# Patient Record
Sex: FEMALE | Race: WHITE | NOT HISPANIC OR LATINO | Employment: OTHER | URBAN - METROPOLITAN AREA
[De-identification: names, ages, dates, MRNs, and addresses within clinical notes are randomized per-mention and may not be internally consistent; named-entity substitution may affect disease eponyms.]

---

## 2020-11-16 ENCOUNTER — APPOINTMENT (OUTPATIENT)
Dept: CT IMAGING | Facility: CLINIC | Age: 70
DRG: 177 | End: 2020-11-16
Attending: EMERGENCY MEDICINE
Payer: MEDICARE

## 2020-11-16 ENCOUNTER — HOSPITAL ENCOUNTER (INPATIENT)
Facility: CLINIC | Age: 70
LOS: 2 days | Discharge: HOME | DRG: 177 | End: 2020-11-18
Attending: EMERGENCY MEDICINE | Admitting: INTERNAL MEDICINE
Payer: MEDICARE

## 2020-11-16 DIAGNOSIS — J12.82 PNEUMONIA DUE TO 2019 NOVEL CORONAVIRUS: ICD-10-CM

## 2020-11-16 DIAGNOSIS — E87.6 HYPOKALEMIA: ICD-10-CM

## 2020-11-16 DIAGNOSIS — U07.1 PNEUMONIA DUE TO 2019 NOVEL CORONAVIRUS: ICD-10-CM

## 2020-11-16 DIAGNOSIS — J96.01 ACUTE RESPIRATORY FAILURE WITH HYPOXIA (H): ICD-10-CM

## 2020-11-16 PROBLEM — G47.30 SLEEP APNEA: Status: ACTIVE | Noted: 2020-11-16

## 2020-11-16 PROBLEM — Z87.891 HISTORY OF TOBACCO ABUSE: Status: ACTIVE | Noted: 2020-02-10

## 2020-11-16 PROBLEM — E66.9 OBESITY: Status: ACTIVE | Noted: 2020-11-16

## 2020-11-16 PROBLEM — I10 HYPERTENSION: Status: ACTIVE | Noted: 2020-11-16

## 2020-11-16 PROBLEM — G25.81 RESTLESS LEG SYNDROME: Status: ACTIVE | Noted: 2019-08-09

## 2020-11-16 LAB
ALBUMIN SERPL-MCNC: 3.5 G/DL (ref 3.4–5)
ALP SERPL-CCNC: 83 U/L (ref 40–150)
ALT SERPL W P-5'-P-CCNC: 75 U/L (ref 0–50)
ANION GAP SERPL CALCULATED.3IONS-SCNC: 9 MMOL/L (ref 3–14)
AST SERPL W P-5'-P-CCNC: 84 U/L (ref 0–45)
BASOPHILS # BLD AUTO: 0 10E9/L (ref 0–0.2)
BASOPHILS NFR BLD AUTO: 0.3 %
BILIRUB SERPL-MCNC: 0.7 MG/DL (ref 0.2–1.3)
BUN SERPL-MCNC: 7 MG/DL (ref 7–30)
CALCIUM SERPL-MCNC: 8.6 MG/DL (ref 8.5–10.1)
CHLORIDE SERPL-SCNC: 99 MMOL/L (ref 94–109)
CO2 SERPL-SCNC: 26 MMOL/L (ref 20–32)
CREAT SERPL-MCNC: 0.58 MG/DL (ref 0.52–1.04)
CREAT SERPL-MCNC: 0.6 MG/DL (ref 0.52–1.04)
CRP SERPL-MCNC: 26 MG/L (ref 0–8)
D DIMER PPP FEU-MCNC: 0.9 UG/ML FEU (ref 0–0.5)
DIFFERENTIAL METHOD BLD: NORMAL
EOSINOPHIL # BLD AUTO: 0 10E9/L (ref 0–0.7)
EOSINOPHIL NFR BLD AUTO: 0.2 %
ERYTHROCYTE [DISTWIDTH] IN BLOOD BY AUTOMATED COUNT: 13.6 % (ref 10–15)
ERYTHROCYTE [SEDIMENTATION RATE] IN BLOOD BY WESTERGREN METHOD: 49 MM/H (ref 0–30)
FERRITIN SERPL-MCNC: 107 NG/ML (ref 8–252)
FLUAV+FLUBV AG SPEC QL: NEGATIVE
FLUAV+FLUBV AG SPEC QL: NEGATIVE
GFR SERPL CREATININE-BSD FRML MDRD: >90 ML/MIN/{1.73_M2}
GFR SERPL CREATININE-BSD FRML MDRD: >90 ML/MIN/{1.73_M2}
GLUCOSE BLDC GLUCOMTR-MCNC: 111 MG/DL (ref 70–99)
GLUCOSE BLDC GLUCOMTR-MCNC: 165 MG/DL (ref 70–99)
GLUCOSE SERPL-MCNC: 143 MG/DL (ref 70–99)
HBA1C MFR BLD: 7 % (ref 0–5.6)
HCT VFR BLD AUTO: 39.8 % (ref 35–47)
HGB BLD-MCNC: 13.2 G/DL (ref 11.7–15.7)
IMM GRANULOCYTES # BLD: 0 10E9/L (ref 0–0.4)
IMM GRANULOCYTES NFR BLD: 0.3 %
INTERPRETATION ECG - MUSE: NORMAL
LABORATORY COMMENT REPORT: ABNORMAL
LACTATE BLD-SCNC: 1.2 MMOL/L (ref 0.7–2)
LDH SERPL L TO P-CCNC: 352 U/L (ref 81–234)
LIPASE SERPL-CCNC: 99 U/L (ref 73–393)
LYMPHOCYTES # BLD AUTO: 1.2 10E9/L (ref 0.8–5.3)
LYMPHOCYTES NFR BLD AUTO: 20.9 %
MAGNESIUM SERPL-MCNC: 1.7 MG/DL (ref 1.6–2.3)
MCH RBC QN AUTO: 29.9 PG (ref 26.5–33)
MCHC RBC AUTO-ENTMCNC: 33.2 G/DL (ref 31.5–36.5)
MCV RBC AUTO: 90 FL (ref 78–100)
MONOCYTES # BLD AUTO: 0.6 10E9/L (ref 0–1.3)
MONOCYTES NFR BLD AUTO: 9.8 %
NEUTROPHILS # BLD AUTO: 4 10E9/L (ref 1.6–8.3)
NEUTROPHILS NFR BLD AUTO: 68.5 %
NRBC # BLD AUTO: 0 10*3/UL
NRBC BLD AUTO-RTO: 0 /100
PLATELET # BLD AUTO: 213 10E9/L (ref 150–450)
POTASSIUM SERPL-SCNC: 2.9 MMOL/L (ref 3.4–5.3)
POTASSIUM SERPL-SCNC: 3.2 MMOL/L (ref 3.4–5.3)
PROT SERPL-MCNC: 7.9 G/DL (ref 6.8–8.8)
RBC # BLD AUTO: 4.42 10E12/L (ref 3.8–5.2)
SARS-COV-2 RNA SPEC QL NAA+PROBE: NORMAL
SARS-COV-2 RNA SPEC QL NAA+PROBE: POSITIVE
SODIUM SERPL-SCNC: 134 MMOL/L (ref 133–144)
SPECIMEN SOURCE: ABNORMAL
SPECIMEN SOURCE: NORMAL
SPECIMEN SOURCE: NORMAL
TROPONIN I SERPL-MCNC: <0.015 UG/L (ref 0–0.04)
WBC # BLD AUTO: 5.9 10E9/L (ref 4–11)

## 2020-11-16 PROCEDURE — 93005 ELECTROCARDIOGRAM TRACING: CPT

## 2020-11-16 PROCEDURE — 85652 RBC SED RATE AUTOMATED: CPT | Performed by: EMERGENCY MEDICINE

## 2020-11-16 PROCEDURE — 258N000003 HC RX IP 258 OP 636: Performed by: INTERNAL MEDICINE

## 2020-11-16 PROCEDURE — 83690 ASSAY OF LIPASE: CPT | Performed by: EMERGENCY MEDICINE

## 2020-11-16 PROCEDURE — 96365 THER/PROPH/DIAG IV INF INIT: CPT | Mod: 59

## 2020-11-16 PROCEDURE — 86140 C-REACTIVE PROTEIN: CPT | Performed by: EMERGENCY MEDICINE

## 2020-11-16 PROCEDURE — 250N000012 HC RX MED GY IP 250 OP 636 PS 637: Performed by: INTERNAL MEDICINE

## 2020-11-16 PROCEDURE — 250N000011 HC RX IP 250 OP 636: Performed by: EMERGENCY MEDICINE

## 2020-11-16 PROCEDURE — 83605 ASSAY OF LACTIC ACID: CPT | Performed by: EMERGENCY MEDICINE

## 2020-11-16 PROCEDURE — XW033E5 INTRODUCTION OF REMDESIVIR ANTI-INFECTIVE INTO PERIPHERAL VEIN, PERCUTANEOUS APPROACH, NEW TECHNOLOGY GROUP 5: ICD-10-PCS | Performed by: INTERNAL MEDICINE

## 2020-11-16 PROCEDURE — 250N000011 HC RX IP 250 OP 636: Performed by: INTERNAL MEDICINE

## 2020-11-16 PROCEDURE — 83735 ASSAY OF MAGNESIUM: CPT | Performed by: INTERNAL MEDICINE

## 2020-11-16 PROCEDURE — 83036 HEMOGLOBIN GLYCOSYLATED A1C: CPT | Performed by: INTERNAL MEDICINE

## 2020-11-16 PROCEDURE — 258N000003 HC RX IP 258 OP 636: Performed by: EMERGENCY MEDICINE

## 2020-11-16 PROCEDURE — 36415 COLL VENOUS BLD VENIPUNCTURE: CPT | Performed by: INTERNAL MEDICINE

## 2020-11-16 PROCEDURE — 120N000001 HC R&B MED SURG/OB

## 2020-11-16 PROCEDURE — C9803 HOPD COVID-19 SPEC COLLECT: HCPCS

## 2020-11-16 PROCEDURE — 84132 ASSAY OF SERUM POTASSIUM: CPT | Performed by: INTERNAL MEDICINE

## 2020-11-16 PROCEDURE — 80053 COMPREHEN METABOLIC PANEL: CPT | Performed by: EMERGENCY MEDICINE

## 2020-11-16 PROCEDURE — 250N000009 HC RX 250: Performed by: INTERNAL MEDICINE

## 2020-11-16 PROCEDURE — 99285 EMERGENCY DEPT VISIT HI MDM: CPT | Mod: 25

## 2020-11-16 PROCEDURE — 250N000009 HC RX 250: Performed by: EMERGENCY MEDICINE

## 2020-11-16 PROCEDURE — 83520 IMMUNOASSAY QUANT NOS NONAB: CPT | Performed by: INTERNAL MEDICINE

## 2020-11-16 PROCEDURE — 85025 COMPLETE CBC W/AUTO DIFF WBC: CPT | Performed by: EMERGENCY MEDICINE

## 2020-11-16 PROCEDURE — 250N000013 HC RX MED GY IP 250 OP 250 PS 637: Performed by: INTERNAL MEDICINE

## 2020-11-16 PROCEDURE — 99223 1ST HOSP IP/OBS HIGH 75: CPT | Mod: AI | Performed by: INTERNAL MEDICINE

## 2020-11-16 PROCEDURE — 84484 ASSAY OF TROPONIN QUANT: CPT | Performed by: EMERGENCY MEDICINE

## 2020-11-16 PROCEDURE — 83615 LACTATE (LD) (LDH) ENZYME: CPT | Performed by: EMERGENCY MEDICINE

## 2020-11-16 PROCEDURE — 82565 ASSAY OF CREATININE: CPT | Performed by: INTERNAL MEDICINE

## 2020-11-16 PROCEDURE — 999N001017 HC STATISTIC GLUCOSE BY METER IP

## 2020-11-16 PROCEDURE — 85379 FIBRIN DEGRADATION QUANT: CPT | Performed by: EMERGENCY MEDICINE

## 2020-11-16 PROCEDURE — 71275 CT ANGIOGRAPHY CHEST: CPT

## 2020-11-16 PROCEDURE — 82728 ASSAY OF FERRITIN: CPT | Performed by: INTERNAL MEDICINE

## 2020-11-16 PROCEDURE — U0003 INFECTIOUS AGENT DETECTION BY NUCLEIC ACID (DNA OR RNA); SEVERE ACUTE RESPIRATORY SYNDROME CORONAVIRUS 2 (SARS-COV-2) (CORONAVIRUS DISEASE [COVID-19]), AMPLIFIED PROBE TECHNIQUE, MAKING USE OF HIGH THROUGHPUT TECHNOLOGIES AS DESCRIBED BY CMS-2020-01-R: HCPCS | Performed by: EMERGENCY MEDICINE

## 2020-11-16 PROCEDURE — 96361 HYDRATE IV INFUSION ADD-ON: CPT

## 2020-11-16 PROCEDURE — 87804 INFLUENZA ASSAY W/OPTIC: CPT | Performed by: INTERNAL MEDICINE

## 2020-11-16 RX ORDER — PROCHLORPERAZINE MALEATE 5 MG
5 TABLET ORAL EVERY 6 HOURS PRN
Status: DISCONTINUED | OUTPATIENT
Start: 2020-11-16 | End: 2020-11-18 | Stop reason: HOSPADM

## 2020-11-16 RX ORDER — POTASSIUM CHLORIDE 7.45 MG/ML
10 INJECTION INTRAVENOUS ONCE
Status: COMPLETED | OUTPATIENT
Start: 2020-11-16 | End: 2020-11-16

## 2020-11-16 RX ORDER — FAMOTIDINE 20 MG
2000 TABLET ORAL EVERY MORNING
COMMUNITY
End: 2022-10-04

## 2020-11-16 RX ORDER — ALLOPURINOL 100 MG/1
100 TABLET ORAL EVERY EVENING
Status: ON HOLD | COMMUNITY
End: 2024-06-20

## 2020-11-16 RX ORDER — ROPINIROLE 0.25 MG/1
0.25 TABLET, FILM COATED ORAL 3 TIMES DAILY PRN
Status: DISCONTINUED | OUTPATIENT
Start: 2020-11-16 | End: 2020-11-18 | Stop reason: HOSPADM

## 2020-11-16 RX ORDER — LIDOCAINE 40 MG/G
CREAM TOPICAL
Status: DISCONTINUED | OUTPATIENT
Start: 2020-11-16 | End: 2020-11-18 | Stop reason: HOSPADM

## 2020-11-16 RX ORDER — MULTIVITAMIN,THERAPEUTIC
1 TABLET ORAL
Status: DISCONTINUED | OUTPATIENT
Start: 2020-11-17 | End: 2020-11-18 | Stop reason: HOSPADM

## 2020-11-16 RX ORDER — LISINOPRIL 20 MG/1
20 TABLET ORAL DAILY
Status: DISCONTINUED | OUTPATIENT
Start: 2020-11-17 | End: 2020-11-18 | Stop reason: HOSPADM

## 2020-11-16 RX ORDER — AMOXICILLIN 250 MG
2 CAPSULE ORAL 2 TIMES DAILY
Status: DISCONTINUED | OUTPATIENT
Start: 2020-11-16 | End: 2020-11-18 | Stop reason: HOSPADM

## 2020-11-16 RX ORDER — FAMOTIDINE 10 MG
10 TABLET ORAL 2 TIMES DAILY
Status: DISCONTINUED | OUTPATIENT
Start: 2020-11-16 | End: 2020-11-18 | Stop reason: HOSPADM

## 2020-11-16 RX ORDER — POLYETHYLENE GLYCOL 3350 17 G/17G
17 POWDER, FOR SOLUTION ORAL DAILY PRN
Status: DISCONTINUED | OUTPATIENT
Start: 2020-11-16 | End: 2020-11-18 | Stop reason: HOSPADM

## 2020-11-16 RX ORDER — ATORVASTATIN CALCIUM 20 MG/1
20 TABLET, FILM COATED ORAL EVERY EVENING
Status: DISCONTINUED | OUTPATIENT
Start: 2020-11-16 | End: 2020-11-18 | Stop reason: HOSPADM

## 2020-11-16 RX ORDER — ATORVASTATIN CALCIUM 20 MG/1
20 TABLET, FILM COATED ORAL EVERY EVENING
Status: ON HOLD | COMMUNITY
End: 2024-06-20

## 2020-11-16 RX ORDER — POLYETHYLENE GLYCOL 3350 17 G/17G
17 POWDER, FOR SOLUTION ORAL DAILY
Status: DISCONTINUED | OUTPATIENT
Start: 2020-11-16 | End: 2020-11-18 | Stop reason: HOSPADM

## 2020-11-16 RX ORDER — SODIUM CHLORIDE 9 MG/ML
INJECTION, SOLUTION INTRAVENOUS CONTINUOUS
Status: DISCONTINUED | OUTPATIENT
Start: 2020-11-16 | End: 2020-11-17

## 2020-11-16 RX ORDER — PROCHLORPERAZINE 25 MG
12.5 SUPPOSITORY, RECTAL RECTAL EVERY 12 HOURS PRN
Status: DISCONTINUED | OUTPATIENT
Start: 2020-11-16 | End: 2020-11-18 | Stop reason: HOSPADM

## 2020-11-16 RX ORDER — NALOXONE HYDROCHLORIDE 0.4 MG/ML
.1-.4 INJECTION, SOLUTION INTRAMUSCULAR; INTRAVENOUS; SUBCUTANEOUS
Status: DISCONTINUED | OUTPATIENT
Start: 2020-11-16 | End: 2020-11-18 | Stop reason: HOSPADM

## 2020-11-16 RX ORDER — AMOXICILLIN 250 MG
1 CAPSULE ORAL 2 TIMES DAILY
Status: DISCONTINUED | OUTPATIENT
Start: 2020-11-16 | End: 2020-11-18 | Stop reason: HOSPADM

## 2020-11-16 RX ORDER — LISINOPRIL 20 MG/1
20 TABLET ORAL EVERY MORNING
Status: ON HOLD | COMMUNITY
End: 2024-06-20

## 2020-11-16 RX ORDER — MULTIVITAMIN,THERAPEUTIC
1 TABLET ORAL EVERY EVENING
COMMUNITY
End: 2022-10-04

## 2020-11-16 RX ORDER — DEXTROSE MONOHYDRATE 25 G/50ML
25-50 INJECTION, SOLUTION INTRAVENOUS
Status: DISCONTINUED | OUTPATIENT
Start: 2020-11-16 | End: 2020-11-16

## 2020-11-16 RX ORDER — ROPINIROLE 0.25 MG/1
0.25 TABLET, FILM COATED ORAL 2 TIMES DAILY
Status: ON HOLD | COMMUNITY
End: 2024-06-20

## 2020-11-16 RX ORDER — PROPRANOLOL HYDROCHLORIDE 20 MG/1
60 TABLET ORAL 2 TIMES DAILY
Status: DISCONTINUED | OUTPATIENT
Start: 2020-11-16 | End: 2020-11-18 | Stop reason: HOSPADM

## 2020-11-16 RX ORDER — LANOLIN ALCOHOL/MO/W.PET/CERES
1000 CREAM (GRAM) TOPICAL EVERY EVENING
COMMUNITY
End: 2022-10-04

## 2020-11-16 RX ORDER — NICOTINE POLACRILEX 4 MG
15-30 LOZENGE BUCCAL
Status: DISCONTINUED | OUTPATIENT
Start: 2020-11-16 | End: 2020-11-16

## 2020-11-16 RX ORDER — POTASSIUM CHLORIDE 1.5 G/1.58G
40 POWDER, FOR SOLUTION ORAL ONCE
Status: COMPLETED | OUTPATIENT
Start: 2020-11-16 | End: 2020-11-16

## 2020-11-16 RX ORDER — SODIUM CHLORIDE AND POTASSIUM CHLORIDE 150; 900 MG/100ML; MG/100ML
INJECTION, SOLUTION INTRAVENOUS CONTINUOUS
Status: ACTIVE | OUTPATIENT
Start: 2020-11-16 | End: 2020-11-17

## 2020-11-16 RX ORDER — GLIPIZIDE 5 MG/1
5 TABLET, FILM COATED, EXTENDED RELEASE ORAL EVERY EVENING
Status: ON HOLD | COMMUNITY
End: 2024-06-20

## 2020-11-16 RX ORDER — PROPRANOLOL HYDROCHLORIDE 60 MG/1
60 TABLET ORAL 2 TIMES DAILY
Status: ON HOLD | COMMUNITY
End: 2024-06-20

## 2020-11-16 RX ORDER — LEVOTHYROXINE SODIUM 125 UG/1
125 TABLET ORAL EVERY MORNING
Status: ON HOLD | COMMUNITY
End: 2024-06-20

## 2020-11-16 RX ORDER — ACETAMINOPHEN 325 MG/1
650 TABLET ORAL EVERY 4 HOURS PRN
Status: DISCONTINUED | OUTPATIENT
Start: 2020-11-16 | End: 2020-11-18 | Stop reason: HOSPADM

## 2020-11-16 RX ORDER — NICOTINE POLACRILEX 4 MG
15-30 LOZENGE BUCCAL
Status: DISCONTINUED | OUTPATIENT
Start: 2020-11-16 | End: 2020-11-18 | Stop reason: HOSPADM

## 2020-11-16 RX ORDER — AMLODIPINE BESYLATE 10 MG/1
10 TABLET ORAL EVERY EVENING
Status: DISCONTINUED | OUTPATIENT
Start: 2020-11-16 | End: 2020-11-18 | Stop reason: HOSPADM

## 2020-11-16 RX ORDER — FAMOTIDINE 20 MG
1000 TABLET ORAL EVERY EVENING
COMMUNITY
End: 2022-10-04

## 2020-11-16 RX ORDER — OXYCODONE HYDROCHLORIDE 5 MG/1
5-10 TABLET ORAL
Status: DISCONTINUED | OUTPATIENT
Start: 2020-11-16 | End: 2020-11-18 | Stop reason: HOSPADM

## 2020-11-16 RX ORDER — LEVOTHYROXINE SODIUM 150 UG/1
150 TABLET ORAL DAILY
Status: DISCONTINUED | OUTPATIENT
Start: 2020-11-17 | End: 2020-11-18 | Stop reason: HOSPADM

## 2020-11-16 RX ORDER — IOPAMIDOL 755 MG/ML
78 INJECTION, SOLUTION INTRAVASCULAR ONCE
Status: COMPLETED | OUTPATIENT
Start: 2020-11-16 | End: 2020-11-16

## 2020-11-16 RX ORDER — ONDANSETRON 2 MG/ML
4 INJECTION INTRAMUSCULAR; INTRAVENOUS EVERY 6 HOURS PRN
Status: DISCONTINUED | OUTPATIENT
Start: 2020-11-16 | End: 2020-11-18 | Stop reason: HOSPADM

## 2020-11-16 RX ORDER — DEXTROSE MONOHYDRATE 25 G/50ML
25-50 INJECTION, SOLUTION INTRAVENOUS
Status: DISCONTINUED | OUTPATIENT
Start: 2020-11-16 | End: 2020-11-18 | Stop reason: HOSPADM

## 2020-11-16 RX ORDER — AMLODIPINE BESYLATE 10 MG/1
10 TABLET ORAL EVERY EVENING
Status: ON HOLD | COMMUNITY
End: 2024-06-20

## 2020-11-16 RX ORDER — ONDANSETRON 4 MG/1
4 TABLET, ORALLY DISINTEGRATING ORAL EVERY 6 HOURS PRN
Status: DISCONTINUED | OUTPATIENT
Start: 2020-11-16 | End: 2020-11-18 | Stop reason: HOSPADM

## 2020-11-16 RX ORDER — ALLOPURINOL 100 MG/1
100 TABLET ORAL EVERY EVENING
Status: DISCONTINUED | OUTPATIENT
Start: 2020-11-16 | End: 2020-11-18 | Stop reason: HOSPADM

## 2020-11-16 RX ADMIN — IPRATROPIUM BROMIDE AND ALBUTEROL 2 PUFF: 20; 100 SPRAY, METERED RESPIRATORY (INHALATION) at 22:06

## 2020-11-16 RX ADMIN — POTASSIUM CHLORIDE AND SODIUM CHLORIDE: 900; 150 INJECTION, SOLUTION INTRAVENOUS at 15:28

## 2020-11-16 RX ADMIN — SODIUM CHLORIDE 1000 ML: 9 INJECTION, SOLUTION INTRAVENOUS at 10:58

## 2020-11-16 RX ADMIN — FAMOTIDINE 10 MG: 10 TABLET, FILM COATED ORAL at 22:06

## 2020-11-16 RX ADMIN — REMDESIVIR 200 MG: 100 INJECTION, POWDER, LYOPHILIZED, FOR SOLUTION INTRAVENOUS at 17:54

## 2020-11-16 RX ADMIN — PROPRANOLOL HYDROCHLORIDE 60 MG: 20 TABLET ORAL at 22:08

## 2020-11-16 RX ADMIN — SODIUM CHLORIDE 100 ML: 9 INJECTION, SOLUTION INTRAVENOUS at 12:37

## 2020-11-16 RX ADMIN — POTASSIUM CHLORIDE 10 MEQ: 7.46 INJECTION, SOLUTION INTRAVENOUS at 13:25

## 2020-11-16 RX ADMIN — DEXAMETHASONE 6 MG: 2 TABLET ORAL at 16:20

## 2020-11-16 RX ADMIN — POTASSIUM CHLORIDE 40 MEQ: 1.5 POWDER, FOR SOLUTION ORAL at 17:55

## 2020-11-16 RX ADMIN — Medication 100 MG: at 19:27

## 2020-11-16 RX ADMIN — AMLODIPINE BESYLATE 10 MG: 10 TABLET ORAL at 19:27

## 2020-11-16 RX ADMIN — SODIUM CHLORIDE 1000 ML: 9 INJECTION, SOLUTION INTRAVENOUS at 13:25

## 2020-11-16 RX ADMIN — ENOXAPARIN SODIUM 40 MG: 40 INJECTION SUBCUTANEOUS at 22:06

## 2020-11-16 RX ADMIN — IOPAMIDOL 78 ML: 755 INJECTION, SOLUTION INTRAVENOUS at 12:36

## 2020-11-16 RX ADMIN — ALLOPURINOL 100 MG: 100 TABLET ORAL at 19:27

## 2020-11-16 RX ADMIN — ATORVASTATIN CALCIUM 20 MG: 20 TABLET, FILM COATED ORAL at 19:27

## 2020-11-16 RX ADMIN — IPRATROPIUM BROMIDE AND ALBUTEROL 2 PUFF: 20; 100 SPRAY, METERED RESPIRATORY (INHALATION) at 17:56

## 2020-11-16 ASSESSMENT — ENCOUNTER SYMPTOMS
APPETITE CHANGE: 1
FLANK PAIN: 1
DYSURIA: 0
DIZZINESS: 0
LIGHT-HEADEDNESS: 1
WEAKNESS: 0
COUGH: 1
DIAPHORESIS: 0
VOMITING: 0
SHORTNESS OF BREATH: 1
FREQUENCY: 0
FEVER: 1
ABDOMINAL PAIN: 1
NAUSEA: 1
NUMBNESS: 0
CHILLS: 1
FATIGUE: 1
HEADACHES: 1
ACTIVITY CHANGE: 1
HEMATURIA: 0

## 2020-11-16 ASSESSMENT — ACTIVITIES OF DAILY LIVING (ADL)
ADLS_ACUITY_SCORE: 17
ADLS_ACUITY_SCORE: 17

## 2020-11-16 ASSESSMENT — MIFFLIN-ST. JEOR: SCORE: 1476.63

## 2020-11-16 NOTE — H&P
Admitted:     11/16/2020      HISTORY OF PRESENT ILLNESS:  This is a 70-year-old female with history of diabetes mellitus type 2, hypertension, hyperlipidemia, restless legs syndrome, gout, fatty liver, hypothyroidism, sleep apnea not compliant with CPAP, obesity, comes to the ER with complaint of shortness of breath, subjective fever, cough, sweating, nausea, vomiting, diarrhea for 1 week.      According to the patient, she started having these symptoms last week on Monday and has been started getting the same symptoms from Wednesday last week.  She does not check her temperature, but has a feeling that she may have low-grade temp.  She is sweating.  She is coughing, dry cough mostly, and short of breath.  She started having nausea and vomiting and diarrhea, some abdominal discomfort as well.   was getting more sicker and weaker, unable to get up from the bathroom, so she called EMS today and both her and her  were brought to the ER.  She denies having any chest pain, orthopnea, PND, palpitation.  No headache, dizziness, lightheadedness.  No dysuria, hematuria, or constipation at this time.  She denies any hemoptysis, hematemesis, melena, or hematochezia.      The patient has been evaluated in the ER.  CT scan of the chest PE protocol was done.  The CT scan of the chest is negative for acute pulmonary embolism, but does show patchy peripheral interstitial linear airspace infiltrates which are nonspecific, but differential includes COVID pneumonia.  The Hospitalist Service is consulted to admit the patient.  The patient was also found to be hypoxic, requiring 2 liters of oxygen at this time.      ASSESSMENT AND PLAN:   1.  Acute hypoxic respiratory failure/patient under investigation, rule out COVID pneumonia:  This is a 70-year-old female with history of diabetes, hypertension, ex-smoker, hypothyroid, obese with symptoms consistent with current pandemic, short of breath, cough, nausea, vomiting,  diarrhea, abdominal discomfort, decreased appetite.  Her CT scan does show patchy peripheral interstitial airspace infiltrate suspicious for COVID pneumonia.  At this time, we will treat as patient under investigation, but I think high chances that her COVID will come back positive.  I will send ESR, CRP, ferritin, D-dimer at this time.  Give her 1 dose of dexamethasone 6 mg as she is requiring oxygen 2 liters to keep saturation above 94% at this time.  She was 88% on room air.  If her COVID does come back positive, she needs to be on remdesivir and dexamethasone.  I will also send influenza A and B and RSV swab to rule out influenza.   2.  Hypokalemia:  Potassium 2.9.  We will keep her on potassium replacement protocol.  She does receive some potassium supplement in the ER.   3.  Diabetes mellitus type 2.  She is on glipizide and metformin.  We will hold the oral hypoglycemic and keep her on sliding scale insulin for correction on hypoglycemia protocol.  If blood sugar goes high, we can add some long-acting Lantus as well.   4.  Hypertension.  She is on lisinopril and propranolol.  I will continue with that.   5.  History of restless legs syndrome.  She uses Requip as needed.  We will continue that while she is in the hospital.   6.  Hypothyroidism, on levothyroxine.  We will continue with that.     7.  Fatty liver with elevated liver enzymes.  The patient's liver enzymes are slightly elevated.  She has a history of fatty liver and mildly elevated liver enzymes as well.  We will keep an eye on that.  If she has to be started on remdesivir, then we will need to check her liver panel every 24-48 hours.   7.  GI prophylaxis with Pepcid.   8.  DVT prophylaxis with Lovenox.      CODE STATUS:  I had a detailed discussion with the patient regarding her code status and she wants to be DNR/DNI.      The case was discussed with ER physician and the nursing staff taking care of the patient.         CRESENCIO DHILLON MD              D: 2020   T: 2020   MT: JORGE      Name:     CARLENE BUCKLEY   MRN:      [id number]        Account:      [id number]   :      1950        Admitted:     2020                   Document: [id number]       cc: Briseyda Fang NP

## 2020-11-16 NOTE — PHARMACY-ADMISSION MEDICATION HISTORY
Pharmacy Medication History  Admission medication history interview status for the 11/16/2020  admission is complete. See EPIC admission navigator for prior to admission medications       Medication history sources: Patient, Pharmacy (No [employer]) and Patient's home med list  Location of interview: Phone  Medication history source reliability: Good  Adherence assessment: Good    Significant changes made to the medication list:  - Added all medications.    Additional medication history information:   - Called [facility] pharmacy to confirm doses of amlodipine and levothyroxine as well as dose forms of propranolol and glipizide.  - Patient hasn't taken her medications in 3-4 days d/t nausea and vomiting.     Medication reconciliation completed by provider prior to medication history? No    Time spent in this activity: 20 minutes      Prior to Admission medications    Medication Sig Last Dose Taking? Auth Provider   allopurinol (ZYLOPRIM) 100 MG tablet Take 100 mg by mouth every evening Past Week at Unknown time Yes Unknown, Entered By History   amLODIPine (NORVASC) 10 MG tablet Take 10 mg by mouth every evening Past Week at Unknown time Yes Unknown, Entered By History   atorvastatin (LIPITOR) 20 MG tablet Take 20 mg by mouth every evening Past Week at Unknown time Yes Unknown, Entered By History   cyanocobalamin (VITAMIN B-12) 1000 MCG tablet Take 1,000 mcg by mouth every evening Past Week at Unknown time Yes Unknown, Entered By History   glipiZIDE (GLUCOTROL XL) 5 MG 24 hr tablet Take 5 mg by mouth daily Past Week at Unknown time Yes Unknown, Entered By History   GLUCOSAMINE CHONDROITIN COMPLX PO Take 1 tablet by mouth 2 times daily Past Week at Unknown time Yes Unknown, Entered By History   levothyroxine (SYNTHROID/LEVOTHROID) 150 MCG tablet Take 150 mcg by mouth daily Past Week at Unknown time Yes Unknown, Entered By History   lisinopril (ZESTRIL) 20 MG tablet Take 20 mg by mouth daily Past Week at Unknown time  Yes Unknown, Entered By History   metFORMIN (GLUCOPHAGE) 500 MG tablet Take 1,000 mg by mouth daily (with breakfast) Past Week at Unknown time Yes Unknown, Entered By History   metFORMIN (GLUCOPHAGE) 500 MG tablet Take 1,500 mg by mouth daily (with dinner) Past Week at Unknown time Yes Unknown, Entered By History   MILK THISTLE PO Take 1 tablet by mouth 2 times daily Past Week at Unknown time Yes Unknown, Entered By History   multivitamin, therapeutic (THERA-VIT) TABS tablet Take 1 tablet by mouth every evening Past Week at Unknown time Yes Unknown, Entered By History   propranolol (INDERAL) 60 MG tablet Take 60 mg by mouth 2 times daily Past Week at Unknown time Yes Unknown, Entered By History   pyridOXINE (VITAMIN B6) 100 MG TABS Take 100 mg by mouth every evening Past Week at Unknown time Yes Unknown, Entered By History   rOPINIRole (REQUIP) 0.25 MG tablet Take 0.25 mg by mouth 3 times daily as needed prn Yes Unknown, Entered By History   Vitamin D, Cholecalciferol, 25 MCG (1000 UT) CAPS Take 2,000 Units by mouth every morning Past Week at Unknown time Yes Unknown, Entered By History   Vitamin D, Cholecalciferol, 25 MCG (1000 UT) CAPS Take 1,000 Units by mouth every evening Past Week at Unknown time Yes Unknown, Entered By History

## 2020-11-16 NOTE — PROVIDER NOTIFICATION
Admitting MD Lion paged to update on Flu swab collected and alternative test to run per lab d/t lack of testing supplies.     Lab inquired about sending out swab for RSV testing, but pt has tested Covid positive and will not need extra testing at this time.     Potassium 3.2 and will be replaced per standing orders.

## 2020-11-16 NOTE — ED NOTES
Bed: ED01  Expected date:   Expected time:   Means of arrival:   Comments:  Princess 514 70F cough, chills, weak - ETA 6min

## 2020-11-16 NOTE — PROGRESS NOTES
COVID-19 come back positive. Start her on IV Remdesivir and Oral Dexamethasone.   Influenza negative.

## 2020-11-16 NOTE — ED TRIAGE NOTES
Pt presents from home with complaints of being ill for one week. Pt reports she has had SOB, cough, chills, abdominal pain, n/v/d. Pt also reports  at home ill as well. Pt reports poor PO intake over the past week. Pt o2 saturation 88% on arrival

## 2020-11-16 NOTE — PLAN OF CARE
RECEIVING UNIT ED HANDOFF REVIEW    ED Nurse Handoff Report was reviewed by: Maribeth Araujo RN on November 16, 2020 at 2:39 PM       Spoke with ED RN over phone for report.

## 2020-11-16 NOTE — LETTER
Transition Communication Hand-off for Care Transitions to Next Level of Care Provider    Name: Kyrie Wilkes  : 1950  MRN #: [id number]  Primary Care Provider: Princess Castro     Primary Clinic: [address] [facility] [address]     Reason for Hospitalization:  Hypokalemia [E87.6]  Acute respiratory failure with hypoxia (H) [J96.01]  Pneumonia due to 2019 novel coronavirus [U07.1, J12.89]  Admit Date/Time: 2020 10:47 AM  Discharge Date: 20  Payor Source: Payor: MEDICARE / Plan: MEDICARE / Product Type: Medicare /     High readmission risk                Discharge Plan:  Discharge Plan:      Most Recent Value   Concerns Comments  Pt's spouse is currently hospitalized too with Covid.           Concern for non-adherence with plan of care: No  Discharge Needs Assessment:  Needs      Most Recent Value   Equipment Currently Used at Home  none   # of Referrals Placed by CTS  Scheduled Follow-up appointments          Already enrolled in Tele-monitoring program and name of program: No  Follow-up specialty is recommended: No        Any outstanding tests or procedures:  NA      Referrals     Future Labs/Procedures    COVID-19 GetWell Loop Referral     Comments:    We know it can be scary to hear that you might have COVID-19. So our team can help track your symptoms and make sure you are doing ok over the next two weeks, we use a program called [employer] to keep in touch. When you receive an email from [employer], please consider enrolling in our monitoring program. There is no cost to you for monitoring.     Here is a URL where you can learn more:  [email or web address]            Key Recommendations:  Get Well Loop for continued Covid symptom monitoring  Follow-up plan:    A virtual follow-up appointment has been scheduled on 20 with pt's PCP Briseyda Santiago [facility]    Thank-you,    Mayra Hernandez, RN  Inpatient Care Management  [facility]  [place]  [phone]    AVS/Discharge Summary is the source of truth; this is a helpful guide for improved communication of patient story

## 2020-11-16 NOTE — H&P
[facility]    History and Physical  Hospitalist       Date of Admission:  11/16/2020    Assessment & Plan       This is a 70-year-old female with history of diabetes mellitus type 2, hypertension, hyperlipidemia, restless legs syndrome, gout, fatty liver, hypothyroidism, sleep apnea not compliant with CPAP, obesity, comes to the ER with complaint of shortness of breath, subjective fever, cough, sweating, nausea, vomiting, diarrhea for 1 week.      ASSESSMENT AND PLAN:   1.  Acute hypoxic respiratory failure/patient under investigation, rule out COVID pneumonia:  This is a 70-year-old female with history of diabetes, hypertension, ex-smoker, hypothyroid, obese with symptoms consistent with current pandemic, short of breath, cough, nausea, vomiting, diarrhea, abdominal discomfort, decreased appetite.  Her CT scan does show patchy peripheral interstitial airspace infiltrate suspicious for COVID pneumonia.  At this time, we will treat as patient under investigation, but I think high chances that her COVID will come back positive.  I will send ESR, CRP, ferritin, D-dimer at this time.  Give her 1 dose of dexamethasone 6 mg as she is requiring oxygen 2 liters to keep saturation above 94% at this time.  She was 88% on room air.  If her COVID does come back positive, she needs to be on remdesivir and dexamethasone.  I will also send influenza A and B and RSV swab to rule out influenza.   2.  Hypokalemia:  Potassium 2.9.  We will keep her on potassium replacement protocol.  She does receive some potassium supplement in the ER.   3.  Diabetes mellitus type 2.  She is on glipizide and metformin.  We will hold the oral hypoglycemic and keep her on sliding scale insulin for correction on hypoglycemia protocol.  If blood sugar goes high, we can add some long-acting Lantus as well.   4.  Hypertension.  She is on lisinopril and propranolol.  I will continue with that.   5.  History of restless legs  syndrome.  She uses Requip as needed.  We will continue that while she is in the hospital.   6.  Hypothyroidism, on levothyroxine.  We will continue with that.     7.  Fatty liver with elevated liver enzymes.  The patient's liver enzymes are slightly elevated.  She has a history of fatty liver and mildly elevated liver enzymes as well.  We will keep an eye on that.  If she has to be started on remdesivir, then we will need to check her liver panel every 24-48 hours.   7.  GI prophylaxis with Pepcid.   8.  DVT prophylaxis with Lovenox.      CODE STATUS:  I had a detailed discussion with the patient regarding her code status and she wants to be DNR/DNI.      The case was discussed with ER physician and the nursing staff taking care of the patient.         JOHN LION MD         DVT Prophylaxis: Enoxaparin (Lovenox) SQ  Code Status: DNR / DNI    Disposition: Expected discharge in w days once stable    John Lion MD    Primary Care Physician   Briseyda Fang    Chief Complaint   SOB, Cough, nausea, vomiting, diarrhea     History is obtained from the patient    History of Present Illness   Admitted:     11/16/2020      HISTORY OF PRESENT ILLNESS:  This is a 70-year-old female with history of diabetes mellitus type 2, hypertension, hyperlipidemia, restless legs syndrome, gout, fatty liver, hypothyroidism, sleep apnea not compliant with CPAP, obesity, comes to the ER with complaint of shortness of breath, subjective fever, cough, sweating, nausea, vomiting, diarrhea for 1 week.      According to the patient, she started having these symptoms last week on Monday and has been started getting the same symptoms from Wednesday last week.  She does not check her temperature, but has a feeling that she may have low-grade temp.  She is sweating.  She is coughing, dry cough mostly, and short of breath.  She started having nausea and vomiting and diarrhea, some abdominal discomfort as well.   was getting more sicker and  weaker, unable to get up from the bathroom, so she called EMS today and both her and her  were brought to the ER.  She denies having any chest pain, orthopnea, PND, palpitation.  No headache, dizziness, lightheadedness.  No dysuria, hematuria, or constipation at this time.  She denies any hemoptysis, hematemesis, melena, or hematochezia.      The patient has been evaluated in the ER.  CT scan of the chest PE protocol was done.  The CT scan of the chest is negative for acute pulmonary embolism, but does show patchy peripheral interstitial linear airspace infiltrates which are nonspecific, but differential includes COVID pneumonia.  The Hospitalist Service is consulted to admit the patient.  The patient was also found to be hypoxic, requiring 2 liters of oxygen at this time.     Past Medical History    I have reviewed this patient's medical history and updated it with pertinent information if needed.   Past Medical History:   Diagnosis Date     Diabetes (H)      High cholesterol      Hypertension        Past Surgical History   I have reviewed this patient's surgical history and updated it with pertinent information if needed.  History reviewed. No pertinent surgical history.    Prior to Admission Medications   Prior to Admission Medications   Prescriptions Last Dose Informant Patient Reported? Taking?   GLUCOSAMINE CHONDROITIN COMPLX PO Past Week at Unknown time Self Yes Yes   Sig: Take 1 tablet by mouth 2 times daily   MILK THISTLE PO Past Week at Unknown time Self Yes Yes   Sig: Take 1 tablet by mouth 2 times daily   Vitamin D, Cholecalciferol, 25 MCG (1000 UT) CAPS Past Week at Unknown time Self Yes Yes   Sig: Take 2,000 Units by mouth every morning   Vitamin D, Cholecalciferol, 25 MCG (1000 UT) CAPS Past Week at Unknown time Self Yes Yes   Sig: Take 1,000 Units by mouth every evening   allopurinol (ZYLOPRIM) 100 MG tablet Past Week at Unknown time Self Yes Yes   Sig: Take 100 mg by mouth every evening    amLODIPine (NORVASC) 10 MG tablet Past Week at Unknown time Self Yes Yes   Sig: Take 10 mg by mouth every evening   atorvastatin (LIPITOR) 20 MG tablet Past Week at Unknown time Self Yes Yes   Sig: Take 20 mg by mouth every evening   cyanocobalamin (VITAMIN B-12) 1000 MCG tablet Past Week at Unknown time Self Yes Yes   Sig: Take 1,000 mcg by mouth every evening   glipiZIDE (GLUCOTROL XL) 5 MG 24 hr tablet Past Week at Unknown time Self Yes Yes   Sig: Take 5 mg by mouth daily   levothyroxine (SYNTHROID/LEVOTHROID) 150 MCG tablet Past Week at Unknown time Self Yes Yes   Sig: Take 150 mcg by mouth daily   lisinopril (ZESTRIL) 20 MG tablet Past Week at Unknown time Self Yes Yes   Sig: Take 20 mg by mouth daily   metFORMIN (GLUCOPHAGE) 500 MG tablet Past Week at Unknown time Self Yes Yes   Sig: Take 1,000 mg by mouth daily (with breakfast)   metFORMIN (GLUCOPHAGE) 500 MG tablet Past Week at Unknown time Self Yes Yes   Sig: Take 1,500 mg by mouth daily (with dinner)   multivitamin, therapeutic (THERA-VIT) TABS tablet Past Week at Unknown time Self Yes Yes   Sig: Take 1 tablet by mouth every evening   propranolol (INDERAL) 60 MG tablet Past Week at Unknown time Self Yes Yes   Sig: Take 60 mg by mouth 2 times daily   pyridOXINE (VITAMIN B6) 100 MG TABS Past Week at Unknown time Self Yes Yes   Sig: Take 100 mg by mouth every evening   rOPINIRole (REQUIP) 0.25 MG tablet prn Self Yes Yes   Sig: Take 0.25 mg by mouth 3 times daily as needed      Facility-Administered Medications: None     Allergies   Allergies   Allergen Reactions     Amoxicillin Swelling and Rash     Penicillins Swelling and Rash       Social History   I have reviewed this patient's social history and updated it with pertinent information if needed. Yuemurali XIMENA Wilkes  reports that she has quit smoking. She has never used smokeless tobacco. She reports previous alcohol use. She reports previous drug use.    Family History   I have reviewed this patient's family  history and updated it with pertinent information if needed.   History reviewed. No pertinent family history.    Review of Systems   CONSTITUTIONAL:  positive for  fevers, sweats, fatigue, malaise and anorexia  EYES:  negative  HEENT:  positive for  cough  RESPIRATORY:  positive for  dry cough  CARDIOVASCULAR:  negative  GASTROINTESTINAL:  positive for nausea, vomiting and diarrhea  GENITOURINARY:  negative  INTEGUMENT/BREAST:  negative  HEMATOLOGIC/LYMPHATIC:  negative  ALLERGIC/IMMUNOLOGIC:  negative  ENDOCRINE:  negative  MUSCULOSKELETAL:  negative  NEUROLOGICAL:  negative  BEHAVIOR/PSYCH:  negative    Physical Exam   Temp: 99.2  F (37.3  C)   BP: (!) 156/81 Pulse: 106   Resp: 20 SpO2: 96 % O2 Device: Nasal cannula Oxygen Delivery: 2 LPM  Vital Signs with Ranges  Temp:  [99.2  F (37.3  C)] 99.2  F (37.3  C)  Pulse:  [104-133] 106  Resp:  [12-39] 20  BP: (150-170)/(67-81) 156/81  SpO2:  [88 %-97 %] 96 %  222 lbs 0 oz    Constitutional: Awake, alert, cooperative, no apparent distress.  Eyes: Conjunctiva and pupils examined and normal.  HEENT: Moist mucous membranes, normal dentition.  Respiratory: Clear to auscultation bilaterally, no crackles or wheezing.  Cardiovascular: Regular rate and rhythm, normal S1 and S2, and no murmur noted.  GI: Soft, non-distended, non-tender, normal bowel sounds.  Lymph/Hematologic: No anterior cervical or supraclavicular adenopathy.  Skin: No rashes, no cyanosis, no edema.  Musculoskeletal: No joint swelling, erythema or tenderness.  Neurologic: Cranial nerves 2-12 intact, normal strength and sensation.  Psychiatric: Alert, oriented to person, place and time, no obvious anxiety or depression.    Data   Data reviewed today:  I personally reviewed the EKG tracing showing Sinus Tachycardia, no acute ischemic changes. .  Recent Labs   Lab 11/16/20  1052   WBC 5.9   HGB 13.2   MCV 90         POTASSIUM 2.9*   CHLORIDE 99   CO2 26   BUN 7   CR 0.58   ANIONGAP 9   JEN 8.6   GLC  143*   ALBUMIN 3.5   PROTTOTAL 7.9   BILITOTAL 0.7   ALKPHOS 83   ALT 75*   AST 84*   LIPASE 99   TROPI <0.015       Recent Results (from the past 24 hour(s))   CT Chest Pulmonary Embolism w Contrast    Narrative    CT CHEST PULMONARY EMBOLISM WITH CONTRAST  11/16/2020 12:42 PM    CLINICAL HISTORY: PE suspected, high pretest probability.    TECHNIQUE: CT angiogram chest during arterial phase injection IV  contrast. 2D and 3D MIP reconstructions were performed by the CT  technologist. Dose reduction techniques were used.     CONTRAST: _______ mL Isovue-370    COMPARISON: None.    FINDINGS:  ANGIOGRAM CHEST: Pulmonary arteries are normal caliber and negative  for pulmonary emboli. Thoracic aorta is negative for dissection. No CT  evidence of right heart strain.    LUNGS AND PLEURA: Peripheral airspace and interstitial infiltrates in  a patchy distribution, this is nonspecific. Differential includes  COVID-19 pneumonia.    MEDIASTINUM/AXILLAE: A few mildly prominent lymph nodes are noted,  likely reactive in this setting. No aneurysm. There are moderate  coronary vascular calcifications consistent with coronary artery  disease.    UPPER ABDOMEN: No acute findings.    MUSCULOSKELETAL: No frankly destructive bony lesions.      Impression    IMPRESSION:  1.  No pulmonary embolism demonstrated.  2.  Patchy peripheral interstitial and airspace infiltrates which are  nonspecific. Differential includes COVID-19 pneumonia.    SHREE DAMON MD

## 2020-11-16 NOTE — ED PROVIDER NOTES
History     Chief Complaint:  Shortness of Breath and Abdominal Pain       HPI   Kyrie Wilkes is a 70 year old female who presents with 1 week of shortness of breath, cough, chills and fatigue.  Patient states that she has been feeling unwell since Monday and her  has been sick with similar symptoms on Wednesday.  Over the weekend they have not been eating as much as she states that they are nauseated but also have lost her sense of taste and smell.  She also has a dry nonproductive cough.  She has had intermittent subjective fevers but no elevated temperatures that have been recorded.  She also complains of mild abdominal discomfort but denies any vomiting.  No diarrhea.  No hematochezia.  Patient has not any urinary symptoms including dysuria, urgency, frequency, hematuria.  She describes the abdominal pain as a 6 out of 10 pain on her left upper quadrant and left flank.  Patient has a history of cholecystectomy, hysterectomy.  She has not any leg pain or swelling.  No recent travel, surgery, other form of prolonged immobilization.    Allergies:  Amoxicillin  Penicillins     Medications:    Amlodipine   lipitor  Zyloprim   Glipizide  Synthroid   lisinopril   Metformin   Inderal   requip     Past Medical History:    Diabetes   High cholesterol   Hypertension   RLS  Gout   Sleep apnea   Hypothyroidism     Past Surgical History:    Tubal ligation   KIMBERLY and BSO  Ankle fracture treatment   Lap cholecystectomy     Family History:    Hyperlipidemia    Thyroid disease   Diabetes   Heart disease  Stroke     Social History:  Smoking Status: former  Smokeless Tobacco: Never Used  Alcohol Use: No    Review of Systems   Constitutional: Positive for activity change, appetite change, chills, fatigue and fever. Negative for diaphoresis.   HENT: Positive for congestion.    Respiratory: Positive for cough and shortness of breath.    Cardiovascular: Negative for chest pain.   Gastrointestinal: Positive for abdominal pain  "and nausea. Negative for vomiting.   Genitourinary: Positive for flank pain. Negative for dysuria, frequency, hematuria and urgency.   Neurological: Positive for light-headedness and headaches. Negative for dizziness, syncope, weakness and numbness.       Physical Exam     Patient Vitals for the past 24 hrs:   BP Temp Temp src Pulse Resp SpO2 Height Weight   11/16/20 1525 106/56 98.5  F (36.9  C) Oral 93 16 95 % 1.575 m (5' 2\") 99.3 kg (219 lb)   11/16/20 1430 128/80 -- -- 95 -- 95 % -- --   11/16/20 1415 -- -- -- -- -- 95 % -- --   11/16/20 1400 135/71 -- -- 106 -- 96 % -- --   11/16/20 1345 -- -- -- -- -- 95 % -- --   11/16/20 1330 (!) 157/75 -- -- 107 -- 95 % -- --   11/16/20 1315 (!) 156/81 -- -- 106 20 96 % -- --   11/16/20 1300 -- -- -- 105 22 97 % -- --   11/16/20 1245 -- -- -- 106 12 97 % -- --   11/16/20 1215 -- -- -- 109 (!) 39 91 % -- --   11/16/20 1200 (!) 150/81 -- -- 104 29 93 % -- --   11/16/20 1100 (!) 170/67 -- -- 113 23 95 % -- --   11/16/20 1049 -- -- -- 133 24 96 % -- --   11/16/20 1047 -- -- -- 113 30 96 % -- --   11/16/20 1046 -- 99.2  F (37.3  C) -- -- -- -- -- --   11/16/20 1044 (!) 155/76 -- -- 121 20 (!) 88 % -- 100.7 kg (222 lb)        Physical Exam  Constitutional: Alert, appears uncomfortable. SpO2 88% on room air. Placed on 2 L NC, SpO2 94-95%.  HENT:    Nose: Nose normal.    Mouth/Throat: Oropharynx is clear, mucous membranes are dry mucus membranes.  Eyes: EOM are normal. Pupils are equal, round, and reactive to light.   CV: Tachycardiac rate and regular rhythm, no murmurs, rubs or gallops.  Resp: Clear lungs to auscultation, all lung fields. Increased respiratory effort.   GI: Soft, non-distended. There is mild LUQ tenderness. No rebound or guarding.   MSK: Normal range of motion. No deformity.   Neurological:   A/Ox3  5/5 strength is symmetric to the upper and lower extremities;   Sensation intact to light touch throughout the upper and lower extremities;   Skin: Skin is warm and " dry.      Emergency Department Course   ECG:  ECG taken at 1048, ECG read at 1140  Sinus tachycardia  Otherwise normal ECG  Vent. rate 111 BPM  SC interval 138 ms  QRS duration 74 ms  QT/QTc 328/446 ms  P-R-T axes 72 42 55    Imaging:  Radiology findings were communicated with the patient who voiced understanding of the findings.    CT Chest Pulmonary Embolism w Contrast  Final Result  IMPRESSION:  1.  No pulmonary embolism demonstrated.  2.  Patchy peripheral interstitial and airspace infiltrates which are  nonspecific. Differential includes COVID-19 pneumonia.  SHREE DAMON MD  Reading per radiology     Laboratory:  Laboratory findings were communicated with the patient who voiced understanding of the findings.    Symptomatic COVID-19 Virus (Coronavirus) by PCR Nasopharyngeal swab: pending      CBC:  WBC 5.9, HGB 13.2, , o/w WNL     CMP: Glucose 143 (H), potassium 2.9 (L), ALT 75 (H), AST 84 (H), o/w WNL (Creatinine: 0.58)     Lipase: 99      Lactic Acid whole blood (1052): 1.2      Troponin(1052):  <0.015      CRP inflammation: 26.0 (H)    D dimer quantitative: 0.9 (H)    Lactate dehydrogenase: 352 (H)    Erythrocyte sedimentation rate auto: 49 (H)       Interventions:  1058 0.9% sodium chloride BOLUS 1000 mL IV   1325 0.9% sodium chloride BOLUS 1000 mL IV   1325 Potassium chloride ER 10 mEq IV      Emergency Department Course:  Past medical records, nursing notes, and vitals reviewed.    1107 I performed an exam of the patient as documented above.    IV was inserted and blood was drawn for laboratory testing, results above.     The patient was sent for imaging while in the emergency department, results above.      The patient was sent for imaging while in the emergency department, results above.       1312 Patient rechecked and updated.      1330 I spoke with Dr. Lion of the Hospitalist service from [facility] regarding patient's presentation, findings, and plan of care.       Findings and plan explained  to the Patient who consents to admission. Discussed the patient with Dr. Lion, who will admit the patient to a medical bed for further monitoring, evaluation, and treatment.      Impression & Plan   Covid-19  Kyrie Wilkes was evaluated during a global COVID-19 pandemic, which necessitated consideration that the patient might be at risk for infection with the SARS-CoV-2 virus that causes COVID-19.   Applicable protocols for evaluation were followed during the patient's care.   COVID-19 was considered as part of the patient's evaluation. The plan for testing is:  a test was obtained during this visit.    Medical Decision Making:  This is a 70-year-old female who comes in with multiple complaints.  Exam as above.  I have a significant concern for Covid.  Due to her hypoxia I also believe she could be at risk for secondary pulmonary embolism.  Because of this and her high pretest probability she received a CT chest to rule out pulmonary embolism and to assess the degree of Covid involvement of her lungs.  Labs were also obtained.  Troponin negative.  IV fluids were initiated.  She was started on oxygen by nasal cannula.  Patient admitted to the hospitalist team due to hypokalemia, hypoxia secondary to Covid pneumonia.  Patient received IV potassium while in the emergency department as well.      Discharge Diagnosis:    ICD-10-CM    1. Pneumonia due to 2019 novel coronavirus  U07.1     J12.89    2. Acute respiratory failure with hypoxia (H)  J96.01    3. Hypokalemia  E87.6        Disposition:  Admitted.    Scribe Disclosure:  Chong WASHBURN, am serving as a scribe at 11:07 AM on 11/16/2020 to document services personally performed by Jax Valentino DO based on my observations and the provider's statements to me.      11/16/2020   Jax Valentino DO Gibbons, David, DO  11/16/20 3986

## 2020-11-17 LAB
ALBUMIN SERPL-MCNC: 3.3 G/DL (ref 3.4–5)
ALP SERPL-CCNC: 77 U/L (ref 40–150)
ALT SERPL W P-5'-P-CCNC: 63 U/L (ref 0–50)
ANION GAP SERPL CALCULATED.3IONS-SCNC: 9 MMOL/L (ref 3–14)
AST SERPL W P-5'-P-CCNC: 61 U/L (ref 0–45)
BASOPHILS # BLD AUTO: 0 10E9/L (ref 0–0.2)
BASOPHILS NFR BLD AUTO: 0.2 %
BILIRUB SERPL-MCNC: 0.5 MG/DL (ref 0.2–1.3)
BUN SERPL-MCNC: 8 MG/DL (ref 7–30)
CALCIUM SERPL-MCNC: 8.1 MG/DL (ref 8.5–10.1)
CHLORIDE SERPL-SCNC: 106 MMOL/L (ref 94–109)
CO2 SERPL-SCNC: 22 MMOL/L (ref 20–32)
CREAT SERPL-MCNC: 0.47 MG/DL (ref 0.52–1.04)
CRP SERPL-MCNC: 43.5 MG/L (ref 0–8)
D DIMER PPP FEU-MCNC: 0.7 UG/ML FEU (ref 0–0.5)
DIFFERENTIAL METHOD BLD: NORMAL
EOSINOPHIL # BLD AUTO: 0 10E9/L (ref 0–0.7)
EOSINOPHIL NFR BLD AUTO: 0 %
ERYTHROCYTE [DISTWIDTH] IN BLOOD BY AUTOMATED COUNT: 14 % (ref 10–15)
GFR SERPL CREATININE-BSD FRML MDRD: >90 ML/MIN/{1.73_M2}
GLUCOSE BLDC GLUCOMTR-MCNC: 129 MG/DL (ref 70–99)
GLUCOSE BLDC GLUCOMTR-MCNC: 132 MG/DL (ref 70–99)
GLUCOSE BLDC GLUCOMTR-MCNC: 177 MG/DL (ref 70–99)
GLUCOSE BLDC GLUCOMTR-MCNC: 183 MG/DL (ref 70–99)
GLUCOSE BLDC GLUCOMTR-MCNC: 200 MG/DL (ref 70–99)
GLUCOSE SERPL-MCNC: 138 MG/DL (ref 70–99)
HCT VFR BLD AUTO: 39.3 % (ref 35–47)
HGB BLD-MCNC: 12.6 G/DL (ref 11.7–15.7)
IL6 SERPL-MCNC: 86.39 PG/ML
IMM GRANULOCYTES # BLD: 0 10E9/L (ref 0–0.4)
IMM GRANULOCYTES NFR BLD: 0.2 %
INR PPP: 1.09 (ref 0.86–1.14)
LDH SERPL L TO P-CCNC: 233 U/L (ref 81–234)
LYMPHOCYTES # BLD AUTO: 1 10E9/L (ref 0.8–5.3)
LYMPHOCYTES NFR BLD AUTO: 22 %
MCH RBC QN AUTO: 29.2 PG (ref 26.5–33)
MCHC RBC AUTO-ENTMCNC: 32.1 G/DL (ref 31.5–36.5)
MCV RBC AUTO: 91 FL (ref 78–100)
MONOCYTES # BLD AUTO: 0.5 10E9/L (ref 0–1.3)
MONOCYTES NFR BLD AUTO: 10.8 %
NEUTROPHILS # BLD AUTO: 3.2 10E9/L (ref 1.6–8.3)
NEUTROPHILS NFR BLD AUTO: 66.8 %
NRBC # BLD AUTO: 0 10*3/UL
NRBC BLD AUTO-RTO: 0 /100
PLATELET # BLD AUTO: 219 10E9/L (ref 150–450)
POTASSIUM SERPL-SCNC: 3.8 MMOL/L (ref 3.4–5.3)
POTASSIUM SERPL-SCNC: 4 MMOL/L (ref 3.4–5.3)
PROT SERPL-MCNC: 7.4 G/DL (ref 6.8–8.8)
RBC # BLD AUTO: 4.32 10E12/L (ref 3.8–5.2)
RETICS # AUTO: 46.2 10E9/L (ref 25–95)
RETICS/RBC NFR AUTO: 1.1 % (ref 0.5–2)
SODIUM SERPL-SCNC: 137 MMOL/L (ref 133–144)
TROPONIN I SERPL-MCNC: <0.015 UG/L (ref 0–0.04)
WBC # BLD AUTO: 4.7 10E9/L (ref 4–11)

## 2020-11-17 PROCEDURE — 258N000003 HC RX IP 258 OP 636: Performed by: INTERNAL MEDICINE

## 2020-11-17 PROCEDURE — 85610 PROTHROMBIN TIME: CPT | Performed by: INTERNAL MEDICINE

## 2020-11-17 PROCEDURE — 85025 COMPLETE CBC W/AUTO DIFF WBC: CPT | Performed by: INTERNAL MEDICINE

## 2020-11-17 PROCEDURE — 250N000011 HC RX IP 250 OP 636: Performed by: HOSPITALIST

## 2020-11-17 PROCEDURE — 250N000013 HC RX MED GY IP 250 OP 250 PS 637: Performed by: INTERNAL MEDICINE

## 2020-11-17 PROCEDURE — 36415 COLL VENOUS BLD VENIPUNCTURE: CPT | Performed by: INTERNAL MEDICINE

## 2020-11-17 PROCEDURE — 80053 COMPREHEN METABOLIC PANEL: CPT | Performed by: INTERNAL MEDICINE

## 2020-11-17 PROCEDURE — 120N000001 HC R&B MED SURG/OB

## 2020-11-17 PROCEDURE — 84484 ASSAY OF TROPONIN QUANT: CPT | Performed by: INTERNAL MEDICINE

## 2020-11-17 PROCEDURE — 85045 AUTOMATED RETICULOCYTE COUNT: CPT | Performed by: INTERNAL MEDICINE

## 2020-11-17 PROCEDURE — 999N001017 HC STATISTIC GLUCOSE BY METER IP

## 2020-11-17 PROCEDURE — 250N000012 HC RX MED GY IP 250 OP 636 PS 637: Performed by: INTERNAL MEDICINE

## 2020-11-17 PROCEDURE — 99232 SBSQ HOSP IP/OBS MODERATE 35: CPT | Performed by: HOSPITALIST

## 2020-11-17 PROCEDURE — 85379 FIBRIN DEGRADATION QUANT: CPT | Performed by: INTERNAL MEDICINE

## 2020-11-17 PROCEDURE — 83615 LACTATE (LD) (LDH) ENZYME: CPT | Performed by: INTERNAL MEDICINE

## 2020-11-17 PROCEDURE — 250N000009 HC RX 250: Performed by: INTERNAL MEDICINE

## 2020-11-17 PROCEDURE — 86140 C-REACTIVE PROTEIN: CPT | Performed by: INTERNAL MEDICINE

## 2020-11-17 PROCEDURE — 84132 ASSAY OF SERUM POTASSIUM: CPT | Performed by: INTERNAL MEDICINE

## 2020-11-17 RX ADMIN — IPRATROPIUM BROMIDE AND ALBUTEROL 2 PUFF: 20; 100 SPRAY, METERED RESPIRATORY (INHALATION) at 09:11

## 2020-11-17 RX ADMIN — THERA TABS 1 TABLET: TAB at 18:16

## 2020-11-17 RX ADMIN — FAMOTIDINE 10 MG: 10 TABLET, FILM COATED ORAL at 09:09

## 2020-11-17 RX ADMIN — PROPRANOLOL HYDROCHLORIDE 60 MG: 20 TABLET ORAL at 20:24

## 2020-11-17 RX ADMIN — Medication 100 MG: at 19:49

## 2020-11-17 RX ADMIN — IPRATROPIUM BROMIDE AND ALBUTEROL 2 PUFF: 20; 100 SPRAY, METERED RESPIRATORY (INHALATION) at 18:16

## 2020-11-17 RX ADMIN — ALLOPURINOL 100 MG: 100 TABLET ORAL at 19:48

## 2020-11-17 RX ADMIN — ATORVASTATIN CALCIUM 20 MG: 20 TABLET, FILM COATED ORAL at 19:49

## 2020-11-17 RX ADMIN — INSULIN ASPART 1 UNITS: 100 INJECTION, SOLUTION INTRAVENOUS; SUBCUTANEOUS at 18:49

## 2020-11-17 RX ADMIN — FAMOTIDINE 10 MG: 10 TABLET, FILM COATED ORAL at 20:24

## 2020-11-17 RX ADMIN — IPRATROPIUM BROMIDE AND ALBUTEROL 2 PUFF: 20; 100 SPRAY, METERED RESPIRATORY (INHALATION) at 12:24

## 2020-11-17 RX ADMIN — IPRATROPIUM BROMIDE AND ALBUTEROL 2 PUFF: 20; 100 SPRAY, METERED RESPIRATORY (INHALATION) at 21:55

## 2020-11-17 RX ADMIN — PROPRANOLOL HYDROCHLORIDE 60 MG: 20 TABLET ORAL at 09:09

## 2020-11-17 RX ADMIN — REMDESIVIR 100 MG: 100 INJECTION, POWDER, LYOPHILIZED, FOR SOLUTION INTRAVENOUS at 18:16

## 2020-11-17 RX ADMIN — LEVOTHYROXINE SODIUM 150 MCG: 150 TABLET ORAL at 09:09

## 2020-11-17 RX ADMIN — AMLODIPINE BESYLATE 10 MG: 10 TABLET ORAL at 19:48

## 2020-11-17 RX ADMIN — DEXAMETHASONE 6 MG: 2 TABLET ORAL at 09:09

## 2020-11-17 RX ADMIN — LISINOPRIL 20 MG: 20 TABLET ORAL at 09:09

## 2020-11-17 RX ADMIN — ENOXAPARIN SODIUM 40 MG: 40 INJECTION SUBCUTANEOUS at 12:28

## 2020-11-17 ASSESSMENT — ACTIVITIES OF DAILY LIVING (ADL)
ADLS_ACUITY_SCORE: 15

## 2020-11-17 NOTE — CONSULTS
Care Management Initial Consult    General Information  Assessment completed with: Patient(Via phone),    Type of CM/SW Visit: Initial Assessment  Primary Care Provider verified and updated as needed: Yes   Readmission within the last 30 days: no previous admission in last 30 days      Reason for Consult: discharge planning  Advance Care Planning:       General Information Comments: high readmission risk    Communication Assessment  Patient's communication style: spoken language (English or Bilingual)    Hearing Difficulty or Deaf: no   Wear Glasses or Blind: yes    Cognitive  Cognitive/Neuro/Behavioral: WDL                      Living Environment:   People in home: spouse  Juan   Current living Arrangements: house      Able to return to prior arrangements: yes  Living Arrangement Comments: Pt reports her spouse's incontinence is becoming very problematic    Family/Social Support:  Care provided by:    Provides care for: spouse  Marital Status:   Children          Description of Support System: Supportive         Current Resources:   Skilled Home Care Services:  NA  Community Resources:  Occasionally hires cleaning help  Equipment currently used at home: none  Supplies currently used at home: None    Employment/Financial:  Employment Status:     NA     Financial Concerns: No concerns identified           Lifestyle & Psychosocial Needs:        Socioeconomic History     Marital status:      Spouse name: Not on file     Number of children: Not on file     Years of education: Not on file     Highest education level: Not on file     Tobacco Use     Smoking status: Former Smoker     Smokeless tobacco: Never Used     Tobacco comment: quit 2007   Substance and Sexual Activity     Alcohol use: Not Currently     Drug use: Not Currently     Sexual activity: Not Currently       Functional Status:  Prior to admission patient needed assistance: No             Mental Health Status:  No concerns identified other than  caring for her spouse can be stressful                        Values/Beliefs:  Spiritual, Cultural Beliefs, Voodoo Practices, Values that affect care: no               Additional Information:  Pt reports she checks her BGMs AM and PM and keeps her Hgb A1C at 7.00%.  She reports she is feeling much better. Her spouse is also admitted with Covid.  She is hoping she is able to discharge home a day or two before her spouse to enable her to clean up her home.  Pt's spouse has some problems with incontinence that was difficult to stay on top of prior to admission.   Pt does drive.  She plans to call for a taxi for transportation home.  They have one son that tends to work long hours, so she does not want to bother him.      A virtual follow-up appointment has been scheduled on 11/23/20 with pt's PCP Briseyda Aguilar.      Mayra Hernandez RN

## 2020-11-17 NOTE — PLAN OF CARE
Cognitive Concerns/ Orientation : A&Ox4  BEHAVIOR & AGGRESSION TOOL COLOR: Green   CIWA SCORE: n/a  ABNL VS/O2: Afebrile, VSS on 1-2L O2 NC.   MOBILITY: Independent in room   PAIN MANAGMENT: Denies pain.   DIET: Mod-Carb diet. No appetite for breakfast, had a small lunch. Intake encouraged.   BOWEL/BLADDER: Continent. Up to bathroom. Reports loose stools, scheduled am bowel meds held. Has some abd discomfort, denies nausea.   ABNL LAB/BG: COVID Positive. K and Mag protocols: K recheck this am 3.8, recheck again in the am; Mag last evening 1.9, recheck in the am. ALT 63, AST 61. INR 1.09. D Dimer 0.7. , 129.  DRAIN/DEVICES: PIV, saline locked.   TELEMETRY RHYTHM: n/a  SKIN: pale, bruises, intact  TESTS/PROCEDURES: n/a  D/C DAY/GOALS/PLACE: 1-2 days to home, pending improved respiratory status   OTHER IMPORTANT INFO: LS diminished, DE LEON, infrequent nonproductive cough; DE LEON and cough improving per pt. Continues on oral dexamethasone, IV remdesivir. Special precautions in place.

## 2020-11-17 NOTE — PLAN OF CARE
"Cognitive Concerns/ Orientation : A&Ox4, pleasant and appreciative of cares.    BEHAVIOR & AGGRESSION TOOL COLOR: Green   CIWA SCORE: NA    ABNL VS/O2: VSS with BP in the 140's. Scheduled meds given. On 2L O2 via NC with SaO2 in the 90's. LS diminished. Infreq dry cough.   MOBILITY: Independent in room   PAIN MANAGMENT: Some generalized stomach irratability.   DIET: Mod-Carb. No appetite this evening. Reports loss of taste.   BOWEL/BLADDER: Reports loose stools and \"unsettled\" stomach. Voiding appropriately. Using bathroom.   ABNL LAB/BG: COVID Positive. Potassium 2.9, 3.2, replaced and awaiting recheck. ALT 75, AST 84, A1c 7.0, CRP inf 26.0, Lact Dehy 352, D-Dimer 0.9, , 165  DRAIN/DEVICES: PIV in RUE infusing NS +20K at 100mL/hr.   TELEMETRY RHYTHM: NA   SKIN: WDL   TESTS/PROCEDURES: CT completed in ED. Negative for PE. Patchy infiltrates.   D/C DAY/GOALS/PLACE: Pending   OTHER IMPORTANT INFO: Special precautions in place. Calls as needed.   "

## 2020-11-17 NOTE — PLAN OF CARE
"Summary: SOB with Covid type symptoms  DATE & TIME: 11/16/2020 [id number]    Cognitive Concerns/ Orientation : A&Ox4, pleasant and appreciative of cares.    BEHAVIOR & AGGRESSION TOOL COLOR: Green   CIWA SCORE: NA    ABNL VS/O2: VSS with BP in the 140's. Scheduled meds given. On 2L O2 via NC with SaO2 in the 90's. LS diminished. Infreq dry cough.   MOBILITY: Independent in room   PAIN MANAGMENT: Some generalized stomach irratability.   DIET: Mod-Carb. No appetite this evening. Reports loss of taste.   BOWEL/BLADDER: Reports loose stools and \"unsettled\" stomach. Voiding appropriately. Using bathroom.   ABNL LAB/BG: COVID Positive. Potassium 2.9, 3.2, after rechecl 4.0, ALT 75, AST 84, A1c 7.0, CRP inf 26.0, Lact Dehy 352, D-Dimer 0.9, BGL: 171  DRAIN/DEVICES: PIV Saline Locked.   TELEMETRY RHYTHM: NA   SKIN: WDL   TESTS/PROCEDURES: CT completed in ED. Negative for PE. Patchy infiltrates.   D/C DAY/GOALS/PLACE: Pending   OTHER IMPORTANT INFO: Special precautions in place. Calls as needed.   "

## 2020-11-17 NOTE — PROGRESS NOTES
[facility]    Medicine Progress Note - Hospitalist Service       Date of Admission:  11/16/2020  Assessment & Plan       Kyrie Wilkes is a 70 year old female admitted on 11/16/2020.  Past history of DM II, HTN, HLD, RLS, LATRELL who presents with dyspnea and cough, fever, nausea/vomiting and diarrhea x1 week, found to have hypoxic respiratory failure due to COVID19.      Acute hypoxic respiratory failure due to COVID19 pneumonia  Presents with above symptoms, hypoxic to 88% on room air upon admission.  CT chest negative for PE, showing patchy infiltrates with COVID19 PCR positive.  Inflammatory markers elevated.   - continue dexamethasone and remdesivir (initiated 11/16)  - increase lovenox to bid for ppx  - respimat puff qid  - wean oxygen as able    Hypokalemia  - replace per protocol    DM II  A1C 7.0% this admission.  Managed on glipizide and metformin.  - medium ssi    HTN  - continue PTA amlodipine, lisinopril and propranolol    Hypothyroidism  - continue PTA levothyroxine    HLD  - continue PTA atorvastatin    Fatty liver with elevated LFT's  Stable at baseline.  - monitor on remdesivir    Gout  - continue PTA allopurinol    RLS  - continue PTA Requip    LATRELL  No compliant with CPAP       Diet: Combination Diet Regular Diet Adult; 3168-6230 Calories: Moderate Consistent CHO (4-6 CHO units/meal)    DVT Prophylaxis: Enoxaparin (Lovenox) SQ  Arrington Catheter: not present  Code Status: No CPR- Do NOT Intubate           Disposition Plan   Expected discharge: 1-2 days, recommended to prior living arrangement once resp failure resolved.  Entered: Hugo Hou MD 11/17/2020, 12:02 PM       The patient's care was discussed with the Bedside Nurse and Patient.    Hugo Hou MD  Hospitalist Service  [facility]  Contact information available via [facility] Paging/Directory    ______________________________________________________________________    Interval History   Reports  feeling much better today with less dyspnea.  Denies any fever/chills.  Taste/smell are improving, no generalized aches.  Ongoing diarrhea.  Lower abdominal pain improving, no bloody stools.  Denies urinary symptoms.    Data reviewed today: I reviewed all medications, new labs and imaging results over the last 24 hours. I personally reviewed no images or EKG's today.    Physical Exam   Vital Signs: Temp: 98  F (36.7  C) Temp src: Oral BP: (!) 145/72 Pulse: 88   Resp: 18 SpO2: 94 % O2 Device: Nasal cannula Oxygen Delivery: 1 LPM  Weight: 219 lbs 0 oz  General Appearance: Obese female in NAD  Respiratory: few bibasilar crackles, no wheezing or tachypnea  Cardiovascular: RRR, normal s1/s2 without murmur  GI: abdomen soft, normal bowel sounds, suprapubic tenderness, nondistended  Skin: no rash of abdominal folds  Other: Alert and appropriate, cranial nerves grossly intact     Data   Recent Labs   Lab 11/17/20  0735 11/17/20  0306 11/16/20  1547 11/16/20  1052   WBC 4.7  --   --  5.9   HGB 12.6  --   --  13.2   MCV 91  --   --  90     --   --  213   INR 1.09  --   --   --      --   --  134   POTASSIUM 3.8 4.0 3.2* 2.9*   CHLORIDE 106  --   --  99   CO2 22  --   --  26   BUN 8  --   --  7   CR 0.47*  --  0.60 0.58   ANIONGAP 9  --   --  9   JEN 8.1*  --   --  8.6   *  --   --  143*   ALBUMIN 3.3*  --   --  3.5   PROTTOTAL 7.4  --   --  7.9   BILITOTAL 0.5  --   --  0.7   ALKPHOS 77  --   --  83   ALT 63*  --   --  75*   AST 61*  --   --  84*   LIPASE  --   --   --  99   TROPI <0.015  --   --  <0.015

## 2020-11-18 VITALS
TEMPERATURE: 98.3 F | RESPIRATION RATE: 18 BRPM | BODY MASS INDEX: 39.75 KG/M2 | WEIGHT: 216 LBS | SYSTOLIC BLOOD PRESSURE: 147 MMHG | HEIGHT: 62 IN | DIASTOLIC BLOOD PRESSURE: 80 MMHG | OXYGEN SATURATION: 95 % | HEART RATE: 87 BPM

## 2020-11-18 LAB
ALT SERPL W P-5'-P-CCNC: 64 U/L (ref 0–50)
ANION GAP SERPL CALCULATED.3IONS-SCNC: 7 MMOL/L (ref 3–14)
AST SERPL W P-5'-P-CCNC: 62 U/L (ref 0–45)
BASOPHILS # BLD AUTO: 0 10E9/L (ref 0–0.2)
BASOPHILS NFR BLD AUTO: 0.2 %
BUN SERPL-MCNC: 10 MG/DL (ref 7–30)
CALCIUM SERPL-MCNC: 8.8 MG/DL (ref 8.5–10.1)
CHLORIDE SERPL-SCNC: 106 MMOL/L (ref 94–109)
CO2 SERPL-SCNC: 25 MMOL/L (ref 20–32)
CREAT SERPL-MCNC: 0.48 MG/DL (ref 0.52–1.04)
CRP SERPL-MCNC: 10.8 MG/L (ref 0–8)
D DIMER PPP FEU-MCNC: 0.6 UG/ML FEU (ref 0–0.5)
DIFFERENTIAL METHOD BLD: NORMAL
EOSINOPHIL # BLD AUTO: 0 10E9/L (ref 0–0.7)
EOSINOPHIL NFR BLD AUTO: 0 %
ERYTHROCYTE [DISTWIDTH] IN BLOOD BY AUTOMATED COUNT: 14 % (ref 10–15)
GFR SERPL CREATININE-BSD FRML MDRD: >90 ML/MIN/{1.73_M2}
GLUCOSE BLDC GLUCOMTR-MCNC: 118 MG/DL (ref 70–99)
GLUCOSE BLDC GLUCOMTR-MCNC: 156 MG/DL (ref 70–99)
GLUCOSE SERPL-MCNC: 128 MG/DL (ref 70–99)
HCT VFR BLD AUTO: 39.1 % (ref 35–47)
HGB BLD-MCNC: 12.9 G/DL (ref 11.7–15.7)
IMM GRANULOCYTES # BLD: 0 10E9/L (ref 0–0.4)
IMM GRANULOCYTES NFR BLD: 0.4 %
INR PPP: 1.09 (ref 0.86–1.14)
LDH SERPL L TO P-CCNC: 274 U/L (ref 81–234)
LYMPHOCYTES # BLD AUTO: 1.9 10E9/L (ref 0.8–5.3)
LYMPHOCYTES NFR BLD AUTO: 17.8 %
MAGNESIUM SERPL-MCNC: 1.9 MG/DL (ref 1.6–2.3)
MCH RBC QN AUTO: 29.8 PG (ref 26.5–33)
MCHC RBC AUTO-ENTMCNC: 33 G/DL (ref 31.5–36.5)
MCV RBC AUTO: 90 FL (ref 78–100)
MONOCYTES # BLD AUTO: 0.8 10E9/L (ref 0–1.3)
MONOCYTES NFR BLD AUTO: 7.3 %
NEUTROPHILS # BLD AUTO: 7.9 10E9/L (ref 1.6–8.3)
NEUTROPHILS NFR BLD AUTO: 74.3 %
NRBC # BLD AUTO: 0 10*3/UL
NRBC BLD AUTO-RTO: 0 /100
PLATELET # BLD AUTO: 322 10E9/L (ref 150–450)
POTASSIUM SERPL-SCNC: 3.4 MMOL/L (ref 3.4–5.3)
RBC # BLD AUTO: 4.33 10E12/L (ref 3.8–5.2)
RETICS # AUTO: 60.2 10E9/L (ref 25–95)
RETICS/RBC NFR AUTO: 1.4 % (ref 0.5–2)
SODIUM SERPL-SCNC: 138 MMOL/L (ref 133–144)
WBC # BLD AUTO: 10.6 10E9/L (ref 4–11)

## 2020-11-18 PROCEDURE — 85379 FIBRIN DEGRADATION QUANT: CPT | Performed by: INTERNAL MEDICINE

## 2020-11-18 PROCEDURE — 999N001017 HC STATISTIC GLUCOSE BY METER IP

## 2020-11-18 PROCEDURE — 36415 COLL VENOUS BLD VENIPUNCTURE: CPT | Performed by: INTERNAL MEDICINE

## 2020-11-18 PROCEDURE — 250N000011 HC RX IP 250 OP 636: Performed by: HOSPITALIST

## 2020-11-18 PROCEDURE — 85025 COMPLETE CBC W/AUTO DIFF WBC: CPT | Performed by: INTERNAL MEDICINE

## 2020-11-18 PROCEDURE — 86140 C-REACTIVE PROTEIN: CPT | Performed by: INTERNAL MEDICINE

## 2020-11-18 PROCEDURE — 85045 AUTOMATED RETICULOCYTE COUNT: CPT | Performed by: INTERNAL MEDICINE

## 2020-11-18 PROCEDURE — 83735 ASSAY OF MAGNESIUM: CPT | Performed by: INTERNAL MEDICINE

## 2020-11-18 PROCEDURE — 84450 TRANSFERASE (AST) (SGOT): CPT | Performed by: INTERNAL MEDICINE

## 2020-11-18 PROCEDURE — 80048 BASIC METABOLIC PNL TOTAL CA: CPT | Performed by: INTERNAL MEDICINE

## 2020-11-18 PROCEDURE — 250N000013 HC RX MED GY IP 250 OP 250 PS 637: Performed by: INTERNAL MEDICINE

## 2020-11-18 PROCEDURE — 85610 PROTHROMBIN TIME: CPT | Performed by: INTERNAL MEDICINE

## 2020-11-18 PROCEDURE — 84460 ALANINE AMINO (ALT) (SGPT): CPT | Performed by: INTERNAL MEDICINE

## 2020-11-18 PROCEDURE — 83615 LACTATE (LD) (LDH) ENZYME: CPT | Performed by: INTERNAL MEDICINE

## 2020-11-18 PROCEDURE — 99238 HOSP IP/OBS DSCHRG MGMT 30/<: CPT | Performed by: HOSPITALIST

## 2020-11-18 PROCEDURE — 250N000012 HC RX MED GY IP 250 OP 636 PS 637: Performed by: INTERNAL MEDICINE

## 2020-11-18 RX ADMIN — ENOXAPARIN SODIUM 40 MG: 40 INJECTION SUBCUTANEOUS at 01:25

## 2020-11-18 RX ADMIN — LISINOPRIL 20 MG: 20 TABLET ORAL at 08:06

## 2020-11-18 RX ADMIN — DEXAMETHASONE 6 MG: 2 TABLET ORAL at 08:06

## 2020-11-18 RX ADMIN — LEVOTHYROXINE SODIUM 150 MCG: 150 TABLET ORAL at 06:24

## 2020-11-18 RX ADMIN — FAMOTIDINE 10 MG: 10 TABLET, FILM COATED ORAL at 08:06

## 2020-11-18 RX ADMIN — IPRATROPIUM BROMIDE AND ALBUTEROL 2 PUFF: 20; 100 SPRAY, METERED RESPIRATORY (INHALATION) at 08:07

## 2020-11-18 RX ADMIN — PROPRANOLOL HYDROCHLORIDE 60 MG: 20 TABLET ORAL at 08:06

## 2020-11-18 ASSESSMENT — ACTIVITIES OF DAILY LIVING (ADL)
ADLS_ACUITY_SCORE: 15

## 2020-11-18 ASSESSMENT — MIFFLIN-ST. JEOR: SCORE: 1463.02

## 2020-11-18 NOTE — PROGRESS NOTES
Care Management Discharge Note    Discharge Date: 11/18/20       Discharge Disposition: Home    Discharge Services: None    Discharge DME: None    Discharge Transportation: other (see comments)(Pt plans to call a taxi)      Yes  Education Provided on the Discharge Plan:   yes  Persons Notified of Discharge Plans: Pt, Nsg  Patient/Family in Agreement with the Plan: yes    Handoff Referral Completed: Yes    Additional Information:  A virtual follow-up appointment has been scheduled on 11/23/20 with pt's PCP Briseyda Hernandez RN

## 2020-11-18 NOTE — PLAN OF CARE
DATE & TIME: 11/18/2020 [id number]  Cognitive Concerns/ Orientation : A&Ox4  BEHAVIOR & AGGRESSION TOOL COLOR: Green   CIWA SCORE: n/a  ABNL VS/O2: VSS on 1L O2 NC.   MOBILITY: Independent in room   PAIN MANAGMENT: Denies pain.   DIET: Mod-Carb diet.   BOWEL/BLADDER: Continent. Up to bathroom.   ABNL LAB/BG: COVID Positive.   DRAIN/DEVICES: PIV, saline locked.   TELEMETRY RHYTHM: n/a  SKIN: pale, bruises, intact  TESTS/PROCEDURES: n/a  D/C DAY/GOALS/PLACE: Pending to home once improved respiratory status   OTHER IMPORTANT INFO: LS diminished, DE LEON, infrequent nonproductive cough; DE LEON and cough improving per pt. Continues on oral dexamethasone, IV remdesivir. Special precautions in place.

## 2020-11-18 NOTE — DISCHARGE SUMMARY
[facility]  Hospitalist Discharge Summary      Date of Admission:  11/16/2020  Date of Discharge:  11/18/2020  Discharging Provider: Hugo Hou MD      Discharge Diagnoses   Acute hypoxic respiratory failure due to COVID19 pneumonia  Hypokalemia  DM II  HTN  Hypothyroidism  HLD  Fatty liver with elevated LFT's  Gout   RLS  LATRELL    Follow-ups Needed After Discharge   Follow-up Appointments     Follow-up and recommended labs and tests       Follow up with primary care provider, Briseyda Fang [facility], as scheduled for you on Monday 11/23/20 at 3:00 PM, for hospital   follow- up.  This is a virtual appointment.  Please call [facility] for   more info about virtual appointments at [phone]).           Discharge Disposition   Discharged to home  Condition at discharge: Stable    Hospital Course   Kyrie Wilkes is a 70 year old female admitted on 11/16/2020.  Past history of DM II, HTN, HLD, RLS, LATRELL who presents with dyspnea and cough, fever, nausea/vomiting and diarrhea x1 week, found to have hypoxic respiratory failure due to COVID19.        Acute hypoxic respiratory failure due to COVID19 pneumonia  Presents with above symptoms, hypoxic to 88% on room air upon admission.  CT chest negative for PE, showing patchy infiltrates with COVID19 PCR positive.  Inflammatory markers elevated.  She was initiated on dexamethasone and remdesivir with rapid improvement, weaning to room air by day of discharge.  As such, will not continue with treatments.  She reports she is quite active during the day and already takes multiple doses Excedrin for headache so will not discharge on anticoagulation.  Will continue respimat inhaler prn, but unlikely this will be needed going forward.      Hypokalemia  Resolved with supplementation.    All other chronic medical issues remain stable with no medication adjustments made.       Consultations This Hospital Stay   CARE MANAGEMENT /  SOCIAL WORK IP CONSULT    Code Status   No CPR- Do NOT Intubate    Time Spent on this Encounter   I, Hugo Hou MD, personally saw the patient today and spent less than or equal to 30 minutes discharging this patient.       Hugo Hou MD  [place] [address] [facility]  [address] [address]  Phone: [phone]  ______________________________________________________________________    Physical Exam   Vital Signs: Temp: 98.3  F (36.8  C) Temp src: Oral BP: (!) 147/80 Pulse: 87   Resp: 18 SpO2: 95 % O2 Device: None (Room air) Oxygen Delivery: 1 LPM  Weight: 216 lbs 0 oz  General Appearance: Obese female in NAD  Respiratory: lungs CTAB, no wheezes or crackles, no tachypnea  Cardiovascular: RRR, normal s1/s2 without murmur  GI: abdomen soft, normal bowel sounds  Skin: no peripheral edema  Other: Alert and appropriate, cranial nerves grossly intact        Primary Care Physician   [facility]    Discharge Orders      COVID-19 GetWell Loop Referral      Follow-up and recommended labs and tests     Follow up with primary care provider, Briseyda Fang, [facility], as scheduled for you on Monday 11/23/20 at 3:00 PM, for hospital follow- up.  This is a virtual appointment.  Please call [facility] for more info about virtual appointments at ([phone]).     Reason for your hospital stay    You were admitted for COVID19 pneumonia.     Discharge - Quarantine/Isolation Instruction    Date of symptom onset:     Date of first positive test:    If you tested positive COVID-19 and show symptoms (fever, cough, body aches or trouble breathing):        Stay home and away from others (self-isolate) until:        At least 10 days have passed since your symptoms started. AND...        You've had no fever-and no medicine that reduces fever-for 3 full days (72 hours). AND...         Your other symptoms have resolved (gotten better).  If you tested positive for  COVID-19 but don't show symptoms:       Stay home and away from others (self-isolate) until at least 10 days have passed since the date of your first positive COVID-19 test.     Contact provider    Contact your primary care provider if If increased trouble breathing, new arm/leg swelling, dizziness/passing out, falls, bleeding that doesn't stop, or uncontrolled pain.     Activity    Your activity upon discharge: activity as tolerated     Diet    Follow this diet upon discharge:       Combination Diet Regular Diet Adult; 9055-1470 Calories: Moderate Consistent CHO (4-6 CHO units/meal)       Significant Results and Procedures   Most Recent 3 CBC's:  Recent Labs   Lab Test 11/18/20  0914 11/17/20  0735 11/16/20  1052   WBC 10.6 4.7 5.9   HGB 12.9 12.6 13.2   MCV 90 91 90    219 213     Most Recent 3 BMP's:  Recent Labs   Lab Test 11/18/20  0914 11/17/20  0735 11/17/20  0306 11/16/20  1547 11/16/20  1052    137  --   --  134   POTASSIUM 3.4 3.8 4.0 3.2* 2.9*   CHLORIDE 106 106  --   --  99   CO2 25 22  --   --  26   BUN 10 8  --   --  7   CR 0.48* 0.47*  --  0.60 0.58   ANIONGAP 7 9  --   --  9   JEN 8.8 8.1*  --   --  8.6   * 138*  --   --  143*     Most Recent D-dimer:  Recent Labs   Lab Test 11/18/20  0914   DD 0.6*     Most Recent ESR & CRP:  Recent Labs   Lab Test 11/18/20  0914 11/16/20  1052 11/16/20  1052   SED  --   --  49*   CRP 10.8*   < > 26.0*    < > = values in this interval not displayed.   ,   Results for orders placed or performed during the hospital encounter of 11/16/20   CT Chest Pulmonary Embolism w Contrast    Narrative    CT CHEST PULMONARY EMBOLISM WITH CONTRAST  11/16/2020 12:42 PM    CLINICAL HISTORY: PE suspected, high pretest probability.    TECHNIQUE: CT angiogram chest during arterial phase injection IV  contrast. 2D and 3D MIP reconstructions were performed by the CT  technologist. Dose reduction techniques were used.     CONTRAST: _______ mL Isovue-[id number]    COMPARISON:  None.    FINDINGS:  ANGIOGRAM CHEST: Pulmonary arteries are normal caliber and negative  for pulmonary emboli. Thoracic aorta is negative for dissection. No CT  evidence of right heart strain.    LUNGS AND PLEURA: Peripheral airspace and interstitial infiltrates in  a patchy distribution, this is nonspecific. Differential includes  COVID-19 pneumonia.    MEDIASTINUM/AXILLAE: A few mildly prominent lymph nodes are noted,  likely reactive in this setting. No aneurysm. There are moderate  coronary vascular calcifications consistent with coronary artery  disease.    UPPER ABDOMEN: No acute findings.    MUSCULOSKELETAL: No frankly destructive bony lesions.      Impression    IMPRESSION:  1.  No pulmonary embolism demonstrated.  2.  Patchy peripheral interstitial and airspace infiltrates which are  nonspecific. Differential includes COVID-19 pneumonia.    SHREE DAMON MD       Discharge Medications   Current Discharge Medication List      START taking these medications    Details   ipratropium-albuterol (COMBIVENT RESPIMAT)  MCG/ACT inhaler Inhale 2 puffs into the lungs every 6 hours as needed for shortness of breath / dyspnea or wheezing    Associated Diagnoses: Pneumonia due to 2019 novel coronavirus         CONTINUE these medications which have NOT CHANGED    Details   allopurinol (ZYLOPRIM) 100 MG tablet Take 100 mg by mouth every evening      amLODIPine (NORVASC) 10 MG tablet Take 10 mg by mouth every evening      atorvastatin (LIPITOR) 20 MG tablet Take 20 mg by mouth every evening      cyanocobalamin (VITAMIN B-12) 1000 MCG tablet Take 1,000 mcg by mouth every evening      glipiZIDE (GLUCOTROL XL) 5 MG 24 hr tablet Take 5 mg by mouth daily      GLUCOSAMINE CHONDROITIN COMPLX PO Take 1 tablet by mouth 2 times daily      levothyroxine (SYNTHROID/LEVOTHROID) 150 MCG tablet Take 150 mcg by mouth daily      lisinopril (ZESTRIL) 20 MG tablet Take 20 mg by mouth daily      !! metFORMIN (GLUCOPHAGE) 500 MG tablet  Take 1,000 mg by mouth daily (with breakfast)      !! metFORMIN (GLUCOPHAGE) 500 MG tablet Take 1,500 mg by mouth daily (with dinner)      MILK THISTLE PO Take 1 tablet by mouth 2 times daily      multivitamin, therapeutic (THERA-VIT) TABS tablet Take 1 tablet by mouth every evening      propranolol (INDERAL) 60 MG tablet Take 60 mg by mouth 2 times daily      pyridOXINE (VITAMIN B6) 100 MG TABS Take 100 mg by mouth every evening      rOPINIRole (REQUIP) 0.25 MG tablet Take 0.25 mg by mouth 3 times daily as needed      !! Vitamin D, Cholecalciferol, 25 MCG (1000 UT) CAPS Take 2,000 Units by mouth every morning      !! Vitamin D, Cholecalciferol, 25 MCG (1000 UT) CAPS Take 1,000 Units by mouth every evening       !! - Potential duplicate medications found. Please discuss with provider.        Allergies   Allergies   Allergen Reactions     Amoxicillin Swelling and Rash     Penicillins Swelling and Rash

## 2020-11-19 DIAGNOSIS — U07.1 2019 NOVEL CORONAVIRUS DISEASE (COVID-19): Primary | ICD-10-CM

## 2020-11-20 ENCOUNTER — PATIENT OUTREACH (OUTPATIENT)
Dept: CARE COORDINATION | Facility: CLINIC | Age: 70
End: 2020-11-20

## 2020-11-20 NOTE — PROGRESS NOTES
Clinic Care Coordination Contact  [facility]    Clinical Data: Care Coordinator Outreach  Outreach attempted x 1.  Patient unavailable  Plan: Care Coordinator will try to reach patient again in 1-2 business days.

## 2020-11-23 NOTE — PROGRESS NOTES
Clinic Care Coordination Contact  Community Health Worker Initial Outreach            Patient accepts CC: No, due to working with a provider outside of [facility]. The patient has a follow up appointment with her provider on 11/23/20. The patient stated that she is doing well at this time, and that her  is still in the hospital. There are no other questions at this time, the CHW will not sent out a letter due to the patient not wanting one at this time.

## 2022-10-03 ENCOUNTER — LAB (OUTPATIENT)
Dept: URGENT CARE | Facility: URGENT CARE | Age: 72
End: 2022-10-03
Payer: MEDICARE

## 2022-10-03 DIAGNOSIS — Z20.822 ENCOUNTER FOR LABORATORY TESTING FOR COVID-19 VIRUS: ICD-10-CM

## 2022-10-03 PROCEDURE — U0003 INFECTIOUS AGENT DETECTION BY NUCLEIC ACID (DNA OR RNA); SEVERE ACUTE RESPIRATORY SYNDROME CORONAVIRUS 2 (SARS-COV-2) (CORONAVIRUS DISEASE [COVID-19]), AMPLIFIED PROBE TECHNIQUE, MAKING USE OF HIGH THROUGHPUT TECHNOLOGIES AS DESCRIBED BY CMS-2020-01-R: HCPCS

## 2022-10-03 PROCEDURE — U0005 INFEC AGEN DETEC AMPLI PROBE: HCPCS

## 2022-10-04 LAB — SARS-COV-2 RNA RESP QL NAA+PROBE: NEGATIVE

## 2022-10-04 RX ORDER — MULTIVIT WITH MINERALS/LUTEIN
1 TABLET ORAL EVERY EVENING
Status: ON HOLD | COMMUNITY
End: 2024-06-20

## 2022-10-04 RX ORDER — MAGNESIUM CARB/ALUMINUM HYDROX 105-160MG
1 TABLET,CHEWABLE ORAL 2 TIMES DAILY
COMMUNITY

## 2022-10-04 RX ORDER — VITAMIN B COMPLEX
2 TABLET ORAL EVERY MORNING
Status: ON HOLD | COMMUNITY
End: 2024-06-20

## 2022-10-04 RX ORDER — MULTIVITAMIN WITH IRON
100 TABLET ORAL EVERY EVENING
Status: ON HOLD | COMMUNITY
End: 2024-06-20

## 2022-10-04 RX ORDER — POTASSIUM CHLORIDE 1500 MG/1
40 TABLET, EXTENDED RELEASE ORAL 3 TIMES DAILY
COMMUNITY
End: 2022-10-04

## 2022-10-04 NOTE — PROGRESS NOTES
PTA medications updated by Medication Scribe prior to surgery via phone call with patient (last doses completed by Nurse)     Medication history sources: Patient, Surescripts, H&P and Patient's home med list  In the past week, patient estimated taking medication this percent of the time: Greater than 90%  Adherence assessment: N/A Not Observed    Significant changes made to the medication list:  None      Additional medication history information:   None    Medication reconciliation completed by provider prior to medication history? No    Time spent in this activity: 40 MINUTES    The information provided in this note is only as accurate as the sources available at the time of update(s)      Prior to Admission medications    Medication Sig Last Dose Taking? Auth Provider Long Term End Date   allopurinol (ZYLOPRIM) 100 MG tablet Take 100 mg by mouth every evening  at PM Yes Unknown, Entered By History     amLODIPine (NORVASC) 10 MG tablet Take 10 mg by mouth every evening  at PM Yes Unknown, Entered By History Yes    aspirin-acetaminophen-caffeine (EXCEDRIN MIGRAINE) 250-250-65 MG tablet Take 3 tablets by mouth 2 times daily  Yes Reported, Patient     atorvastatin (LIPITOR) 20 MG tablet Take 20 mg by mouth every evening  at PM Yes Unknown, Entered By History Yes    glipiZIDE (GLUCOTROL XL) 5 MG 24 hr tablet Take 5 mg by mouth daily  at PM Yes Unknown, Entered By History Yes    glucosamine-chondroitin 500-400 MG tablet Take 1 tablet by mouth 2 times daily  at PM Yes Reported, Patient     levothyroxine (SYNTHROID/LEVOTHROID) 125 MCG tablet Take 125 mcg by mouth every morning  at AM Yes Unknown, Entered By History Yes    lisinopril (ZESTRIL) 20 MG tablet Take 20 mg by mouth every morning  at AM Yes Unknown, Entered By History Yes    metFORMIN (GLUCOPHAGE) 500 MG tablet Take 1,000 mg by mouth 2 times daily (500MG X 2 = 1,000MG)  at PM Yes Unknown, Entered By History Yes    multivitamin (CENTRUM SILVER) tablet Take 1  tablet by mouth every evening  at PM Yes Reported, Patient     propranolol (INDERAL) 60 MG tablet Take 60 mg by mouth 2 times daily  at AM Yes Unknown, Entered By History Yes    rOPINIRole (REQUIP) 0.25 MG tablet Take 0.25 mg by mouth 3 times daily as needed  at AM Yes Unknown, Entered By History Yes    vitamin B-12 (CYANOCOBALAMIN) 500 MCG tablet Take 500 mcg by mouth every evening  at PM Yes Reported, Patient     vitamin B6 (PYRIDOXINE) 100 MG tablet Take 100 mg by mouth every evening  at PM Yes Reported, Patient     Vitamin D3 (CHOLECALCIFEROL) 25 mcg (1000 units) tablet Take 1-2 tablets by mouth 2 times daily  Yes Reported, Patient

## 2022-10-05 ENCOUNTER — ANESTHESIA (OUTPATIENT)
Dept: SURGERY | Facility: CLINIC | Age: 72
End: 2022-10-05
Payer: MEDICARE

## 2022-10-05 ENCOUNTER — HOSPITAL ENCOUNTER (OUTPATIENT)
Facility: CLINIC | Age: 72
Discharge: HOME | End: 2022-10-06
Attending: ORTHOPAEDIC SURGERY | Admitting: ORTHOPAEDIC SURGERY
Payer: MEDICARE

## 2022-10-05 ENCOUNTER — APPOINTMENT (OUTPATIENT)
Dept: GENERAL RADIOLOGY | Facility: CLINIC | Age: 72
End: 2022-10-05
Attending: ORTHOPAEDIC SURGERY
Payer: MEDICARE

## 2022-10-05 ENCOUNTER — ANESTHESIA EVENT (OUTPATIENT)
Dept: SURGERY | Facility: CLINIC | Age: 72
End: 2022-10-05
Payer: MEDICARE

## 2022-10-05 DIAGNOSIS — M48.062 SPINAL STENOSIS OF LUMBAR REGION WITH NEUROGENIC CLAUDICATION: Primary | ICD-10-CM

## 2022-10-05 LAB
FASTING STATUS PATIENT QL REPORTED: YES
GLUCOSE BLD-MCNC: 134 MG/DL (ref 70–99)
GLUCOSE BLDC GLUCOMTR-MCNC: 130 MG/DL (ref 70–99)
GLUCOSE BLDC GLUCOMTR-MCNC: 175 MG/DL (ref 70–99)
GLUCOSE BLDC GLUCOMTR-MCNC: 229 MG/DL (ref 70–99)
LACTATE SERPL-SCNC: 0.7 MMOL/L (ref 0.7–2)
POTASSIUM BLD-SCNC: 3.2 MMOL/L (ref 3.4–5.3)

## 2022-10-05 PROCEDURE — 82947 ASSAY GLUCOSE BLOOD QUANT: CPT | Performed by: ANESTHESIOLOGY

## 2022-10-05 PROCEDURE — 250N000009 HC RX 250: Performed by: ANESTHESIOLOGY

## 2022-10-05 PROCEDURE — 250N000011 HC RX IP 250 OP 636: Performed by: ANESTHESIOLOGY

## 2022-10-05 PROCEDURE — 250N000011 HC RX IP 250 OP 636: Performed by: NURSE ANESTHETIST, CERTIFIED REGISTERED

## 2022-10-05 PROCEDURE — 999N000141 HC STATISTIC PRE-PROCEDURE NURSING ASSESSMENT: Performed by: ORTHOPAEDIC SURGERY

## 2022-10-05 PROCEDURE — 258N000003 HC RX IP 258 OP 636: Performed by: ANESTHESIOLOGY

## 2022-10-05 PROCEDURE — 36415 COLL VENOUS BLD VENIPUNCTURE: CPT | Performed by: ORTHOPAEDIC SURGERY

## 2022-10-05 PROCEDURE — 999N000179 XR SURGERY CARM FLUORO LESS THAN 5 MIN W STILLS

## 2022-10-05 PROCEDURE — 82962 GLUCOSE BLOOD TEST: CPT

## 2022-10-05 PROCEDURE — 258N000003 HC RX IP 258 OP 636: Performed by: ORTHOPAEDIC SURGERY

## 2022-10-05 PROCEDURE — 250N000013 HC RX MED GY IP 250 OP 250 PS 637: Performed by: ORTHOPAEDIC SURGERY

## 2022-10-05 PROCEDURE — 710N000009 HC RECOVERY PHASE 1, LEVEL 1, PER MIN: Performed by: ORTHOPAEDIC SURGERY

## 2022-10-05 PROCEDURE — 83605 ASSAY OF LACTIC ACID: CPT | Performed by: ORTHOPAEDIC SURGERY

## 2022-10-05 PROCEDURE — 250N000013 HC RX MED GY IP 250 OP 250 PS 637: Performed by: ANESTHESIOLOGY

## 2022-10-05 PROCEDURE — 250N000009 HC RX 250: Performed by: NURSE ANESTHETIST, CERTIFIED REGISTERED

## 2022-10-05 PROCEDURE — 36415 COLL VENOUS BLD VENIPUNCTURE: CPT | Performed by: ANESTHESIOLOGY

## 2022-10-05 PROCEDURE — 250N000011 HC RX IP 250 OP 636: Performed by: ORTHOPAEDIC SURGERY

## 2022-10-05 PROCEDURE — 84132 ASSAY OF SERUM POTASSIUM: CPT | Mod: 91 | Performed by: ORTHOPAEDIC SURGERY

## 2022-10-05 PROCEDURE — 84132 ASSAY OF SERUM POTASSIUM: CPT | Performed by: ANESTHESIOLOGY

## 2022-10-05 PROCEDURE — 250N000013 HC RX MED GY IP 250 OP 250 PS 637: Performed by: PHYSICIAN ASSISTANT

## 2022-10-05 PROCEDURE — 250N000025 HC SEVOFLURANE, PER MIN: Performed by: ORTHOPAEDIC SURGERY

## 2022-10-05 PROCEDURE — 272N000001 HC OR GENERAL SUPPLY STERILE: Performed by: ORTHOPAEDIC SURGERY

## 2022-10-05 PROCEDURE — 99214 OFFICE O/P EST MOD 30 MIN: CPT | Performed by: PHYSICIAN ASSISTANT

## 2022-10-05 PROCEDURE — 370N000017 HC ANESTHESIA TECHNICAL FEE, PER MIN: Performed by: ORTHOPAEDIC SURGERY

## 2022-10-05 PROCEDURE — 360N000084 HC SURGERY LEVEL 4 W/ FLUORO, PER MIN: Performed by: ORTHOPAEDIC SURGERY

## 2022-10-05 PROCEDURE — 250N000009 HC RX 250: Performed by: ORTHOPAEDIC SURGERY

## 2022-10-05 RX ORDER — METHOCARBAMOL 500 MG/1
500 TABLET, FILM COATED ORAL EVERY 6 HOURS PRN
Status: DISCONTINUED | OUTPATIENT
Start: 2022-10-05 | End: 2022-10-06 | Stop reason: HOSPADM

## 2022-10-05 RX ORDER — NALOXONE HYDROCHLORIDE 0.4 MG/ML
0.2 INJECTION, SOLUTION INTRAMUSCULAR; INTRAVENOUS; SUBCUTANEOUS
Status: DISCONTINUED | OUTPATIENT
Start: 2022-10-05 | End: 2022-10-06 | Stop reason: HOSPADM

## 2022-10-05 RX ORDER — SODIUM CHLORIDE, SODIUM LACTATE, POTASSIUM CHLORIDE, CALCIUM CHLORIDE 600; 310; 30; 20 MG/100ML; MG/100ML; MG/100ML; MG/100ML
INJECTION, SOLUTION INTRAVENOUS CONTINUOUS
Status: DISCONTINUED | OUTPATIENT
Start: 2022-10-05 | End: 2022-10-05 | Stop reason: HOSPADM

## 2022-10-05 RX ORDER — KETAMINE HYDROCHLORIDE 10 MG/ML
INJECTION INTRAMUSCULAR; INTRAVENOUS PRN
Status: DISCONTINUED | OUTPATIENT
Start: 2022-10-05 | End: 2022-10-05

## 2022-10-05 RX ORDER — AMOXICILLIN 250 MG
1 CAPSULE ORAL 2 TIMES DAILY
Status: DISCONTINUED | OUTPATIENT
Start: 2022-10-05 | End: 2022-10-06 | Stop reason: HOSPADM

## 2022-10-05 RX ORDER — ACETAMINOPHEN 325 MG/1
975 TABLET ORAL EVERY 8 HOURS SCHEDULED
Status: DISCONTINUED | OUTPATIENT
Start: 2022-10-05 | End: 2022-10-06 | Stop reason: HOSPADM

## 2022-10-05 RX ORDER — PROPOFOL 10 MG/ML
INJECTION, EMULSION INTRAVENOUS PRN
Status: DISCONTINUED | OUTPATIENT
Start: 2022-10-05 | End: 2022-10-05

## 2022-10-05 RX ORDER — AMLODIPINE BESYLATE 10 MG/1
10 TABLET ORAL EVERY EVENING
Status: DISCONTINUED | OUTPATIENT
Start: 2022-10-05 | End: 2022-10-06 | Stop reason: HOSPADM

## 2022-10-05 RX ORDER — HYDROMORPHONE HYDROCHLORIDE 1 MG/ML
0.5 INJECTION, SOLUTION INTRAMUSCULAR; INTRAVENOUS; SUBCUTANEOUS EVERY 5 MIN PRN
Status: DISCONTINUED | OUTPATIENT
Start: 2022-10-05 | End: 2022-10-05 | Stop reason: HOSPADM

## 2022-10-05 RX ORDER — LIDOCAINE HYDROCHLORIDE 20 MG/ML
INJECTION, SOLUTION INFILTRATION; PERINEURAL PRN
Status: DISCONTINUED | OUTPATIENT
Start: 2022-10-05 | End: 2022-10-05

## 2022-10-05 RX ORDER — LIDOCAINE 40 MG/G
CREAM TOPICAL
Status: DISCONTINUED | OUTPATIENT
Start: 2022-10-05 | End: 2022-10-06 | Stop reason: HOSPADM

## 2022-10-05 RX ORDER — LISINOPRIL 20 MG/1
20 TABLET ORAL EVERY MORNING
Status: DISCONTINUED | OUTPATIENT
Start: 2022-10-06 | End: 2022-10-06 | Stop reason: HOSPADM

## 2022-10-05 RX ORDER — BUPIVACAINE HYDROCHLORIDE AND EPINEPHRINE 5; 5 MG/ML; UG/ML
INJECTION, SOLUTION PERINEURAL PRN
Status: DISCONTINUED | OUTPATIENT
Start: 2022-10-05 | End: 2022-10-05 | Stop reason: HOSPADM

## 2022-10-05 RX ORDER — ACETAMINOPHEN 325 MG/1
975 TABLET ORAL ONCE
Status: COMPLETED | OUTPATIENT
Start: 2022-10-05 | End: 2022-10-05

## 2022-10-05 RX ORDER — PROPRANOLOL HYDROCHLORIDE 20 MG/1
60 TABLET ORAL 2 TIMES DAILY
Status: DISCONTINUED | OUTPATIENT
Start: 2022-10-05 | End: 2022-10-06 | Stop reason: HOSPADM

## 2022-10-05 RX ORDER — NALOXONE HYDROCHLORIDE 0.4 MG/ML
0.4 INJECTION, SOLUTION INTRAMUSCULAR; INTRAVENOUS; SUBCUTANEOUS
Status: DISCONTINUED | OUTPATIENT
Start: 2022-10-05 | End: 2022-10-06 | Stop reason: HOSPADM

## 2022-10-05 RX ORDER — FENTANYL CITRATE 0.05 MG/ML
25 INJECTION, SOLUTION INTRAMUSCULAR; INTRAVENOUS EVERY 5 MIN PRN
Status: DISCONTINUED | OUTPATIENT
Start: 2022-10-05 | End: 2022-10-05 | Stop reason: HOSPADM

## 2022-10-05 RX ORDER — CLINDAMYCIN PHOSPHATE 900 MG/50ML
900 INJECTION, SOLUTION INTRAVENOUS ONCE
Status: COMPLETED | OUTPATIENT
Start: 2022-10-05 | End: 2022-10-05

## 2022-10-05 RX ORDER — POLYETHYLENE GLYCOL 3350 17 G/17G
17 POWDER, FOR SOLUTION ORAL DAILY
Status: DISCONTINUED | OUTPATIENT
Start: 2022-10-06 | End: 2022-10-06 | Stop reason: HOSPADM

## 2022-10-05 RX ORDER — DEXTROSE MONOHYDRATE 25 G/50ML
25-50 INJECTION, SOLUTION INTRAVENOUS
Status: DISCONTINUED | OUTPATIENT
Start: 2022-10-05 | End: 2022-10-06 | Stop reason: HOSPADM

## 2022-10-05 RX ORDER — ACETAMINOPHEN 325 MG/1
650 TABLET ORAL EVERY 4 HOURS PRN
Status: DISCONTINUED | OUTPATIENT
Start: 2022-10-08 | End: 2022-10-06 | Stop reason: HOSPADM

## 2022-10-05 RX ORDER — ONDANSETRON 2 MG/ML
INJECTION INTRAMUSCULAR; INTRAVENOUS PRN
Status: DISCONTINUED | OUTPATIENT
Start: 2022-10-05 | End: 2022-10-05

## 2022-10-05 RX ORDER — POTASSIUM CHLORIDE 1500 MG/1
20 TABLET, EXTENDED RELEASE ORAL ONCE
Status: COMPLETED | OUTPATIENT
Start: 2022-10-05 | End: 2022-10-05

## 2022-10-05 RX ORDER — ONDANSETRON 2 MG/ML
4 INJECTION INTRAMUSCULAR; INTRAVENOUS EVERY 30 MIN PRN
Status: DISCONTINUED | OUTPATIENT
Start: 2022-10-05 | End: 2022-10-05 | Stop reason: HOSPADM

## 2022-10-05 RX ORDER — PROCHLORPERAZINE MALEATE 5 MG
5 TABLET ORAL EVERY 6 HOURS PRN
Status: DISCONTINUED | OUTPATIENT
Start: 2022-10-05 | End: 2022-10-06 | Stop reason: HOSPADM

## 2022-10-05 RX ORDER — EPHEDRINE SULFATE 50 MG/ML
INJECTION, SOLUTION INTRAMUSCULAR; INTRAVENOUS; SUBCUTANEOUS PRN
Status: DISCONTINUED | OUTPATIENT
Start: 2022-10-05 | End: 2022-10-05

## 2022-10-05 RX ORDER — OXYCODONE HYDROCHLORIDE 5 MG/1
5 TABLET ORAL EVERY 4 HOURS PRN
Status: DISCONTINUED | OUTPATIENT
Start: 2022-10-05 | End: 2022-10-06 | Stop reason: HOSPADM

## 2022-10-05 RX ORDER — HYDROXYZINE HYDROCHLORIDE 10 MG/1
10 TABLET, FILM COATED ORAL EVERY 6 HOURS PRN
Status: DISCONTINUED | OUTPATIENT
Start: 2022-10-05 | End: 2022-10-06 | Stop reason: HOSPADM

## 2022-10-05 RX ORDER — BISACODYL 10 MG
10 SUPPOSITORY, RECTAL RECTAL DAILY PRN
Status: DISCONTINUED | OUTPATIENT
Start: 2022-10-05 | End: 2022-10-06 | Stop reason: HOSPADM

## 2022-10-05 RX ORDER — DEXAMETHASONE SODIUM PHOSPHATE 4 MG/ML
INJECTION, SOLUTION INTRA-ARTICULAR; INTRALESIONAL; INTRAMUSCULAR; INTRAVENOUS; SOFT TISSUE PRN
Status: DISCONTINUED | OUTPATIENT
Start: 2022-10-05 | End: 2022-10-05

## 2022-10-05 RX ORDER — CALCIUM CARBONATE 500 MG/1
500 TABLET, CHEWABLE ORAL 4 TIMES DAILY PRN
Status: DISCONTINUED | OUTPATIENT
Start: 2022-10-05 | End: 2022-10-06 | Stop reason: HOSPADM

## 2022-10-05 RX ORDER — ROPINIROLE 0.25 MG/1
0.25 TABLET, FILM COATED ORAL 3 TIMES DAILY PRN
Status: DISCONTINUED | OUTPATIENT
Start: 2022-10-05 | End: 2022-10-06 | Stop reason: HOSPADM

## 2022-10-05 RX ORDER — HYDROMORPHONE HYDROCHLORIDE 1 MG/ML
INJECTION, SOLUTION INTRAMUSCULAR; INTRAVENOUS; SUBCUTANEOUS PRN
Status: DISCONTINUED | OUTPATIENT
Start: 2022-10-05 | End: 2022-10-05

## 2022-10-05 RX ORDER — OXYCODONE HYDROCHLORIDE 5 MG/1
10 TABLET ORAL EVERY 4 HOURS PRN
Status: DISCONTINUED | OUTPATIENT
Start: 2022-10-05 | End: 2022-10-06 | Stop reason: HOSPADM

## 2022-10-05 RX ORDER — CEFAZOLIN SODIUM/WATER 2 G/20 ML
2 SYRINGE (ML) INTRAVENOUS SEE ADMIN INSTRUCTIONS
Status: DISCONTINUED | OUTPATIENT
Start: 2022-10-05 | End: 2022-10-05

## 2022-10-05 RX ORDER — ONDANSETRON 2 MG/ML
4 INJECTION INTRAMUSCULAR; INTRAVENOUS EVERY 6 HOURS PRN
Status: DISCONTINUED | OUTPATIENT
Start: 2022-10-05 | End: 2022-10-06 | Stop reason: HOSPADM

## 2022-10-05 RX ORDER — NICOTINE POLACRILEX 4 MG
15-30 LOZENGE BUCCAL
Status: DISCONTINUED | OUTPATIENT
Start: 2022-10-05 | End: 2022-10-06 | Stop reason: HOSPADM

## 2022-10-05 RX ORDER — ATORVASTATIN CALCIUM 20 MG/1
20 TABLET, FILM COATED ORAL EVERY EVENING
Status: DISCONTINUED | OUTPATIENT
Start: 2022-10-05 | End: 2022-10-06 | Stop reason: HOSPADM

## 2022-10-05 RX ORDER — ONDANSETRON 4 MG/1
4 TABLET, ORALLY DISINTEGRATING ORAL EVERY 6 HOURS PRN
Status: DISCONTINUED | OUTPATIENT
Start: 2022-10-05 | End: 2022-10-06 | Stop reason: HOSPADM

## 2022-10-05 RX ORDER — ALLOPURINOL 100 MG/1
100 TABLET ORAL EVERY EVENING
Status: DISCONTINUED | OUTPATIENT
Start: 2022-10-05 | End: 2022-10-06 | Stop reason: HOSPADM

## 2022-10-05 RX ORDER — CEFAZOLIN SODIUM/WATER 2 G/20 ML
2 SYRINGE (ML) INTRAVENOUS
Status: DISCONTINUED | OUTPATIENT
Start: 2022-10-05 | End: 2022-10-05

## 2022-10-05 RX ORDER — FENTANYL CITRATE 50 UG/ML
INJECTION, SOLUTION INTRAMUSCULAR; INTRAVENOUS PRN
Status: DISCONTINUED | OUTPATIENT
Start: 2022-10-05 | End: 2022-10-05

## 2022-10-05 RX ORDER — SODIUM CHLORIDE, SODIUM LACTATE, POTASSIUM CHLORIDE, CALCIUM CHLORIDE 600; 310; 30; 20 MG/100ML; MG/100ML; MG/100ML; MG/100ML
INJECTION, SOLUTION INTRAVENOUS CONTINUOUS
Status: DISCONTINUED | OUTPATIENT
Start: 2022-10-05 | End: 2022-10-06 | Stop reason: HOSPADM

## 2022-10-05 RX ORDER — ONDANSETRON 4 MG/1
4 TABLET, ORALLY DISINTEGRATING ORAL EVERY 30 MIN PRN
Status: DISCONTINUED | OUTPATIENT
Start: 2022-10-05 | End: 2022-10-05 | Stop reason: HOSPADM

## 2022-10-05 RX ADMIN — ACETAMINOPHEN 975 MG: 325 TABLET, FILM COATED ORAL at 22:10

## 2022-10-05 RX ADMIN — FENTANYL CITRATE 50 MCG: 50 INJECTION, SOLUTION INTRAMUSCULAR; INTRAVENOUS at 12:46

## 2022-10-05 RX ADMIN — PHENYLEPHRINE HYDROCHLORIDE 0.3 MCG/KG/MIN: 10 INJECTION INTRAVENOUS at 12:24

## 2022-10-05 RX ADMIN — LIDOCAINE HYDROCHLORIDE 100 MG: 20 INJECTION, SOLUTION INFILTRATION; PERINEURAL at 12:14

## 2022-10-05 RX ADMIN — PROPRANOLOL HYDROCHLORIDE 60 MG: 20 TABLET ORAL at 23:23

## 2022-10-05 RX ADMIN — AMLODIPINE BESYLATE 10 MG: 10 TABLET ORAL at 20:23

## 2022-10-05 RX ADMIN — FENTANYL CITRATE 25 MCG: 50 INJECTION, SOLUTION INTRAMUSCULAR; INTRAVENOUS at 12:30

## 2022-10-05 RX ADMIN — SODIUM CHLORIDE, POTASSIUM CHLORIDE, SODIUM LACTATE AND CALCIUM CHLORIDE: 600; 310; 30; 20 INJECTION, SOLUTION INTRAVENOUS at 17:27

## 2022-10-05 RX ADMIN — ALLOPURINOL 100 MG: 100 TABLET ORAL at 20:23

## 2022-10-05 RX ADMIN — PROPOFOL 200 MG: 10 INJECTION, EMULSION INTRAVENOUS at 12:14

## 2022-10-05 RX ADMIN — ROCURONIUM BROMIDE 30 MG: 50 INJECTION, SOLUTION INTRAVENOUS at 12:14

## 2022-10-05 RX ADMIN — SUGAMMADEX 50 MG: 100 INJECTION, SOLUTION INTRAVENOUS at 14:06

## 2022-10-05 RX ADMIN — FENTANYL CITRATE 25 MCG: 50 INJECTION, SOLUTION INTRAMUSCULAR; INTRAVENOUS at 13:24

## 2022-10-05 RX ADMIN — ONDANSETRON 4 MG: 2 INJECTION INTRAMUSCULAR; INTRAVENOUS at 13:50

## 2022-10-05 RX ADMIN — LIDOCAINE HYDROCHLORIDE 0.5 ML: 10 INJECTION, SOLUTION EPIDURAL; INFILTRATION; INTRACAUDAL; PERINEURAL at 10:10

## 2022-10-05 RX ADMIN — DEXMEDETOMIDINE HYDROCHLORIDE 0.2 MCG/KG/HR: 100 INJECTION, SOLUTION INTRAVENOUS at 12:28

## 2022-10-05 RX ADMIN — DEXAMETHASONE SODIUM PHOSPHATE 8 MG: 4 INJECTION, SOLUTION INTRA-ARTICULAR; INTRALESIONAL; INTRAMUSCULAR; INTRAVENOUS; SOFT TISSUE at 12:14

## 2022-10-05 RX ADMIN — PHENYLEPHRINE HYDROCHLORIDE 100 MCG: 10 INJECTION INTRAVENOUS at 12:19

## 2022-10-05 RX ADMIN — SODIUM CHLORIDE, POTASSIUM CHLORIDE, SODIUM LACTATE AND CALCIUM CHLORIDE: 600; 310; 30; 20 INJECTION, SOLUTION INTRAVENOUS at 13:30

## 2022-10-05 RX ADMIN — SUGAMMADEX 150 MG: 100 INJECTION, SOLUTION INTRAVENOUS at 13:59

## 2022-10-05 RX ADMIN — ACETAMINOPHEN 975 MG: 325 TABLET, FILM COATED ORAL at 10:03

## 2022-10-05 RX ADMIN — SODIUM CHLORIDE, POTASSIUM CHLORIDE, SODIUM LACTATE AND CALCIUM CHLORIDE: 600; 310; 30; 20 INJECTION, SOLUTION INTRAVENOUS at 10:10

## 2022-10-05 RX ADMIN — Medication 5 MG: at 12:40

## 2022-10-05 RX ADMIN — PHENYLEPHRINE HYDROCHLORIDE 150 MCG: 10 INJECTION INTRAVENOUS at 12:24

## 2022-10-05 RX ADMIN — CLINDAMYCIN PHOSPHATE 900 MG: 900 INJECTION, SOLUTION INTRAVENOUS at 11:21

## 2022-10-05 RX ADMIN — POTASSIUM CHLORIDE 20 MEQ: 1500 TABLET, EXTENDED RELEASE ORAL at 10:43

## 2022-10-05 RX ADMIN — Medication 10 MG: at 13:19

## 2022-10-05 RX ADMIN — OXYCODONE HYDROCHLORIDE 5 MG: 5 TABLET ORAL at 23:24

## 2022-10-05 RX ADMIN — PHENYLEPHRINE HYDROCHLORIDE 100 MCG: 10 INJECTION INTRAVENOUS at 12:55

## 2022-10-05 RX ADMIN — FENTANYL CITRATE 25 MCG: 50 INJECTION, SOLUTION INTRAMUSCULAR; INTRAVENOUS at 15:12

## 2022-10-05 RX ADMIN — PHENYLEPHRINE HYDROCHLORIDE 150 MCG: 10 INJECTION INTRAVENOUS at 12:38

## 2022-10-05 RX ADMIN — SENNOSIDES AND DOCUSATE SODIUM 1 TABLET: 50; 8.6 TABLET ORAL at 20:23

## 2022-10-05 RX ADMIN — Medication 20 MG: at 12:33

## 2022-10-05 RX ADMIN — ATORVASTATIN CALCIUM 20 MG: 20 TABLET, FILM COATED ORAL at 20:24

## 2022-10-05 RX ADMIN — HYDROMORPHONE HYDROCHLORIDE 0.5 MG: 1 INJECTION, SOLUTION INTRAMUSCULAR; INTRAVENOUS; SUBCUTANEOUS at 13:42

## 2022-10-05 ASSESSMENT — ACTIVITIES OF DAILY LIVING (ADL)
ADLS_ACUITY_SCORE: 22
ADLS_ACUITY_SCORE: 24
ADLS_ACUITY_SCORE: 24
ADLS_ACUITY_SCORE: 33
ADLS_ACUITY_SCORE: 22

## 2022-10-05 ASSESSMENT — COPD QUESTIONNAIRES: COPD: 0

## 2022-10-05 ASSESSMENT — LIFESTYLE VARIABLES: TOBACCO_USE: 1

## 2022-10-05 NOTE — OP NOTE
Orthopedic Surgery Operative Report    Patient:   Kyrie Wilkes  MRN:   [id number]   :  1950  Facility: [facility]   Date:  10/05/22         PREOPERATIVE DIAGNOSIS:    Lumber stenosis L4-5    POSTOPERATIVE DIAGNOSIS:    Lumbar stenosis L4-5    PROCEDURE PERFORMED:    Bilateral laminotomies L4-5    SURGEON:    Brian Bland MD    SURGICAL ASSISTANT:    DANNA Dimas     ANESTHESIA:  General    FINDINGS:    Severe central stenosis L4-5    COMPLICATIONS:     None    SPECIMENS:    None    ESTIMATED BLOOD LOSS:  20 cc    IMPLANTS:    None    INDICATION FOR OPERATION:  The patient is a 72 year old female with neurogenic claudication from the above diagnosis.  Conservative measures were not effective in controlling her symptoms.  I discussed with her the risks, benefits, and alternatives of the above operation and she wished to proceed.    DESCRIPTION OF PROCEDURE:    The patient was met in the preoperative holding area and the operative site was confirmed and marked.  We once again reviewed the risks, benefits, and alternatives of the operation and the patient wished to proceed with the surgery.    The patient was taken back to the operating room and induced under general anesthesia.  The patient was positioned prone on the Jose Alfredo frame with all bony prominences well padded.  The surgical site was prepped and draped in the usual standard fashion.      I used a spinal needle for localization with fluoroscopy of the L4-5 level.  I then injected local anesthetic and carried dissection down to the spinous processes.  I created a small rent in the myofascia on each side of the spinous process at the L4-5 level.  I introduced the first METRx dilator down onto the lamina on the left at L4-5 and confirmed this level with an x-ray.  I then dilated up to a 22 mm tubular retractor.    At the L4-5 level, I performed the following:  With the tubular retractor first docked on the left, I  performed a hemilaminotomy using the Midas sandra and Kerrison rongeurs, undercutting the spinous process to decompress centrally, and performed an ipsilateral partial medial facetectomy while taking care to preserve enough pars and facet to minimize the risk of postoperative fracture.  I released the ligamentum flavum from the undersurface of the cranial and caudal lamina, and then the facet ipsilaterally.  I removed the detached ligamentum flavum.  I inspected the neural elements, and resected additional bone from the lateral recess and foramen as necessary to ensure complete decompression of the exiting and traversing nerve roots.  I used a Zehra to feel into the foramen and confirmed there was adequate decompression.  I achieved full deep hemostasis.  I then placed Vistaseal over the neural elements and confirmed no ongoing bleeding.  I then withdrew the tubular retractor slowly, achieving full hemostasis at each tissue level.  I then repeated the process including dilation and x-ray confirmation of the position of the tubular retractor on the right side, and performed a contralateral hemilaminotomy  with the above technique again repeated to complete bilateral hemilaminotomies.    After completing the decompressions, I checked each of my laminotomy sites, and again confirmed there was no ongoing bleeding.    The deep fascia was closed with a running 0-vicyl suture in a watertight fashion, and the subcutaneous tissues were closed with 2-0 vicryl interrupted sutures.  Exofin and steri strips were applied.  The patient was transferred off of the operative table and allowed to emerge from anesthesia.  The patient was extubated and transported to PACU in stable condition.      A surgical assistant was critical for this case to assist in retraction of the soft tissues and evacuating blood from the surgical field to facilitate a safer operation with improved visualization and less time under anesthesia.     All sponge  and needle counts were correct at case conclusion.      POSTOPERATIVE PLAN:  -Activity:   Up with assist  -Weight Bearing Status: WBAT   -Bracing:   None  -Pain control:   Rx for oxycodone  -Dressing:   Ok to shower with dressing in place, dressing ok to get wet.  -Diet:    ADAT    -Disposition:   Home from PACU  -Follow up:   2 weeks in my clinic       Brian Bland MD

## 2022-10-05 NOTE — PROVIDER NOTIFICATION
TORRES Sy notified patient's K 3.2 this am. Order received for 20mEq PO Potassium.      MD Bland notified via text patient's allergies to Amoxicillin and PCN are Anaphylactic, not just a rash as previously charted. Ancef 2g ordered. RN awaiting orders.

## 2022-10-05 NOTE — ANESTHESIA POSTPROCEDURE EVALUATION
Patient: Kyrie Wilkes    Procedure: Procedure(s):  bilateral lumbar 4 to lumbar 5 laminotomy       Anesthesia Type:  General    Note:  Disposition: Inpatient   Postop Pain Control: Uneventful            Sign Out: Well controlled pain   PONV:    Neuro/Psych: Uneventful            Sign Out: Acceptable/Baseline neuro status   Airway/Respiratory: Uneventful            Sign Out: Acceptable/Baseline resp. status   CV/Hemodynamics: Uneventful            Sign Out: Acceptable CV status   Other NRE: NONE   DID A NON-ROUTINE EVENT OCCUR? No           Last vitals:  Vitals Value Taken Time   /77 10/05/22 1530   Temp 36  C (96.8  F) 10/05/22 1515   Pulse 78 10/05/22 1533   Resp 14 10/05/22 1533   SpO2 96 % 10/05/22 1533   Vitals shown include unvalidated device data.    Electronically Signed By: Ace Sy MD  October 5, 2022  3:34 PM

## 2022-10-05 NOTE — ANESTHESIA CARE TRANSFER NOTE
Patient: Kyrie Wilkes    Procedure: Procedure(s):  bilateral lumbar 4 to lumbar 5 laminotomy       Diagnosis: Spondylolisthesis of lumbar region [M43.16]  Diagnosis Additional Information: No value filed.    Anesthesia Type:   General     Note:    Oropharynx: oropharynx clear of all foreign objects  Level of Consciousness: awake  Oxygen Supplementation: face mask  Level of Supplemental Oxygen (L/min / FiO2): 10  Independent Airway: airway patency satisfactory and stable  Dentition: dentition unchanged  Vital Signs Stable: post-procedure vital signs reviewed and stable  Report to RN Given: handoff report given  Patient transferred to: PACU    Handoff Report: Identifed the Patient, Identified the Reponsible Provider, Reviewed the pertinent medical history, Discussed the surgical course, Reviewed Intra-OP anesthesia mangement and issues during anesthesia, Set expectations for post-procedure period and Allowed opportunity for questions and acknowledgement of understanding      Vitals:  Vitals Value Taken Time   BP     Temp     Pulse     Resp     SpO2         Electronically Signed By: KAR Washington CRNA  October 5, 2022  2:12 PM

## 2022-10-05 NOTE — ANESTHESIA PROCEDURE NOTES
Airway       Patient location during procedure: OR       Procedure Start/Stop Times: 10/5/2022 12:17 PM  Staff -        CRNA: María Elena Thompson APRN CRNA       Other Anesthesia Staff: Talon Duggan       Performed By: LYLE  Consent for Airway        Urgency: elective  Indications and Patient Condition       Indications for airway management: maurice-procedural       Induction type:intravenous       Mask difficulty assessment: 2 - vent by mask + OA or adjuvant +/- NMBA    Final Airway Details       Final airway type: endotracheal airway       Successful airway: ETT - single  Endotracheal Airway Details        ETT size (mm): 7.0       Cuffed: yes       Successful intubation technique: direct laryngoscopy       DL Blade Type: [place] 2       Grade View of Cords: 1       Adjucts: stylet       Position: Right       Measured from: gums/teeth       Secured at (cm): 21       Bite block used: None    Post intubation assessment        Placement verified by: capnometry, equal breath sounds and chest rise        Number of attempts at approach: 1       Secured with: silk tape       Ease of procedure: easy       Dentition: Intact and Unchanged    Medication(s) Administered   Medication Administration Time: 10/5/2022 12:17 PM

## 2022-10-05 NOTE — CONSULTS
[facility]    Hospitalist Brief Consult Note    Assessment & Plan   Kyrie Wilkes is a 72 year old female who was admitted on 10/5/2022.     Past medical history significant for Lumbar stenosis, HTN, HLP, DM2, Obesity, LATRELL, Gout, RLS, Hypothyroidism, Fatty Liver, Chronic daily headaches who underwent an elective bilateral L4-L5 laminotomy.    Hospitalist consult placed by Dr. Bland to assist with post-op co-medical management following an elective bilateral L4-L5 laminotomy.      Lumbar stenosis s/p bilateral L4-L5 laminotomy  POD #0.   - Orthopedic Surgery is managing.   --Defer analgesic management, DVT prophylaxis, PT/OT.    - HGB check in the morning.    - Encourage utilization of incentive spirometer.     Hypokalemia  Potassium checked pre-op on day of admission and low at 3.2 and received 20 mEq of potassium chloride PO.    - Monitor and replace per protocol.    HTN  - Resumed on PTA amlodipine 10 mg every evening and propanol 20 mg BID.  Hold parameters in place.     - Hold PTA lisinopril 20 mg every morning.      HLP  - Resumed on PTA atorvastatin 20 mg/d.      DM2  Hyperglycemia  A1c of 6.1% from 6/8/22.    *Patient received intra-op dexamethasone 8 mg, anticipate some degree of hyperglycemia.    - Hold PTA glipizide 5 mg/d and metformin 1000 mg BID.    - Check A1c.    - Medium intensity sliding scale insulin.    - Glucose checks.    - Hypoglycemic protocol.      Obesity with associated LATRELL  Body mass index is 34.08 kg/m .  Patient isnon-compliant with CPAP.  Increase in all-cause morbidity and mortality.   - Encourage weight loss.  - Follow up with PCP regarding ongoing management.      - Monitor O2 saturations.      Gout  - Resumed on PTA allopurinol 100 mg/d.      RLS  - Resumed on PTA PRN Requip 0.25 mg TID.    Hypothyroidism  - Resumed on PTA levothyroxine 125 mcg/d.      Fatty Liver  Continue follow up with PCP.      Chronic daily headaches  - Hold PTA Excedrin migraine and  "likely resume at discharge.      Clinically Significant Risk Factors Present on Admission                 # Hypertension: home medication list includes antihypertensive(s)    # Obesity: Estimated body mass index is 34.08 kg/m  as calculated from the following:    Height as of this encounter: 1.6 m (5' 3\").    Weight as of this encounter: 87.3 kg (192 lb 6.4 oz).          Diet: Regular Diet Adult     DVT Prophylaxis: Defer to primary service   Arrington Catheter: Not present  Central Lines: None  Cardiac Monitoring: None  Code Status: Full Code      Disposition Plan    Per Ortho.      The patient's care was discussed with the Patient.      The patient has been discussed with Dr. Alexandre, who agrees with the assessment and plan at this time.    Patrice Lala PA-C  [facility]  Securely message with the Vocera Web Console (learn more here)  Text page via [employer] Paging/Directory      Interval History   Patient was evaluated in her hospital room where she was resting comfortably in bed upon arrival.  Initially, we reviewed their medical and surgical history as well as their PTA medications.  Patient does not utilize a CPAP machine.    A 10 point review of systems was completed and was positive for daily headaches, ongoing back pain and increased anxiety otherwise was negative.  Patient indicated that she has headaches that are dull in nature and usually affecting the back of her scalp but are present almost every day.    She currently resides in apartment in [place], [place] with her .  Her son lives nearby.  Patient mentions that her  unfortunately has stage IV cancer and she is the primary caretaker.  She also mentioned they recently moved to [place] and sold their house to be closer to their son.    Patient is a former smoker quit in 2007.  She does not consume any alcohol.  She has tried cannabis gummy bears (4 or 5 of them) and attempts to manage her back pain but " "she stated this was ineffective and she does not utilize this any further.  She typically ambulates with the use of a walker and has done so since Mother's Day and is hoping that after the today's surgery she will no longer need this.    Reviewed plans with patient in regards to management of blood pressure (hold parameters), diabetes (holding oral medication and use of subcutaneous insulin).  She was informed to not be surprised if her blood sugars are elevated as she did receive IV steroid intraoperatively.  Patient understood and agreed with this plan.      We reviewed CODE STATUS which patient stated she is DNR/DNI.    -Data reviewed today: I reviewed all new labs and imaging results over the last 24 hours. I personally reviewed no images or EKG's today.    Physical Exam   BP (!) 140/74   Pulse 86   Temp 98.5  F (36.9  C) (Oral)   Resp 20   Ht 1.6 m (5' 3\")   Wt 87.3 kg (192 lb 6.4 oz)   SpO2 95%   BMI 34.08 kg/m        Constitutional: Awake, alert, cooperative, no apparent distress.    ENT: Normocephalic, without obvious abnormality, atraumatic, oral pharynx with moist mucus membranes, tonsils without erythema or exudates.  Eyes pupils are equal, round and reactive to light; extra occular movements intact.  Normal sclera.    Neck: Supple, symmetrical, trachea midline, no adenopathy.  Pulmonary: No increased work of breathing, good air exchange, clear to auscultation bilaterally, no crackles or wheezing.  Cardiovascular: Regular rate and rhythm, normal S1 and S2, no S3 or S4, and no murmur noted.  GI: Normal bowel sounds, soft, non-distended, non-tender.  Obese.  Skin/Integumen: Clear.  Neuro: CN II-XII grossly intact.  Upper extremities strength, coordination and sensation intact bilaterally.  Lower extremities assessment limited due to post-op state.  Patient demonstrated she could wiggle all her toes and dorsal/plantar flexion was even and intact bilaterally.    Psych:  Alert and oriented x 3. Normal " affect.  Extremities: No lower extremity edema noted, and calves are non-tender to palpation bilaterally.           Medications     lactated ringers 100 mL/hr at 10/05/22 1727       acetaminophen  975 mg Oral Q8H CARLTON     allopurinol  100 mg Oral QPM     amLODIPine  10 mg Oral QPM     atorvastatin  20 mg Oral QPM     insulin aspart  1-7 Units Subcutaneous TID AC     insulin aspart  1-5 Units Subcutaneous At Bedtime     [START ON 10/6/2022] levothyroxine  125 mcg Oral QAM     [Held by provider] lisinopril  20 mg Oral QAM     [START ON 10/6/2022] polyethylene glycol  17 g Oral Daily     propranolol  60 mg Oral BID     senna-docusate  1 tablet Oral BID     sodium chloride (PF)  3 mL Intracatheter Q8H       Data   Recent Labs   Lab 10/05/22  1723 10/05/22  1542 10/05/22  0924   POTASSIUM  --   --  3.2*   * 130* 134*       No results found for this or any previous visit (from the past 24 hour(s)).

## 2022-10-05 NOTE — ANESTHESIA PREPROCEDURE EVALUATION
Anesthesia Pre-Procedure Evaluation    Patient: Kyrie Wilkes   MRN: [id number] : 1950        Procedure : Procedure(s):  bilateral lumbar 4 to lumbar 5 laminotomy          Past Medical History:   Diagnosis Date     Claustrophobia     with CPAP, OK with imaging     Diabetes (H)      High cholesterol      Hypertension      Restless legs syndrome (RLS)      Sleep apnea      Thyroid disease       Past Surgical History:   Procedure Laterality Date     CATARACT IOL, RT/LT       CHOLECYSTECTOMY       HYSTERECTOMY, KIMBERLY       OPEN REDUCTION INTERNAL FIXATION ANKLE       SALPINGO-OOPHORECTOMY BILATERAL       TUBAL LIGATION        Allergies   Allergen Reactions     Amoxicillin Anaphylaxis and Swelling     Penicillins Anaphylaxis and Swelling      Social History     Tobacco Use     Smoking status: Former Smoker     Smokeless tobacco: Never Used     Tobacco comment: quit    Substance Use Topics     Alcohol use: Not Currently      Wt Readings from Last 1 Encounters:   10/05/22 87.3 kg (192 lb 6.4 oz)        Anesthesia Evaluation            ROS/MED HX  ENT/Pulmonary: Comment: Glaucoma    (+) sleep apnea, tobacco use, Past use, recent URI, resolved, COVID 19:  (-) asthma and COPD   Neurologic: Comment: RLS  Chronic HA      Cardiovascular:     (+) Dyslipidemia hypertension----- (-) CAD and CHF   METS/Exercise Tolerance:     Hematologic:       Musculoskeletal: Comment: Spondylolisthesis      GI/Hepatic: Comment: NAFLD    (+) liver disease,  (-) GERD   Renal/Genitourinary:       Endo:     (+) type II DM, Not using insulin, - not using insulin pump. thyroid problem, hypothyroidism, Obesity,     Psychiatric/Substance Use:    (-) psychiatric history   Infectious Disease:       Malignancy:       Other:               OUTSIDE LABS:  CBC:   Lab Results   Component Value Date    WBC 10.6 2020    WBC 4.7 2020    HGB 12.9 2020    HGB 12.6 2020    HCT 39.1 2020    HCT 39.3 2020     2020      11/17/2020     BMP:   Lab Results   Component Value Date     11/18/2020     11/17/2020    POTASSIUM 3.2 (L) 10/05/2022    POTASSIUM 3.4 11/18/2020    CHLORIDE 106 11/18/2020    CHLORIDE 106 11/17/2020    CO2 25 11/18/2020    CO2 22 11/17/2020    BUN 10 11/18/2020    BUN 8 11/17/2020    CR 0.48 (L) 11/18/2020    CR 0.47 (L) 11/17/2020     (H) 10/05/2022     (H) 11/18/2020     COAGS:   Lab Results   Component Value Date    INR 1.09 11/18/2020     POC:   Lab Results   Component Value Date     (H) 11/18/2020     HEPATIC:   Lab Results   Component Value Date    ALBUMIN 3.3 (L) 11/17/2020    PROTTOTAL 7.4 11/17/2020    ALT 64 (H) 11/18/2020    AST 62 (H) 11/18/2020    ALKPHOS 77 11/17/2020    BILITOTAL 0.5 11/17/2020     OTHER:   Lab Results   Component Value Date    LACT 1.2 11/16/2020    A1C 7.0 (H) 11/16/2020    JEN 8.8 11/18/2020    MAG 1.9 11/18/2020    LIPASE 99 11/16/2020    CRP 10.8 (H) 11/18/2020    SED 49 (H) 11/16/2020       Anesthesia Plan    ASA Status:  2   NPO Status:  NPO Appropriate    Anesthesia Type: General.     - Airway: ETT   Induction: Intravenous, Propofol.           Consents    Anesthesia Plan(s) and associated risks, benefits, and realistic alternatives discussed. Questions answered and patient/representative(s) expressed understanding.    - Discussed:     - Discussed with:  Patient      - Extended Intubation/Ventilatory Support Discussed: No.      - Patient is DNR/DNI Status: No    Use of blood products discussed: No .     Postoperative Care    Pain management: IV analgesics.   PONV prophylaxis: Ondansetron (or other 5HT-3), Dexamethasone or Solumedrol     Comments:    Other Comments: Patient is counseled on the anesthesia plan and relevant anesthesia procedures including all risks and benefits. All patient questions were answered.     Multi Model Analgesia:  -Tylenol 1000 mg po.  -Precedex gtt at 0.2-0.5 mcg/kg/hour  -Ketamine 30 mg prior to incision  and 15 mg every hour boluses.   -Decadron 8mg IV after induction.   -Dilaudid titrated prn.     MAP goal of >65mm Hg for this case. Use of IV fluids and colloids as well as IV vasoactive drips anticipated to achieve goal.               Ace Sy MD

## 2022-10-05 NOTE — INTERVAL H&P NOTE
"I have reviewed the surgical (or preoperative) H&P that is linked to this encounter, and examined the patient. There are no significant changes    Clinical Conditions Present on Arrival:  Clinically Significant Risk Factors Present on Admission                   # Obesity: Estimated body mass index is 34.08 kg/m  as calculated from the following:    Height as of this encounter: 1.6 m (5' 3\").    Weight as of this encounter: 87.3 kg (192 lb 6.4 oz).       "

## 2022-10-06 ENCOUNTER — APPOINTMENT (OUTPATIENT)
Dept: PHYSICAL THERAPY | Facility: CLINIC | Age: 72
End: 2022-10-06
Attending: ORTHOPAEDIC SURGERY
Payer: MEDICARE

## 2022-10-06 VITALS
HEIGHT: 63 IN | TEMPERATURE: 98.2 F | RESPIRATION RATE: 16 BRPM | HEART RATE: 80 BPM | BODY MASS INDEX: 34.09 KG/M2 | WEIGHT: 192.4 LBS | OXYGEN SATURATION: 94 % | DIASTOLIC BLOOD PRESSURE: 52 MMHG | SYSTOLIC BLOOD PRESSURE: 107 MMHG

## 2022-10-06 LAB
ANION GAP SERPL CALCULATED.3IONS-SCNC: 5 MMOL/L (ref 3–14)
BUN SERPL-MCNC: 9 MG/DL (ref 7–30)
CALCIUM SERPL-MCNC: 9.1 MG/DL (ref 8.5–10.1)
CHLORIDE BLD-SCNC: 100 MMOL/L (ref 94–109)
CO2 SERPL-SCNC: 31 MMOL/L (ref 20–32)
CREAT SERPL-MCNC: 0.54 MG/DL (ref 0.52–1.04)
GFR SERPL CREATININE-BSD FRML MDRD: >90 ML/MIN/1.73M2
GLUCOSE BLD-MCNC: 143 MG/DL (ref 70–99)
GLUCOSE BLDC GLUCOMTR-MCNC: 131 MG/DL (ref 70–99)
HBA1C MFR BLD: 6.1 % (ref 0–5.6)
HGB BLD-MCNC: 10.7 G/DL (ref 11.7–15.7)
POTASSIUM BLD-SCNC: 3.3 MMOL/L (ref 3.4–5.3)
POTASSIUM BLD-SCNC: 3.8 MMOL/L (ref 3.4–5.3)
SODIUM SERPL-SCNC: 136 MMOL/L (ref 133–144)

## 2022-10-06 PROCEDURE — 82962 GLUCOSE BLOOD TEST: CPT

## 2022-10-06 PROCEDURE — 99213 OFFICE O/P EST LOW 20 MIN: CPT | Performed by: PHYSICIAN ASSISTANT

## 2022-10-06 PROCEDURE — 97116 GAIT TRAINING THERAPY: CPT | Mod: GP | Performed by: PHYSICAL THERAPIST

## 2022-10-06 PROCEDURE — 97162 PT EVAL MOD COMPLEX 30 MIN: CPT | Mod: GP | Performed by: PHYSICAL THERAPIST

## 2022-10-06 PROCEDURE — 36415 COLL VENOUS BLD VENIPUNCTURE: CPT | Performed by: PHYSICIAN ASSISTANT

## 2022-10-06 PROCEDURE — 85018 HEMOGLOBIN: CPT | Performed by: PHYSICIAN ASSISTANT

## 2022-10-06 PROCEDURE — 80048 BASIC METABOLIC PNL TOTAL CA: CPT | Performed by: ORTHOPAEDIC SURGERY

## 2022-10-06 PROCEDURE — 250N000013 HC RX MED GY IP 250 OP 250 PS 637: Performed by: ORTHOPAEDIC SURGERY

## 2022-10-06 PROCEDURE — 83036 HEMOGLOBIN GLYCOSYLATED A1C: CPT | Performed by: PHYSICIAN ASSISTANT

## 2022-10-06 PROCEDURE — 250N000013 HC RX MED GY IP 250 OP 250 PS 637: Performed by: PHYSICIAN ASSISTANT

## 2022-10-06 PROCEDURE — 97530 THERAPEUTIC ACTIVITIES: CPT | Mod: GP | Performed by: PHYSICAL THERAPIST

## 2022-10-06 RX ORDER — AMOXICILLIN 250 MG
1 CAPSULE ORAL 2 TIMES DAILY
Qty: 14 TABLET | Refills: 0 | Status: SHIPPED | OUTPATIENT
Start: 2022-10-06 | End: 2022-10-13

## 2022-10-06 RX ORDER — OXYCODONE HYDROCHLORIDE 5 MG/1
5 TABLET ORAL EVERY 4 HOURS PRN
Qty: 12 TABLET | Refills: 0 | Status: SHIPPED | OUTPATIENT
Start: 2022-10-06 | End: 2024-06-17

## 2022-10-06 RX ORDER — POTASSIUM CHLORIDE 1500 MG/1
20 TABLET, EXTENDED RELEASE ORAL ONCE
Status: COMPLETED | OUTPATIENT
Start: 2022-10-06 | End: 2022-10-06

## 2022-10-06 RX ORDER — OXYCODONE HYDROCHLORIDE 5 MG/1
5 TABLET ORAL EVERY 4 HOURS PRN
Status: DISCONTINUED | OUTPATIENT
Start: 2022-10-06 | End: 2022-10-06 | Stop reason: HOSPADM

## 2022-10-06 RX ADMIN — POLYETHYLENE GLYCOL 3350 17 G: 17 POWDER, FOR SOLUTION ORAL at 08:17

## 2022-10-06 RX ADMIN — SENNOSIDES AND DOCUSATE SODIUM 1 TABLET: 50; 8.6 TABLET ORAL at 08:17

## 2022-10-06 RX ADMIN — ACETAMINOPHEN 975 MG: 325 TABLET, FILM COATED ORAL at 05:25

## 2022-10-06 RX ADMIN — LEVOTHYROXINE SODIUM 125 MCG: 75 TABLET ORAL at 06:13

## 2022-10-06 RX ADMIN — PROPRANOLOL HYDROCHLORIDE 60 MG: 20 TABLET ORAL at 10:26

## 2022-10-06 RX ADMIN — OXYCODONE HYDROCHLORIDE 5 MG: 5 TABLET ORAL at 06:13

## 2022-10-06 RX ADMIN — POTASSIUM CHLORIDE 20 MEQ: 1500 TABLET, EXTENDED RELEASE ORAL at 08:17

## 2022-10-06 ASSESSMENT — ACTIVITIES OF DAILY LIVING (ADL)
ADLS_ACUITY_SCORE: 23
ADLS_ACUITY_SCORE: 24

## 2022-10-06 NOTE — PROGRESS NOTES
[facility]    Medicine Progress Note - Hospitalist Service    Date of Admission:  10/5/2022    Assessment & Plan        Kyrie Wilkes is a 72 year old female who was admitted on 10/5/2022.      Past medical history significant for Lumbar stenosis, HTN, HLP, DM2, Obesity, LATRELL, Gout, RLS, Hypothyroidism, Fatty Liver, Chronic daily headaches who underwent an elective bilateral L4-L5 laminotomy.     Hospitalist consult placed by Dr. Bland to assist with post-op co-medical management following an elective bilateral L4-L5 laminotomy.       Lumbar stenosis s/p bilateral L4-L5 laminotomy  POD #1.   - Orthopedic Surgery is managing.               --Defer analgesic management, DVT prophylaxis, PT/OT.    - HGB 12.9-->10.7  - Encourage utilization of incentive spirometer.      Hypokalemia  Potassium checked pre-op on day of admission and low at 3.2 and received 20 mEq of potassium chloride PO.  K up to 3.3  - Monitor and replace per protocol.    ---Pt encouraged to follow up with PCP within one week for recheck.  She states she will follow up.     HTN  - Resumed on PTA amlodipine 10 mg every evening and propanol 20 mg BID.  Hold parameters in place.     - Hold PTA lisinopril 20 mg every morning.       HLP  - Resumed on PTA atorvastatin 20 mg/d.       DM2  Hyperglycemia  A1c of 6.1% from 6/8/22.    *Patient received intra-op dexamethasone 8 mg, anticipate some degree of hyperglycemia.    - Hold PTA glipizide 5 mg/d and metformin 1000 mg BID.    - Medium intensity sliding scale insulin.    - Glucose checks.    - Hypoglycemic protocol.       Obesity with associated LATRELL  Body mass index is 34.08 kg/m .  Patient isnon-compliant with CPAP.  Increase in all-cause morbidity and mortality.   - Encourage weight loss.  - Follow up with PCP regarding ongoing management.      - Monitor O2 saturations.       Gout  - Resumed on PTA allopurinol 100 mg/d.       RLS  - Resumed on PTA PRN Requip 0.25 mg  "TID.     Hypothyroidism  - Resumed on PTA levothyroxine 125 mcg/d.       Fatty Liver  Continue follow up with PCP.       Chronic daily headaches  - Hold PTA Excedrin migraine and resume at discharge.           Diet: Regular Diet Adult  Diet    DVT Prophylaxis: Defer to primary service  Arrington Catheter: Not present  Central Lines: None  Cardiac Monitoring: None  Code Status: Full Code      Disposition Plan      Expected Discharge Date: 10/06/2022                The patient's care was discussed with the Bedside Nurse and Patient.    Patient was discussed with Dr. Vann of the hospitalist service.  She is in agreement with my assessment and plan of care.    SOLO Chavarria  Hospitalist Service  [facility]  Securely message with the Vocera Web Console (learn more here)  Text page via [employer] Paging/Directory         Clinically Significant Risk Factors Present on Admission                 # Hypertension: home medication list includes antihypertensive(s)    # Obesity: Estimated body mass index is 34.08 kg/m  as calculated from the following:    Height as of this encounter: 1.6 m (5' 3\").    Weight as of this encounter: 87.3 kg (192 lb 6.4 oz).        ______________________________________________________________________    Interval History   Pt doing well. Pain controlled post-op.  Denies f/c, cp, sob, abd pain, n/v.  Tolerating diet and activity.  Discharged per orthopedics.  Reviewed potassium level with patient and recommended close PCP follow up. She is dressed and eager to discharge.      Data reviewed today: I reviewed all medications, new labs and imaging results over the last 24 hours. I personally reviewed no images or EKG's today.    Physical Exam   Vital Signs: Temp: 98.2  F (36.8  C) Temp src: Oral BP: 107/52 Pulse: 80   Resp: 16 SpO2: 94 % O2 Device: None (Room air) Oxygen Delivery: 1 LPM  Weight: 192 lbs 6.4 oz     Constitutional: Alert, oriented to person, place, situation.  " Cooperative, in NAD.   HEENT: Sclera white, MMM  Respiratory: Breathing non-labored  Musculoskeletal: Normal muscle bulk and tone  Neuro: Alert and appropriate, HINSON  Psych: Calm and cooperative    Data   Recent Labs   Lab 10/06/22  0718 10/06/22  0709 10/05/22  2338 10/05/22  2203 10/05/22  1542 10/05/22  0924   HGB  --  10.7*  --   --   --   --    NA  --  136  --   --   --   --    POTASSIUM  --  3.3* 3.8  --   --  3.2*   CHLORIDE  --  100  --   --   --   --    CO2  --  31  --   --   --   --    BUN  --  9  --   --   --   --    CR  --  0.54  --   --   --   --    ANIONGAP  --  5  --   --   --   --    JEN  --  9.1  --   --   --   --    * 143*  --  229*   < > 134*    < > = values in this interval not displayed.     Recent Results (from the past 24 hour(s))   XR Surgery RENY L/T 5 Min Fluoro w Stills    Narrative    SURGERY C-ARM FLUOROSCOPY LESS THAN FIVE MINUTES WITH STILLS   10/5/2022 1:10 PM     COMPARISON: None.    HISTORY: Bilateral L4-L5 laminotomy.    NUMBER OF IMAGES ACQUIRED: 1    VIEWS: Lateral view of the lower lumbar spine/lumbosacral junction.    FLUOROSCOPY TIME: 0.1 minutes.      Impression    IMPRESSION: No intraoperative consultation of a radiologist was  requested for this exam. A single portable crosstable lateral spot  fluoroscopic image of the lower lumbar spine/lumbosacral junction was  performed for localization purposes. Presuming five lumbar-type  vertebral bodies, a cylindrical surgical device is seen projecting  over the posterior elements with anterior tip pointing toward the L4  vertebral body/L4-L5 disc space. Please see the operative report from  spine surgery for additional details and real-time findings.     Medications     lactated ringers Stopped (10/05/22 2214)       acetaminophen  975 mg Oral Q8H CARLTON     allopurinol  100 mg Oral QPM     amLODIPine  10 mg Oral QPM     atorvastatin  20 mg Oral QPM     insulin aspart  1-7 Units Subcutaneous TID AC     insulin aspart  1-5 Units  Subcutaneous At Bedtime     levothyroxine  125 mcg Oral QAM     [Held by provider] lisinopril  20 mg Oral QAM     polyethylene glycol  17 g Oral Daily     potassium chloride  20 mEq Oral Once     propranolol  60 mg Oral BID     senna-docusate  1 tablet Oral BID     sodium chloride (PF)  3 mL Intracatheter Q8H

## 2022-10-06 NOTE — PLAN OF CARE
[facility]      OUTPATIENT PHYSICAL THERAPY EVALUATION  PLAN OF TREATMENT FOR OUTPATIENT REHABILITATION  (COMPLETE FOR INITIAL CLAIMS ONLY)  Patient's Last Name, First Name, M.I.  YOB: 1950  Kyrie Wilkes                        Provider's Name  [facility] Medical Record No.  [id number]                               Onset Date:  10/05/22   Start of Care Date:    10/6/2022     Type:     _X_PT   ___OT   ___SLP Medical Diagnosis:   s/p lumbar lamis                        PT Diagnosis:  Impaired indep w/fn mob   Visits from SOC:  1   _________________________________________________________________________________  Plan of Treatment/Functional Goals    Planned Interventions: balance training, bed mobility training, gait training, home exercise program, transfer training     Goals: See Physical Therapy Goals on Care Plan in [facility] electronic health record.    Therapy Frequency: One time eval and treatment only  Predicted Duration of Therapy Intervention: 10/06/22  _________________________________________________________________________________    I CERTIFY THE NEED FOR THESE SERVICES FURNISHED UNDER        THIS PLAN OF TREATMENT AND WHILE UNDER MY CARE     (Physician co-signature of this document indicates review and certification of the therapy plan).               ,      Referring Physician: Brian Bland MD            Initial Assessment        See Physical Therapy evaluation dated   in Epic electronic health record.

## 2022-10-06 NOTE — PLAN OF CARE
A&Ox4. VSS on 2L NC. Pain 1/10. Cold applied to back on midline incisional site, CDI. Denies N/V, tolerating reg diet. . BGM AC, HS, 2am. Ax1/G/W, Voiding via br. BS active, not passing flatus yet. Discharge pending improvement.

## 2022-10-06 NOTE — DISCHARGE SUMMARY
ORTHOPAEDIC DISCHARGE SUMMARY     Date of Admission: 10/5/2022  Date of Discharge: 10/6/2022  Disposition: Home  Surgeon: Brian Bland MD      DISCHARGE DIAGNOSIS:  Spondylolisthesis of lumbar region [M43.16]    PROCEDURES: Procedure(s):  bilateral lumbar 4 to lumbar 5 laminotomy on 10/5/2022    BRIEF HISTORY:  This was a planned admission after the above elective procedure.    HOSPITAL COURSE:    Surgery was uncomplicated. Kyrie Wilkes has done well post-operatively. Medicine was consulted post operatively to aid in management of medical comorbidities. See final recommendations below.     The patient received routine nursing cares and is medically stable. Vital signs are stable. The patient is tolerating a regular diet without GI distress/nausea or vomiting. Voiding spontaneously. All PT/OT goals have been met for safe mobility. Pain is now controlled on oral medications which will be available on discharge. Stool softeners have been used while taking pain medications to help prevent constipation. Kyrie Wilkes is deemed medically safe to discharge.     Antibiotics:  Ancef given periop  PT Progress:  Has met PT/OT goals for safe mobility.   Pain Meds:  Weaned off all IV pain meds by discharge.  Inpatient Events: No significant events or complications.     Discharge orders and instructions as below.    FOLLOWUP:    Future Appointments   Date Time Provider Department Center   10/6/2022  8:15 AM Chasidy Grijalva, PT [facility]         PLANNED DISCHARGE ORDERS:     DVT Prophylaxis: Mobilization        Current Discharge Medication List      START taking these medications    Details   oxyCODONE (ROXICODONE) 5 MG tablet Take 1 tablet (5 mg) by mouth every 4 hours as needed for moderate to severe pain  Qty: 12 tablet, Refills: 0    Associated Diagnoses: Spinal stenosis of lumbar region with neurogenic claudication      senna-docusate (SENOKOT-S/PERICOLACE) 8.6-50 MG tablet Take 1 tablet by mouth 2 times daily for 7  days  Qty: 14 tablet, Refills: 0    Associated Diagnoses: Spinal stenosis of lumbar region with neurogenic claudication         CONTINUE these medications which have NOT CHANGED    Details   allopurinol (ZYLOPRIM) 100 MG tablet Take 100 mg by mouth every evening      amLODIPine (NORVASC) 10 MG tablet Take 10 mg by mouth every evening      aspirin-acetaminophen-caffeine (EXCEDRIN MIGRAINE) 250-250-65 MG tablet Take 3 tablets by mouth 2 times daily      atorvastatin (LIPITOR) 20 MG tablet Take 20 mg by mouth every evening      glipiZIDE (GLUCOTROL XL) 5 MG 24 hr tablet Take 5 mg by mouth daily      glucosamine-chondroitin 500-400 MG tablet Take 1 tablet by mouth 2 times daily      levothyroxine (SYNTHROID/LEVOTHROID) 125 MCG tablet Take 125 mcg by mouth every morning      lisinopril (ZESTRIL) 20 MG tablet Take 20 mg by mouth every morning      metFORMIN (GLUCOPHAGE) 500 MG tablet Take 1,000 mg by mouth 2 times daily (500MG X 2 = 1,000MG)      multivitamin (CENTRUM SILVER) tablet Take 1 tablet by mouth every evening      propranolol (INDERAL) 60 MG tablet Take 60 mg by mouth 2 times daily      rOPINIRole (REQUIP) 0.25 MG tablet Take 0.25 mg by mouth 3 times daily as needed      vitamin B-12 (CYANOCOBALAMIN) 500 MCG tablet Take 500 mcg by mouth every evening      vitamin B6 (PYRIDOXINE) 100 MG tablet Take 100 mg by mouth every evening      Vitamin D3 (CHOLECALCIFEROL) 25 mcg (1000 units) tablet Take 1-2 tablets by mouth 2 times daily               Discharge Procedure Orders   Reason for your hospital stay   Order Comments: L4-5 laminotomies     Follow-up and recommended labs and tests   Order Comments: Follow up with Dr Bland 2 weeks after surgery     Activity   Order Comments: Your activity upon discharge: Weight bear as tolerated     Order Specific Question Answer Comments   Is discharge order? Yes      Discharge Instructions   Order Comments: Care after Spine Surgery - Dr. Brian Bland    The following information will  help you through your recovery at home.    Pain  - It is normal for you to experience some pain in the area of your incision after your surgery.  Some of your leg pain may go away immediately after your surgery.  Some of the pain will gradually decrease over the next few days or weeks depending on the amount of inflammation present in the nerve.      - Sometimes Dr. Bland will place a steroid preparation on the nerve during surgery.   This may help decrease the nerve inflammation for the first few days after surgery.  If some of your leg pain returns 3 to 5 days after surgery it may be due to the steroid wearing off.  The pain should not be as bad as your pre-op pain and will diminish over a period of weeks.      - You should call Dr. Bland's office if your leg pain returns suddenly and does not improve over 24 hours.      Home Medications  - If you take a blood thinner (e.g. aspirin, warfarin, Xeralto, Eliquis, etc...) at baseline, wait until two days after the operation to start taking it again.  This is to lower the risk of a blood collection pushing on the nerves in the surgery site.  If after starting your blood thinner again, if you notice severe worsening back and leg pain, contact Dr Bland's office immediately, as this could represent a blood collection forming.    Activity  - You may increase your activity as tolerated; walking is the best form of exercise after spine surgery.    - For six weeks after surgery avoid bending, lifting, and twisting (BLT).  Avoid activities such as vacuuming, raking and shoveling.   - Try to limit your lifting to 10 lbs during the first 2 weeks and then increase to 20-25lbs over the next 6 weeks.  - You may return to work approximately 1- 2 weeks after your surgery if you have a sedentary or desk type job.  If you have a physical job Dr. Bland and his staff will help you determine a return to work plan.    - You may resume sexual activity when you feel ready.  Stop if you have  pain.    Driving  - You may drive if you feel strong enough and are not taking any narcotic pain medications.    Incision Site   - It is ok to shower starting the day after surgery.  You may allow the dressing to get wet.  Do not immerse the incision in water such as (bath, pool, hot tub) for at least 3 weeks.  Do not scrub the incision site while in the shower.    - Your incision is covered with steri-strips (narrow white tapes); they will get loose and fall off in 10-20 days.  If they are still in place 3 weeks after surgery, you may remove them.        Pain Management   - Take your prescribed pain medication as needed and directed.  Use Tylenol for your discomfort when you no longer need the narcotic pain medication.   If you have had a fusion, do not use ibuprofen or other NSAIDs unless you talk to Dr Bland about it first.    - If you need a refill on your pain medication call [phone], please allow 24 hours for your prescription to be refilled, Dr. Bland's office does not refill pain medications on Friday afternoons.    Diet   - Eat a healthy diet; this will help your recovery.  - Drink plenty of fluids, water, milk or juice.  - If you have trouble with constipation you should eat more fiber, drink more fluids, increase your walking or try an over the counter laxative.    Follow-up Visits  - You should have a post-op appointment that was scheduled for you at the same time your surgery was scheduled. If you do not, please call Dr. Bland's office when you get home from your surgery.  - Write down any questions you have about your surgery, recovery, return to work and other topics you wished to be covered at your post-op visit.  This way, we will be able to address all of your questions at your next visit.  - Call Dr. Bland's office if you have any questions or concerns.    When to Call your Doctor:  - If you have any redness, warmth or swelling at the incision site.  - If your incision opens up.  - If you have  increasing drainage from your incision.  - If you have a temperature greater than 100.5 degrees Fahrenheit.     Diet   Order Comments: Follow this diet upon discharge: Normal diet     Order Specific Question Answer Comments   Is discharge order? Yes            Brian Bland MD  Orthopaedic Spine Surgery  [facility]

## 2022-10-06 NOTE — PROGRESS NOTES
10/06/22 0720   Quick Adds   Type of Visit Initial PT Evaluation       Present no   Language English   Living Environment   People in Home spouse   Current Living Arrangements apartment   Home Accessibility no concerns   Transportation Anticipated family or friend will provide   Living Environment Comments Pt moved 1mo ago to apartment w/no stairs. Lives w/spouse who has bone cancer, does not need to physically assist him but provides supervision. Reports her family lives close by and assists as needed.   Self-Care   Usual Activity Tolerance moderate   Current Activity Tolerance moderate   Regular Exercise No   Equipment Currently Used at Home walker, rolling;cane, straight   Fall history within last six months no   General Information   Onset of Illness/Injury or Date of Surgery 10/05/22   Referring Physician Brian Bland MD   Patient/Family Therapy Goals Statement (PT) Feel better, go home   Pertinent History of Current Problem (include personal factors and/or comorbidities that impact the POC) Pt is s/p  s/p L4-5 laminotomies.   Existing Precautions/Restrictions spinal   Cognition   Affect/Mental Status (Cognition) WNL   Orientation Status (Cognition) oriented x 4   Follows Commands (Cognition) WNL   Pain Assessment   Patient Currently in Pain Yes, see Vital Sign flowsheet  (4/10)   Integumentary/Edema   Integumentary/Edema no deficits were identifed   Posture    Posture Forward head position;Protracted shoulders;Kyphosis   Range of Motion (ROM)   Range of Motion ROM is WFL;ROM deficits secondary to pain;ROM deficits secondary to surgical procedure   Strength (Manual Muscle Testing)   Strength (Manual Muscle Testing) strength is WFL   Strength Comments Grossly 4+/5 B LEs   Bed Mobility   Bed Mobility supine-sit   Comment, (Bed Mobility) SBA   Transfers   Comment, (Transfers) SBA   Gait/Stairs (Locomotion)   Distance in Feet (Required for LE Total Joints) 50'+ 300'   Comment, (Gait/Stairs)  W/no AD CG-Evon, w/2WW SBA   Balance   Balance Comments Benefits from B UE support   Sensory Examination   Sensory Perception patient reports no sensory changes   Coordination   Coordination no deficits were identified   Muscle Tone   Muscle Tone no deficits were identified   Clinical Impression   Criteria for Skilled Therapeutic Intervention Yes, treatment indicated   PT Diagnosis (PT) Impaired indep w/fn mob   Influenced by the following impairments Strength, balance, precautions   Functional limitations due to impairments Bed mob, transfers, amb   Clinical Presentation (PT Evaluation Complexity) Stable/Uncomplicated   Clinical Presentation Rationale Few affecting comorbidities, assessment incl strength, balance, fn mob.   Clinical Decision Making (Complexity) low complexity   Planned Therapy Interventions (PT) balance training;bed mobility training;gait training;home exercise program;transfer training   Risk & Benefits of therapy have been explained evaluation/treatment results reviewed;care plan/treatment goals reviewed;participants included;patient;participants voiced agreement with care plan;current/potential barriers reviewed;risks/benefits reviewed   PT Discharge Planning   PT Discharge Recommendation (DC Rec) home with outpatient physical therapy;home;home with assist  (Per Ortho MD recs)   PT Rationale for DC Rec Pt should do well with post-operative cares,  required no physical assistance to complete transfers and abmulation by end of session with use of 2WW, was able to ambulate 300ft w/SBA. Anticipate pt would benefit from OPPT after 2 week follow-up in order to maximize fn outcomes and reduce risk of re-hospitalization.    PT Brief overview of current status SBA w/2WW   Plan of Care Review   Plan of Care Reviewed With patient   Total Evaluation Time   Total Evaluation Time (Minutes) 10   Physical Therapy Goals   PT Frequency One time eval and treatment only   PT Predicted Duration/Target Date for Goal  Attainment 10/06/22   PT Goals Bed Mobility;Transfers;Gait   PT: Bed Mobility Supervision/stand-by assist   PT: Transfers Supervision/stand-by assist   PT: Gait Supervision/stand-by assist   Psychosocial Support   Trust Relationship/Rapport care explained;choices provided;questions encouraged;reassurance provided;empathic listening provided;questions answered

## 2022-10-06 NOTE — PROGRESS NOTES
"Ortho Progress Note     Subjective:  No acute overnight events. Doing well, resolution of preop lower extremity pain.  Very pleased.      Objective:  /56 (BP Location: Right arm, Patient Position: Semi-Craig's)   Pulse 73   Temp 98.1  F (36.7  C) (Oral)   Resp 15   Ht 1.6 m (5' 3\")   Wt 87.3 kg (192 lb 6.4 oz)   SpO2 97%   BMI 34.08 kg/m    Gen: A&Ox3, no acute distress  CV: 2+ dp/pt pulses, capillary refill < 2sec  Resp: breathing equal and non-labored, no wheezing  MSK:       Spine:   Skin: intact over lumbar spine without evidence of infection    Sensation:      R       L    L3:   Intact   Intact    L4:   Intact   Intact    L5:   Intact   Intact    S1:   Intact   Intact     Motor:     R L    L3: Psoas    5  5    L4:   Quad    5  5    L4: TA   5 5    L5: EHL    5  5    S1: Eversion/PF  5  5        Hemoglobin   Date Value Ref Range Status   11/18/2020 12.9 11.7 - 15.7 g/dL Final   11/17/2020 12.6 11.7 - 15.7 g/dL Final       Assessment/Plan:  POD 1 s/p L4-5 laminotomies.  Recovering as expected.    -Activity:                                       Up with assist  -Weight Bearing Status:              WBAT   -Bracing:                                      None  -Pain control:                               Rx for oxycodone  -Dressing:                                     Ok to shower with dressing in place, dressing ok to get wet.  -Diet:                                              ADAT     -Disposition:                                 Home today  -Follow up:                                   2 weeks in my clinic     Brian Bland MD  Orthopedic Spine Surgery  [facility]   "

## 2022-10-06 NOTE — PROGRESS NOTES
Patient vital signs are at baseline: Yes  Patient able to ambulate as they were prior to admission or with assist devices provided by therapies during their stay:  Yes  Patient MUST void prior to discharge:  Yes  Patient able to tolerate oral intake:  Yes  Pain has adequate pain control using Oral analgesics:  Yes  Does patient have an identified :  Yes  Has goal D/C date and time been discussed with patient:  Yes    Pt is A/O x4. VSS on 1L. SBA. Pain managed with scheduled tylenol, PRN oxy and ice. Dressing CDI. Denies N/V. SBA. Insulin given per sliding scale. Continent B/B, ambulated to BR. Regular diet with carb counting. Potassium protocol. CMS intact. IV SL.

## 2022-10-06 NOTE — PLAN OF CARE
Physical Therapy Discharge Summary    Reason for therapy discharge:    Discharged to home with outpatient therapy.    Progress towards therapy goal(s). See goals on Care Plan in [facility] electronic health record for goal details.  Goals met    Therapy recommendation(s):    Pt should do well with post-operative cares,  required no physical assistance to complete transfers and abmulation by end of session with use of 2WW, was able to ambulate 300ft w/SBA. Anticipate pt would benefit from OPPT after 2 week follow-up in order to maximize fn outcomes and reduce risk of re-hospitalization.

## 2022-10-06 NOTE — PROVIDER NOTIFICATION
Patient vital signs are at baseline: Yes  Patient able to ambulate as they were prior to admission or with assist devices provided by therapies during their stay:  Yes  Patient MUST void prior to discharge:  Yes  Patient able to tolerate oral intake:  Yes  Pain has adequate pain control using Oral analgesics:  Yes  Does patient have an identified :  Yes  Has goal D/C date and time been discussed with patient:  Yes    Pt A&O x4, up x SBA with walker. VSS.RA. Pain managed with Oxy. Ambulated to BR and hallway. Adequate I&O. Denied n/v. CMS intact. Back incision site dry and intact. AVS and med reviewed with pt. All questions answered. Pt discharge home with family transport at 1100.

## 2022-10-06 NOTE — PROVIDER NOTIFICATION
Notified hospitalist that pt sepsis warning fired, following protocol.     Hospitalist not concerned  Lactic acid was 0.7, VSS on 1L.

## 2022-10-22 ENCOUNTER — HEALTH MAINTENANCE LETTER (OUTPATIENT)
Age: 72
End: 2022-10-22

## 2023-04-01 ENCOUNTER — HEALTH MAINTENANCE LETTER (OUTPATIENT)
Age: 73
End: 2023-04-01

## 2023-06-01 ENCOUNTER — HEALTH MAINTENANCE LETTER (OUTPATIENT)
Age: 73
End: 2023-06-01

## 2023-08-27 ENCOUNTER — HEALTH MAINTENANCE LETTER (OUTPATIENT)
Age: 73
End: 2023-08-27

## 2024-01-14 ENCOUNTER — HEALTH MAINTENANCE LETTER (OUTPATIENT)
Age: 74
End: 2024-01-14

## 2024-06-02 ENCOUNTER — HEALTH MAINTENANCE LETTER (OUTPATIENT)
Age: 74
End: 2024-06-02

## 2024-06-09 ENCOUNTER — APPOINTMENT (OUTPATIENT)
Dept: ULTRASOUND IMAGING | Facility: CLINIC | Age: 74
End: 2024-06-09
Attending: EMERGENCY MEDICINE
Payer: MEDICARE

## 2024-06-09 ENCOUNTER — HOSPITAL ENCOUNTER (EMERGENCY)
Facility: CLINIC | Age: 74
Discharge: HOME | End: 2024-06-09
Attending: EMERGENCY MEDICINE | Admitting: EMERGENCY MEDICINE
Payer: MEDICARE

## 2024-06-09 VITALS
WEIGHT: 211.2 LBS | DIASTOLIC BLOOD PRESSURE: 81 MMHG | BODY MASS INDEX: 38.87 KG/M2 | HEIGHT: 62 IN | OXYGEN SATURATION: 96 % | HEART RATE: 73 BPM | SYSTOLIC BLOOD PRESSURE: 140 MMHG | RESPIRATION RATE: 17 BRPM | TEMPERATURE: 97 F

## 2024-06-09 DIAGNOSIS — M54.41 ACUTE BILATERAL LOW BACK PAIN WITH RIGHT-SIDED SCIATICA: ICD-10-CM

## 2024-06-09 PROCEDURE — 99284 EMERGENCY DEPT VISIT MOD MDM: CPT | Mod: 25

## 2024-06-09 PROCEDURE — 93971 EXTREMITY STUDY: CPT | Mod: RT

## 2024-06-09 RX ORDER — METHYLPREDNISOLONE 4 MG
TABLET, DOSE PACK ORAL
Qty: 21 TABLET | Refills: 0 | Status: SHIPPED | OUTPATIENT
Start: 2024-06-09 | End: 2024-06-17

## 2024-06-09 ASSESSMENT — COLUMBIA-SUICIDE SEVERITY RATING SCALE - C-SSRS
2. HAVE YOU ACTUALLY HAD ANY THOUGHTS OF KILLING YOURSELF IN THE PAST MONTH?: NO
6. HAVE YOU EVER DONE ANYTHING, STARTED TO DO ANYTHING, OR PREPARED TO DO ANYTHING TO END YOUR LIFE?: NO
1. IN THE PAST MONTH, HAVE YOU WISHED YOU WERE DEAD OR WISHED YOU COULD GO TO SLEEP AND NOT WAKE UP?: NO

## 2024-06-09 ASSESSMENT — ACTIVITIES OF DAILY LIVING (ADL)
ADLS_ACUITY_SCORE: 36
ADLS_ACUITY_SCORE: 38
ADLS_ACUITY_SCORE: 38

## 2024-06-09 NOTE — ED PROVIDER NOTES
Emergency Department Note      History of Present Illness     Chief Complaint  Back Pain and Leg Pain    HPI  Kyrie Wilkes is a 74 year old female with history of type 2 diabetes and hypertension who presents to the ED for evaluation of back pain and leg pain. The patient reports that she has pain in her right leg since 6/5, she states that today she developed stabbing pain in her right leg today. She notes that this pain is similar to pain she had in 2022 with sciatica. She went to  where they told her to come here to rule out DVT. She reports numbness/tingling, and pain which goes behind her knee down to her ankle which causes her leg to almost drag when walking. The numbness is present in the lateral right thigh. She denies swelling in the leg, blood thinner use, medication use, and a family history of stroke.    Independent Historian  None    Review of External Notes  I reviewed UC from today.  Past Medical History   Medical History and Problem List  Claustrophobia   High cholesterol   Hypertension   Restless leg syndrome   Sleep apnea   Thyroid disease  Hypokalemia   Respiratory failure with hypoxia   Fatty liver disease   Gout   Obesity   Type two diabetes  Hydrops of gallbladder  Lymphadenopathy   Pseudophakia     Medications  Zyloprim   Norvasc   Lipitor   Glucotrol   Glucosamine-chondroitin   Synthroid   Lisinopril   Oxycodone   Inderal   Requip   Albuterol     Surgical History   Past Surgical History:   Procedure Laterality Date    CATARACT IOL, RT/LT      CHOLECYSTECTOMY      HYSTERECTOMY, KIMBERLY      LAMINECTOMY LUMBAR ONE LEVEL Bilateral 10/5/2022    Procedure: bilateral lumbar 4 to lumbar 5 laminotomy;  Surgeon: Brian Bland MD;  Location: [facility] OR    OPEN REDUCTION INTERNAL FIXATION ANKLE      SALPINGO-OOPHORECTOMY BILATERAL      TUBAL LIGATION       Physical Exam   Patient Vitals for the past 24 hrs:   BP Temp Temp src Pulse Resp SpO2 Height Weight   06/09/24 0939 (!) 140/81 97  F (36.1  C) Temporal 73  "17 96 % 1.575 m (5' 2\") 95.8 kg (211 lb 3.2 oz)     Physical Exam  Gen:  Pleasant, appears stated age.    Eye:   Sclera non-injected.      Musculoskeletal:     Normal movement of all extremities without evidence for deficit.    Extremities:    Bilateral lower extremities appear symmetric, no obvious asymmetric swelling or discoloration of the right leg compared to the left.  Right calf is nontender.  Full range of motion in the ankle without pain.  No cords palpated.    Skin:   Warm and dry.  No rash.    Neurologic:    Positive leg raise on the the right.  5 out of 5 dorsiflexion, plantarflexion, knee extension, knee flexion, hip flexion.  Fine to sensation grossly intact in bilateral lower extremities in all dermatomes.  Hyporeflexic bilaterally.     Fluent speech.    Psychiatric:     Normal affect with appropriate interaction with examiner.    Diagnostics   Lab Results   Labs Ordered and Resulted from Time of ED Arrival to Time of ED Departure - No data to display    Imaging  US Lower Extremity Venous Duplex Right   Final Result   IMPRESSION:   1.  No deep venous thrombosis in the right lower extremity.          EKG   none    Independent Interpretation  None  ED Course    Medications Administered  Medications - No data to display    Procedures  Procedures     Discussion of Management  None    Social Determinants of Health adding to complexity of care  None    ED Course  ED Course as of 06/09/24 1059   Sun Jun 09, 2024   1059 I obtained history and examined the patient as noted above.      Medical Decision Making / Diagnosis   CMS Diagnoses: None    MIPS     None    CASA BUENO Chung is a 74 year old female with history of type 2 diabetes, sciatica, presenting today with right leg pain.  Symptoms are classic for sciatica.  Patient was sent over for further evaluation, given isolated pain to the right lower extremity.  Fortunately, DVT ultrasound is negative.  Otherwise, no concerns at this point for arterial " ischemia, compartment syndrome, septic arthritis, cellulitis, or alternative cause of symptoms.  She is ambulatory with her walker per baseline.  We will start Medrol Dosepak.  Follow-up with primary care doctor to assess progress, patient understands it will take several weeks for her symptoms to resolve.  Return to the ED right away for increasing pain, new numbness or weakness, fevers or chills, or for other concerns    Disposition  The patient was discharged.     ICD-10 Codes:    ICD-10-CM    1. Acute bilateral low back pain with right-sided sciatica  M54.41            Discharge Medications  New Prescriptions    METHYLPREDNISOLONE (MEDROL DOSEPAK) 4 MG TABLET THERAPY PACK    Follow Package Directions       MD MADDISON Arellano Elizabeth Mazzara, ariel serving as a scribe at 10:59 AM on 6/9/2024 to document services personally performed by Keyla Maradiaga MD based on my observations and the provider's statements to me.      Keyla Maradiaga MD  06/10/24 1041

## 2024-06-09 NOTE — ED TRIAGE NOTES
"Pt has been having low back pain that goes into her right leg.  She has been driving between here and [place] trying to help her brother who just had a stroke.  She states that the pain started on Wednesday but this morning she started feeling \"electrical shocks\".  She went to  for some pain medications and was sent to ED for possible DVT.       Triage Assessment (Adult)       Row Name 06/09/24 0937          Triage Assessment    Airway WDL WDL        Respiratory WDL    Respiratory WDL WDL        Cardiac WDL    Cardiac WDL WDL                     "

## 2024-06-17 ENCOUNTER — HOSPITAL ENCOUNTER (OUTPATIENT)
Facility: CLINIC | Age: 74
Setting detail: OBSERVATION
Discharge: NURSING, REHAB OR LONG-TERM CARE | End: 2024-06-20
Attending: STUDENT IN AN ORGANIZED HEALTH CARE EDUCATION/TRAINING PROGRAM
Payer: MEDICARE

## 2024-06-17 ENCOUNTER — APPOINTMENT (OUTPATIENT)
Dept: MRI IMAGING | Facility: CLINIC | Age: 74
End: 2024-06-17
Attending: STUDENT IN AN ORGANIZED HEALTH CARE EDUCATION/TRAINING PROGRAM
Payer: MEDICARE

## 2024-06-17 DIAGNOSIS — E03.9 HYPOTHYROIDISM, UNSPECIFIED TYPE: ICD-10-CM

## 2024-06-17 DIAGNOSIS — G43.809 OTHER MIGRAINE WITHOUT STATUS MIGRAINOSUS, NOT INTRACTABLE: ICD-10-CM

## 2024-06-17 DIAGNOSIS — E53.8 VITAMIN B12 DEFICIENCY (NON ANEMIC): ICD-10-CM

## 2024-06-17 DIAGNOSIS — M81.0 OSTEOPOROSIS, UNSPECIFIED OSTEOPOROSIS TYPE, UNSPECIFIED PATHOLOGICAL FRACTURE PRESENCE: ICD-10-CM

## 2024-06-17 DIAGNOSIS — E11.29 TYPE 2 DIABETES MELLITUS WITH MICROALBUMINURIA, WITHOUT LONG-TERM CURRENT USE OF INSULIN (H): Primary | ICD-10-CM

## 2024-06-17 DIAGNOSIS — M1A.9XX0 CHRONIC GOUT WITHOUT TOPHUS, UNSPECIFIED CAUSE, UNSPECIFIED SITE: ICD-10-CM

## 2024-06-17 DIAGNOSIS — S32.030A COMPRESSION FRACTURE OF L3 VERTEBRA, INITIAL ENCOUNTER (H): ICD-10-CM

## 2024-06-17 DIAGNOSIS — J45.909 UNCOMPLICATED ASTHMA, UNSPECIFIED ASTHMA SEVERITY, UNSPECIFIED WHETHER PERSISTENT: ICD-10-CM

## 2024-06-17 DIAGNOSIS — E78.5 HYPERLIPIDEMIA LDL GOAL <100: ICD-10-CM

## 2024-06-17 DIAGNOSIS — E63.9 NUTRITIONAL DEFICIENCY: ICD-10-CM

## 2024-06-17 DIAGNOSIS — I10 PRIMARY HYPERTENSION: ICD-10-CM

## 2024-06-17 DIAGNOSIS — M54.42 CHRONIC BILATERAL LOW BACK PAIN WITH BILATERAL SCIATICA: ICD-10-CM

## 2024-06-17 DIAGNOSIS — M43.16 SPONDYLOLISTHESIS OF LUMBAR REGION: ICD-10-CM

## 2024-06-17 DIAGNOSIS — E87.6 HYPOKALEMIA: ICD-10-CM

## 2024-06-17 DIAGNOSIS — M54.41 CHRONIC BILATERAL LOW BACK PAIN WITH BILATERAL SCIATICA: ICD-10-CM

## 2024-06-17 DIAGNOSIS — R80.9 TYPE 2 DIABETES MELLITUS WITH MICROALBUMINURIA, WITHOUT LONG-TERM CURRENT USE OF INSULIN (H): Primary | ICD-10-CM

## 2024-06-17 DIAGNOSIS — G89.29 CHRONIC BILATERAL LOW BACK PAIN WITH BILATERAL SCIATICA: ICD-10-CM

## 2024-06-17 DIAGNOSIS — G25.81 RESTLESS LEG SYNDROME: ICD-10-CM

## 2024-06-17 LAB
ANION GAP SERPL CALCULATED.3IONS-SCNC: 14 MMOL/L (ref 7–15)
BASOPHILS # BLD AUTO: 0.1 10E3/UL (ref 0–0.2)
BASOPHILS NFR BLD AUTO: 0 %
BUN SERPL-MCNC: 21.6 MG/DL (ref 8–23)
CALCIUM SERPL-MCNC: 7.8 MG/DL (ref 8.8–10.2)
CALCIUM SERPL-MCNC: 8.8 MG/DL (ref 8.8–10.2)
CHLORIDE SERPL-SCNC: 105 MMOL/L (ref 98–107)
CREAT SERPL-MCNC: 0.58 MG/DL (ref 0.51–0.95)
DEPRECATED HCO3 PLAS-SCNC: 25 MMOL/L (ref 22–29)
EGFRCR SERPLBLD CKD-EPI 2021: >90 ML/MIN/1.73M2
EOSINOPHIL # BLD AUTO: 0.1 10E3/UL (ref 0–0.7)
EOSINOPHIL NFR BLD AUTO: 0 %
ERYTHROCYTE [DISTWIDTH] IN BLOOD BY AUTOMATED COUNT: 14.1 % (ref 10–15)
GLUCOSE BLDC GLUCOMTR-MCNC: 264 MG/DL (ref 70–99)
GLUCOSE SERPL-MCNC: 130 MG/DL (ref 70–99)
HBA1C MFR BLD: 6.4 %
HCT VFR BLD AUTO: 42.1 % (ref 35–47)
HGB BLD-MCNC: 14.1 G/DL (ref 11.7–15.7)
IMM GRANULOCYTES # BLD: 0.1 10E3/UL
IMM GRANULOCYTES NFR BLD: 1 %
LYMPHOCYTES # BLD AUTO: 1.8 10E3/UL (ref 0.8–5.3)
LYMPHOCYTES NFR BLD AUTO: 13 %
MAGNESIUM SERPL-MCNC: 1.7 MG/DL (ref 1.7–2.3)
MCH RBC QN AUTO: 29.9 PG (ref 26.5–33)
MCHC RBC AUTO-ENTMCNC: 33.5 G/DL (ref 31.5–36.5)
MCV RBC AUTO: 89 FL (ref 78–100)
MONOCYTES # BLD AUTO: 1 10E3/UL (ref 0–1.3)
MONOCYTES NFR BLD AUTO: 8 %
NEUTROPHILS # BLD AUTO: 10.2 10E3/UL (ref 1.6–8.3)
NEUTROPHILS NFR BLD AUTO: 77 %
NRBC # BLD AUTO: 0 10E3/UL
NRBC BLD AUTO-RTO: 0 /100
PLATELET # BLD AUTO: 241 10E3/UL (ref 150–450)
POTASSIUM SERPL-SCNC: 2.3 MMOL/L (ref 3.4–5.3)
POTASSIUM SERPL-SCNC: 3.5 MMOL/L (ref 3.4–5.3)
RBC # BLD AUTO: 4.71 10E6/UL (ref 3.8–5.2)
SODIUM SERPL-SCNC: 144 MMOL/L (ref 135–145)
WBC # BLD AUTO: 13.2 10E3/UL (ref 4–11)

## 2024-06-17 PROCEDURE — 96361 HYDRATE IV INFUSION ADD-ON: CPT

## 2024-06-17 PROCEDURE — 250N000011 HC RX IP 250 OP 636: Mod: JZ | Performed by: STUDENT IN AN ORGANIZED HEALTH CARE EDUCATION/TRAINING PROGRAM

## 2024-06-17 PROCEDURE — 99285 EMERGENCY DEPT VISIT HI MDM: CPT | Mod: 25

## 2024-06-17 PROCEDURE — 85025 COMPLETE CBC W/AUTO DIFF WBC: CPT | Performed by: BEHAVIOR TECHNICIAN

## 2024-06-17 PROCEDURE — 250N000013 HC RX MED GY IP 250 OP 250 PS 637: Performed by: STUDENT IN AN ORGANIZED HEALTH CARE EDUCATION/TRAINING PROGRAM

## 2024-06-17 PROCEDURE — 96374 THER/PROPH/DIAG INJ IV PUSH: CPT

## 2024-06-17 PROCEDURE — 83735 ASSAY OF MAGNESIUM: CPT | Performed by: STUDENT IN AN ORGANIZED HEALTH CARE EDUCATION/TRAINING PROGRAM

## 2024-06-17 PROCEDURE — 82310 ASSAY OF CALCIUM: CPT | Mod: 91 | Performed by: STUDENT IN AN ORGANIZED HEALTH CARE EDUCATION/TRAINING PROGRAM

## 2024-06-17 PROCEDURE — 72158 MRI LUMBAR SPINE W/O & W/DYE: CPT | Mod: MA

## 2024-06-17 PROCEDURE — A9585 GADOBUTROL INJECTION: HCPCS | Performed by: STUDENT IN AN ORGANIZED HEALTH CARE EDUCATION/TRAINING PROGRAM

## 2024-06-17 PROCEDURE — 99223 1ST HOSP IP/OBS HIGH 75: CPT | Mod: AI | Performed by: STUDENT IN AN ORGANIZED HEALTH CARE EDUCATION/TRAINING PROGRAM

## 2024-06-17 PROCEDURE — 36415 COLL VENOUS BLD VENIPUNCTURE: CPT | Performed by: BEHAVIOR TECHNICIAN

## 2024-06-17 PROCEDURE — 258N000003 HC RX IP 258 OP 636: Mod: JZ | Performed by: STUDENT IN AN ORGANIZED HEALTH CARE EDUCATION/TRAINING PROGRAM

## 2024-06-17 PROCEDURE — 80048 BASIC METABOLIC PNL TOTAL CA: CPT | Performed by: BEHAVIOR TECHNICIAN

## 2024-06-17 PROCEDURE — 82962 GLUCOSE BLOOD TEST: CPT

## 2024-06-17 PROCEDURE — 84132 ASSAY OF SERUM POTASSIUM: CPT | Performed by: BEHAVIOR TECHNICIAN

## 2024-06-17 PROCEDURE — 83036 HEMOGLOBIN GLYCOSYLATED A1C: CPT | Performed by: STUDENT IN AN ORGANIZED HEALTH CARE EDUCATION/TRAINING PROGRAM

## 2024-06-17 PROCEDURE — G0378 HOSPITAL OBSERVATION PER HR: HCPCS

## 2024-06-17 PROCEDURE — 255N000002 HC RX 255 OP 636: Performed by: STUDENT IN AN ORGANIZED HEALTH CARE EDUCATION/TRAINING PROGRAM

## 2024-06-17 RX ORDER — DEXTROSE MONOHYDRATE 25 G/50ML
25-50 INJECTION, SOLUTION INTRAVENOUS
Status: DISCONTINUED | OUTPATIENT
Start: 2024-06-17 | End: 2024-06-20 | Stop reason: HOSPADM

## 2024-06-17 RX ORDER — LISINOPRIL 20 MG/1
20 TABLET ORAL EVERY MORNING
Status: DISCONTINUED | OUTPATIENT
Start: 2024-06-18 | End: 2024-06-20 | Stop reason: HOSPADM

## 2024-06-17 RX ORDER — PROCHLORPERAZINE MALEATE 5 MG
5 TABLET ORAL EVERY 6 HOURS PRN
Status: DISCONTINUED | OUTPATIENT
Start: 2024-06-17 | End: 2024-06-20 | Stop reason: HOSPADM

## 2024-06-17 RX ORDER — GABAPENTIN 300 MG/1
300 CAPSULE ORAL ONCE
Status: COMPLETED | OUTPATIENT
Start: 2024-06-17 | End: 2024-06-17

## 2024-06-17 RX ORDER — POTASSIUM CHLORIDE 1.5 G/1.58G
40 POWDER, FOR SOLUTION ORAL ONCE
Status: DISCONTINUED | OUTPATIENT
Start: 2024-06-17 | End: 2024-06-17

## 2024-06-17 RX ORDER — ACETAMINOPHEN 500 MG
1000 TABLET ORAL ONCE
Status: COMPLETED | OUTPATIENT
Start: 2024-06-17 | End: 2024-06-17

## 2024-06-17 RX ORDER — PROCHLORPERAZINE 25 MG
12.5 SUPPOSITORY, RECTAL RECTAL EVERY 12 HOURS PRN
Status: DISCONTINUED | OUTPATIENT
Start: 2024-06-17 | End: 2024-06-20 | Stop reason: HOSPADM

## 2024-06-17 RX ORDER — BISACODYL 10 MG
10 SUPPOSITORY, RECTAL RECTAL DAILY PRN
Status: DISCONTINUED | OUTPATIENT
Start: 2024-06-17 | End: 2024-06-20 | Stop reason: HOSPADM

## 2024-06-17 RX ORDER — ATORVASTATIN CALCIUM 20 MG/1
20 TABLET, FILM COATED ORAL EVERY EVENING
Status: DISCONTINUED | OUTPATIENT
Start: 2024-06-17 | End: 2024-06-20 | Stop reason: HOSPADM

## 2024-06-17 RX ORDER — ACETAMINOPHEN 325 MG/1
975 TABLET ORAL
Status: DISCONTINUED | OUTPATIENT
Start: 2024-06-18 | End: 2024-06-20 | Stop reason: HOSPADM

## 2024-06-17 RX ORDER — NALOXONE HYDROCHLORIDE 0.4 MG/ML
0.4 INJECTION, SOLUTION INTRAMUSCULAR; INTRAVENOUS; SUBCUTANEOUS
Status: DISCONTINUED | OUTPATIENT
Start: 2024-06-17 | End: 2024-06-20 | Stop reason: HOSPADM

## 2024-06-17 RX ORDER — NICOTINE POLACRILEX 4 MG
15-30 LOZENGE BUCCAL
Status: DISCONTINUED | OUTPATIENT
Start: 2024-06-17 | End: 2024-06-20 | Stop reason: HOSPADM

## 2024-06-17 RX ORDER — GADOBUTROL 604.72 MG/ML
9 INJECTION INTRAVENOUS ONCE
Status: COMPLETED | OUTPATIENT
Start: 2024-06-17 | End: 2024-06-17

## 2024-06-17 RX ORDER — GABAPENTIN 100 MG/1
100 CAPSULE ORAL 3 TIMES DAILY
Status: DISCONTINUED | OUTPATIENT
Start: 2024-06-17 | End: 2024-06-20 | Stop reason: HOSPADM

## 2024-06-17 RX ORDER — PROPRANOLOL HYDROCHLORIDE 20 MG/1
60 TABLET ORAL 2 TIMES DAILY
Status: DISCONTINUED | OUTPATIENT
Start: 2024-06-17 | End: 2024-06-20 | Stop reason: HOSPADM

## 2024-06-17 RX ORDER — ALBUTEROL SULFATE 90 UG/1
1-2 AEROSOL, METERED RESPIRATORY (INHALATION) EVERY 4 HOURS PRN
Status: DISCONTINUED | OUTPATIENT
Start: 2024-06-17 | End: 2024-06-20 | Stop reason: HOSPADM

## 2024-06-17 RX ORDER — ALLOPURINOL 100 MG/1
100 TABLET ORAL EVERY EVENING
Status: DISCONTINUED | OUTPATIENT
Start: 2024-06-17 | End: 2024-06-20 | Stop reason: HOSPADM

## 2024-06-17 RX ORDER — POLYETHYLENE GLYCOL 3350 17 G/17G
17 POWDER, FOR SOLUTION ORAL 2 TIMES DAILY PRN
Status: DISCONTINUED | OUTPATIENT
Start: 2024-06-17 | End: 2024-06-20 | Stop reason: HOSPADM

## 2024-06-17 RX ORDER — ROPINIROLE 0.25 MG/1
0.25 TABLET, FILM COATED ORAL DAILY PRN
Status: DISCONTINUED | OUTPATIENT
Start: 2024-06-18 | End: 2024-06-20 | Stop reason: HOSPADM

## 2024-06-17 RX ORDER — VITAMIN B COMPLEX
1 TABLET ORAL EVERY EVENING
Status: ON HOLD | COMMUNITY
End: 2024-06-20

## 2024-06-17 RX ORDER — ROPINIROLE 0.25 MG/1
0.25 TABLET, FILM COATED ORAL
Status: ON HOLD | COMMUNITY
End: 2024-06-20

## 2024-06-17 RX ORDER — HYDROMORPHONE HYDROCHLORIDE 1 MG/ML
0.3 INJECTION, SOLUTION INTRAMUSCULAR; INTRAVENOUS; SUBCUTANEOUS
Status: DISCONTINUED | OUTPATIENT
Start: 2024-06-17 | End: 2024-06-20 | Stop reason: HOSPADM

## 2024-06-17 RX ORDER — ONDANSETRON 2 MG/ML
4 INJECTION INTRAMUSCULAR; INTRAVENOUS EVERY 6 HOURS PRN
Status: DISCONTINUED | OUTPATIENT
Start: 2024-06-17 | End: 2024-06-20 | Stop reason: HOSPADM

## 2024-06-17 RX ORDER — AMLODIPINE BESYLATE 10 MG/1
10 TABLET ORAL EVERY EVENING
Status: DISCONTINUED | OUTPATIENT
Start: 2024-06-17 | End: 2024-06-20 | Stop reason: HOSPADM

## 2024-06-17 RX ORDER — POTASSIUM CHLORIDE 1.5 G/1.58G
60 POWDER, FOR SOLUTION ORAL ONCE
Status: COMPLETED | OUTPATIENT
Start: 2024-06-17 | End: 2024-06-17

## 2024-06-17 RX ORDER — NALOXONE HYDROCHLORIDE 0.4 MG/ML
0.2 INJECTION, SOLUTION INTRAMUSCULAR; INTRAVENOUS; SUBCUTANEOUS
Status: DISCONTINUED | OUTPATIENT
Start: 2024-06-17 | End: 2024-06-20 | Stop reason: HOSPADM

## 2024-06-17 RX ORDER — IBUPROFEN 600 MG/1
600 TABLET, FILM COATED ORAL ONCE
Status: COMPLETED | OUTPATIENT
Start: 2024-06-17 | End: 2024-06-17

## 2024-06-17 RX ORDER — ACETAMINOPHEN 325 MG/1
975 TABLET ORAL 3 TIMES DAILY
Status: DISCONTINUED | OUTPATIENT
Start: 2024-06-17 | End: 2024-06-17

## 2024-06-17 RX ORDER — OXYCODONE HYDROCHLORIDE 5 MG/1
5 TABLET ORAL EVERY 4 HOURS PRN
Status: DISCONTINUED | OUTPATIENT
Start: 2024-06-17 | End: 2024-06-20 | Stop reason: HOSPADM

## 2024-06-17 RX ORDER — HYDROMORPHONE HYDROCHLORIDE 1 MG/ML
0.5 INJECTION, SOLUTION INTRAMUSCULAR; INTRAVENOUS; SUBCUTANEOUS
Status: DISCONTINUED | OUTPATIENT
Start: 2024-06-17 | End: 2024-06-20 | Stop reason: HOSPADM

## 2024-06-17 RX ORDER — AMOXICILLIN 250 MG
2 CAPSULE ORAL 2 TIMES DAILY PRN
Status: DISCONTINUED | OUTPATIENT
Start: 2024-06-17 | End: 2024-06-20 | Stop reason: HOSPADM

## 2024-06-17 RX ORDER — ONDANSETRON 4 MG/1
4 TABLET, ORALLY DISINTEGRATING ORAL EVERY 6 HOURS PRN
Status: DISCONTINUED | OUTPATIENT
Start: 2024-06-17 | End: 2024-06-20 | Stop reason: HOSPADM

## 2024-06-17 RX ORDER — METHOCARBAMOL 500 MG/1
500 TABLET, FILM COATED ORAL ONCE
Status: COMPLETED | OUTPATIENT
Start: 2024-06-17 | End: 2024-06-17

## 2024-06-17 RX ORDER — OXYCODONE HYDROCHLORIDE 5 MG/1
10 TABLET ORAL EVERY 4 HOURS PRN
Status: DISCONTINUED | OUTPATIENT
Start: 2024-06-17 | End: 2024-06-20 | Stop reason: HOSPADM

## 2024-06-17 RX ORDER — DEXAMETHASONE SODIUM PHOSPHATE 10 MG/ML
10 INJECTION, SOLUTION INTRAMUSCULAR; INTRAVENOUS ONCE
Status: COMPLETED | OUTPATIENT
Start: 2024-06-17 | End: 2024-06-17

## 2024-06-17 RX ORDER — AMOXICILLIN 250 MG
1 CAPSULE ORAL 2 TIMES DAILY PRN
Status: DISCONTINUED | OUTPATIENT
Start: 2024-06-17 | End: 2024-06-20 | Stop reason: HOSPADM

## 2024-06-17 RX ORDER — CHLORAL HYDRATE 500 MG
1 CAPSULE ORAL EVERY EVENING
COMMUNITY

## 2024-06-17 RX ORDER — ROPINIROLE 0.25 MG/1
0.25 TABLET, FILM COATED ORAL 2 TIMES DAILY
Status: DISCONTINUED | OUTPATIENT
Start: 2024-06-17 | End: 2024-06-20 | Stop reason: HOSPADM

## 2024-06-17 RX ORDER — ALBUTEROL SULFATE 90 UG/1
1-2 AEROSOL, METERED RESPIRATORY (INHALATION) EVERY 4 HOURS PRN
Status: ON HOLD | COMMUNITY
End: 2024-06-20

## 2024-06-17 RX ADMIN — POTASSIUM CHLORIDE 60 MEQ: 1.5 POWDER, FOR SOLUTION ORAL at 11:45

## 2024-06-17 RX ADMIN — IBUPROFEN 600 MG: 600 TABLET ORAL at 11:45

## 2024-06-17 RX ADMIN — METHOCARBAMOL 500 MG: 500 TABLET ORAL at 11:12

## 2024-06-17 RX ADMIN — PROPRANOLOL HYDROCHLORIDE 60 MG: 20 TABLET ORAL at 22:30

## 2024-06-17 RX ADMIN — GABAPENTIN 100 MG: 100 CAPSULE ORAL at 21:02

## 2024-06-17 RX ADMIN — SODIUM CHLORIDE, POTASSIUM CHLORIDE, SODIUM LACTATE AND CALCIUM CHLORIDE 1000 ML: 600; 310; 30; 20 INJECTION, SOLUTION INTRAVENOUS at 11:45

## 2024-06-17 RX ADMIN — GADOBUTROL 9 ML: 604.72 INJECTION INTRAVENOUS at 15:59

## 2024-06-17 RX ADMIN — ROPINIROLE HYDROCHLORIDE 0.25 MG: 0.25 TABLET, FILM COATED ORAL at 22:31

## 2024-06-17 RX ADMIN — ATORVASTATIN CALCIUM 20 MG: 20 TABLET, FILM COATED ORAL at 21:03

## 2024-06-17 RX ADMIN — ACETAMINOPHEN, ASPIRIN, CAFFEINE 3 TABLET: 250; 65; 250 TABLET, FILM COATED ORAL at 22:31

## 2024-06-17 RX ADMIN — ACETAMINOPHEN 1000 MG: 500 TABLET, FILM COATED ORAL at 11:12

## 2024-06-17 RX ADMIN — DEXAMETHASONE SODIUM PHOSPHATE 10 MG: 10 INJECTION INTRAMUSCULAR; INTRAVENOUS at 11:12

## 2024-06-17 RX ADMIN — AMLODIPINE BESYLATE 10 MG: 10 TABLET ORAL at 21:02

## 2024-06-17 RX ADMIN — ALLOPURINOL 100 MG: 100 TABLET ORAL at 21:02

## 2024-06-17 ASSESSMENT — ACTIVITIES OF DAILY LIVING (ADL)
ADLS_ACUITY_SCORE: 38

## 2024-06-17 NOTE — ED TRIAGE NOTES
Pt BIBA from pt's son's house c/o 10/10 back pain, hx sciatica and multiple hospital and clinic visits for back and leg pain, pt in severe back and bilateral leg pain for last 2 weeks, ABCD intact.       Triage Assessment (Adult)       Row Name 06/17/24 1005          Triage Assessment    Airway WDL WDL        Respiratory WDL    Respiratory WDL WDL        Skin Circulation/Temperature WDL    Skin Circulation/Temperature WDL WDL        Cardiac WDL    Cardiac WDL WDL        Peripheral/Neurovascular WDL    Peripheral Neurovascular WDL WDL        Cognitive/Neuro/Behavioral WDL    Cognitive/Neuro/Behavioral WDL WDL

## 2024-06-17 NOTE — ED PROVIDER NOTES
Emergency Department Note      History of Present Illness     Chief Complaint  Back Pain    HPI  Kyrie Wilkes is a 74 year old female with a history of sciatica, diabetes, hyperlipidemia, and hypertension who presents to the ED via EMS from her son's house for evaluation of back pain.  Patient states that she had back surgery in 2022 for spinal stenosis.  She has been having back pain since but worse in the last 2 weeks with bilateral lower extremity paresthesias.  She denies any bladder or bowel incontinence, loss of sensation, fever, new trauma.  She normally uses a walker at home but has been needing to use a wheelchair due to severe pain.  She has a follow-up with her spinal surgeon tomorrow.  She states that she takes hydrocodone at home but has not been helping with pain.  She was seen in the ED on 6/9/2024 for back pain.    Independent Historian  Patient only.    Review of External Notes  Reviewed hospital admission note from 10/5/2022.  Spondylolisthesis of lumbar region.  Bilateral L4-L5 laminotomy on 10/5/2022.    Reviewed ED note from 6/9/2024.  Bilateral low back pain with right-sided sciatica.  Discharged home with Medrol Dosepak.    Past Medical History   Medical History and Problem List  Claustrophobia  Diabetes   High cholesterol  Hypertension  Restless legs syndrome   Sleep apnea  Thyroid disease  Thoracic spine pain  Fatty liver  Gout    Medications  allopurinol (ZYLOPRIM) 100 MG tablet  amLODIPine (NORVASC) 10 MG tablet  aspirin-acetaminophen-caffeine (EXCEDRIN MIGRAINE) 250-250-65 MG tablet  atorvastatin (LIPITOR) 20 MG tablet  glipiZIDE (GLUCOTROL XL) 5 MG 24 hr tablet  glucosamine-chondroitin 500-400 MG tablet  levothyroxine (SYNTHROID/LEVOTHROID) 125 MCG tablet  lisinopril (ZESTRIL) 20 MG tablet  metFORMIN (GLUCOPHAGE) 500 MG tablet  methylPREDNISolone (MEDROL DOSEPAK) 4 MG tablet therapy pack  multivitamin (CENTRUM SILVER) tablet  propranolol (INDERAL) 60 MG tablet  rOPINIRole (REQUIP)  0.25 MG tablet  vitamin B-12 (CYANOCOBALAMIN) 500 MCG tablet  vitamin B6 (PYRIDOXINE) 100 MG tablet  Vitamin D3 (CHOLECALCIFEROL) 25 mcg (1000 units) tablet        Surgical History   Past Surgical History:   Procedure Laterality Date    CATARACT IOL, RT/LT      CHOLECYSTECTOMY      HYSTERECTOMY, KIMBERLY      LAMINECTOMY LUMBAR ONE LEVEL Bilateral 10/5/2022    Procedure: bilateral lumbar 4 to lumbar 5 laminotomy;  Surgeon: Brian Bland MD;  Location: [facility] OR    OPEN REDUCTION INTERNAL FIXATION ANKLE      SALPINGO-OOPHORECTOMY BILATERAL      TUBAL LIGATION       Physical Exam   Patient Vitals for the past 24 hrs:   BP Temp Temp src Pulse Resp SpO2 Height Weight   06/17/24 1739 (!) 152/81 -- -- 86 -- -- -- --   06/17/24 1402 (!) 153/72 -- -- 85 -- 93 % -- --   06/17/24 1254 (!) 138/128 -- -- -- -- 97 % -- --   06/17/24 1100 137/63 -- -- 80 -- 95 % -- --   06/17/24 1030 132/71 -- -- 79 -- 99 % -- --   06/17/24 1015 129/70 -- -- -- -- 97 % -- --   06/17/24 1004 (!) 142/75 98.1  F (36.7  C) Oral 75 16 95 % 1.524 m (5') 90.7 kg (200 lb)     Physical Exam  Physical Exam:  General: lying comfortably on hospital bed  Head: normocephalic, atraumatic  Eyes: PERRLA, EOMI  Ears: External ears appear normal.   Nose: no signs of bleeding   Throat: moist mucous membranes  Neck: No JVD  CV: regular rate and rhythm  Pulm: lungs clear to ausculation bilaterally, normal respiratory effort, normal chest expansion with breathing   Abdomen: soft, non-tender, non-distended  MSK: No midline tenderness. Bilateral Lumbar tenderness.   Ext: normal range of motion of all extremities. No gross deformities.  Negative straight leg test of right leg.  Normal dorsiflexion and plantarflexion of bilateral feet.  Normal hip flexion and hip extension on bilateral sides.  Distal pulses intact.  Skin: warm, dry, no rashes  Neuro: alert and oriented.  Sensation to light touch grossly intact in bilateral lower extremities.  Psych: Appropriate mood.  [facility]    Diagnostics   Lab Results   Labs Ordered and Resulted from Time of ED Arrival to Time of ED Departure   BASIC METABOLIC PANEL - Abnormal       Result Value    Sodium 144      Potassium 2.3 (*)     Chloride 105      Carbon Dioxide (CO2) 25      Anion Gap 14      Urea Nitrogen 21.6      Creatinine 0.58      GFR Estimate >90      Calcium 7.8 (*)     Glucose 130 (*)    CBC WITH PLATELETS AND DIFFERENTIAL - Abnormal    WBC Count 13.2 (*)     RBC Count 4.71      Hemoglobin 14.1      Hematocrit 42.1      MCV 89      MCH 29.9      MCHC 33.5      RDW 14.1      Platelet Count 241      % Neutrophils 77      % Lymphocytes 13      % Monocytes 8      % Eosinophils 0      % Basophils 0      % Immature Granulocytes 1      NRBCs per 100 WBC 0      Absolute Neutrophils 10.2 (*)     Absolute Lymphocytes 1.8      Absolute Monocytes 1.0      Absolute Eosinophils 0.1      Absolute Basophils 0.1      Absolute Immature Granulocytes 0.1      Absolute NRBCs 0.0     MAGNESIUM - Normal    Magnesium 1.7     POTASSIUM - Normal    Potassium 3.5     CALCIUM       Imaging  MR Lumbar Spine w/o & w Contrast   Final Result   IMPRESSION:   1.  Acute inferior plate of L3 compression fracture with 20% vertebral body height loss.    2.  Superior endplate L2 compression deformity with greater than 50% vertebral body height loss with mild edema which likely represents a late subacute compression fracture.    3.  Inferior endplate Schmorl's node with edema at L1 which likely represents an acute Schmorl's node.    4.  Chronic superior plate L5 Schmorl's node.    5.  L4 spondylolysis with spondylolisthesis of L4 and L5 measuring 4 mm.    6.  Degenerative lumbar spondylosis with level by level.          EKG   None    Independent Interpretation  None  ED Course    Medications Administered  Medications   acetaminophen (TYLENOL) tablet 1,000 mg (1,000 mg Oral $Given 6/17/24 1112)   methocarbamol (ROBAXIN) tablet 500 mg (500 mg Oral $Given 6/17/24  1112)   gabapentin (NEURONTIN) capsule 300 mg (300 mg Oral Not Given 6/17/24 1159)   ibuprofen (ADVIL/MOTRIN) tablet 600 mg (600 mg Oral $Given 6/17/24 1145)   dexAMETHasone PF (DECADRON) injection 10 mg (10 mg Intravenous $Given 6/17/24 1112)   lactated ringers BOLUS 1,000 mL (0 mLs Intravenous Stopped 6/17/24 1448)   potassium chloride (KLOR-CON) Packet 60 mEq (60 mEq Oral $Given 6/17/24 1145)   gadobutrol (GADAVIST) injection 9 mL (9 mLs Intravenous $Given 6/17/24 1559)       Procedures  Procedures     Discussion of Management  Admitting Hospitalist, Dr. Vann    Social Determinants of Health adding to complexity of care  Transportation    ED Course  ED Course as of 06/17/24 1759   Mon Jun 17, 2024   1034 I evaluated patient and obtained history.   1654 Reassessed patient. States pain is not improving.    1734 Discussed with Dr. Riggins. Accepted patient for admission.     Medical Decision Making / Diagnosis   CMS Diagnoses: None    MIPS     None    [facility]  Kyrie Wilkes is a 74 year old female who presents to the ED for evaluation of back pain.  Vitals show elevated blood pressure otherwise normal.  Patient has a history of lumbar back pain.  She had a laminotomy on 10/5/2022 for severe spondylolisthesis of L4 and L5.  Since then she has been having ongoing back pain has been worse in the last 2 weeks.  She was seen in the ED on 6/9/2024 for acute low back pain with right-sided sciatica.  She was given a Medrol dose pack at this time.  She states that this did not help with the pain.  She endorses bilateral lower extremity numbness and tingling.  She denies any new trauma, fever, bladder or bowel incontinence.  She normally ambulates with a walker but has been requiring wheelchair assistance in the last couple weeks due to pain.  She is currently living with family who is having a hard time transferring her on the wheelchair.  She currently follows with Dr. Pond, spinal surgery who she has an appointment with  tomorrow.  She states that she is unable to wait for that appointment due to severe pain.  She takes oral hydrocodone at home but has been ineffective.  See further HPI details above.  Broad differential was considered including acute on chronic back pain, compression fracture, spinal stenosis, epidural abscess.  On exam, patient has tenderness in the lumbar region.  She has negative straight leg test on exam.  She has normal range of motion and strength of bilateral lower extremities.  She has normal sensation to light touch of bilateral lower extremities.  Labs were drawn to evaluate patient's kidney function.  Kidney function is normal.  Her potassium was low at 2.3.  Her magnesium is normal.  Per chart review, patient has a history of hypokalemia in the past.  She is not currently on oral potassium supplements.  Patient was given pain medications, oral potassium replacement and IV LR bolus.  Repeat potassium was 3.5.  MRI showed acute compression fracture of L3, superior endplate L2 compression deformity, L4 spondylolysis with spondylolisthesis of L4 and L5.  On reassessment, patient states that her pain is unchanged and she does not think she can go home with her level of pain.  Family states that they are unable to care for her at home.  Discussed with hospitalist for admission for further pain control and PT evaluation.  Hospitalist accepted patient for admission.  All questions were answered.    Disposition  The patient was admitted to the hospital.     ICD-10 Codes:    ICD-10-CM    1. Chronic bilateral low back pain with bilateral sciatica  M54.42     M54.41     G89.29       2. Compression fracture of L3 vertebra, initial encounter (H)  S32.030A       3. Spondylolisthesis of lumbar region  M43.16       4. Hypokalemia  E87.6            Discharge Medications  New Prescriptions    No medications on file         NELIA Montana Kausar, PA-C  06/17/24 1676

## 2024-06-17 NOTE — ED NOTES
[facility]  ED Nurse Handoff Report    ED Chief complaint: Back Pain      ED Diagnosis:   Final diagnoses:   None       Code Status: Assumed full - admitting MD to establish with patient.    Allergies:   Allergies   Allergen Reactions    Amoxicillin Anaphylaxis and Swelling    Penicillins Anaphylaxis and Swelling       Patient Story: Pt BIBA from pt's son's house c/o 10/10 back pain, hx sciatica and multiple hospital and clinic visits for back and leg pain, pt in severe back and bilateral leg pain for last 2 weeks, ABCD intact.     Focused Assessment:  AxOx4, HTN otherwise VSS, pain 10/10 prior to intervention    Treatments and/or interventions provided: IV placed, imaging, medication, labs     Patient's response to treatments and/or interventions: See results, see MAR    To be done/followed up on inpatient unit:  See orders    Does this patient have any cognitive concerns?:  No    Activity level - Baseline/Home:  Independent  Activity Level - Current:   Stand with Assist    Patient's Preferred language: English   Needed?: No    Isolation: None  Infection: Not Applicable  Patient tested for COVID 19 prior to admission: NO  Bariatric?: No    Vital Signs:   Vitals:    06/17/24 1030 06/17/24 1100 06/17/24 1254 06/17/24 1402   BP: 132/71 137/63 (!) 138/128 (!) 153/72   Pulse: 79 80  85   Resp:       Temp:       TempSrc:       SpO2: 99% 95% 97% 93%   Weight:       Height:           Cardiac Rhythm:     Was the PSS-3 completed:   Yes  What interventions are required if any?               Family Comments:   OBS brochure/video discussed/provided to patient/family: N/A              Name of person given brochure if not patient:               Relationship to patient:     For the majority of the shift this patient's behavior was Green.   Behavioral interventions performed.    ED NURSE PHONE NUMBER: [phone]

## 2024-06-17 NOTE — H&P
[facility]    History and Physical - Hospitalist Service       Date of Admission:  6/17/2024    Assessment & Plan      Kyrie Wilkes is a 74 year old female admitted on 6/17/2024. She presents with low back pain and bilateral leg paresthesias.    Progressive bilateral leg paresthesias  Low back pain  Acute L3 compression fracture  Superior endplate L2 compression deformity, likely subacute  Likely acute L1 Schmorl's node  Chronic L5 Schmorl's node  L4 spondylolysis with spondylolisthesis of L4 and L5  History of sciatica status post bilateral L4-L5 laminectomy on 10/5/2022  Patient presents with bilateral leg paresthesias.  She states that it began with left-sided leg paresthesia and quickly advanced right-sided leg paresthesia.  Since that time pain in her legs has worsened.  She denies any preceding trauma.  She does have pain in her low back at the level of her pelvis and a somewhat bandlike distribution.  She denies any weakness in her legs or other bowel or bladder incontinence.  No fevers or chills.  She does not have significant midline back pain.  Unclear if this is due to her acute compression fracture.  - Observation  - Consult spine surgery, has appointment to see Dr. Bland on 6/18  -Pain control  - Start gabapentin for pain  - PT    Diabetes  - Hold PTA metformin and glipizide  - Medium dose sliding scale    Gout  - Continue PTA amlodipine    Hypertension  - Continue PTA amlodipine and propranolol and lisinopril    Hypothyroidism  - Continue levothyroxine    Hyperlipidemia  - Continue PTA atorvastatin    RLS  - Continue PTA propranolol          Diet:  Diabetic diet  DVT Prophylaxis: Pneumatic Compression Devices  Arrington Catheter: Not present  Lines: None     Cardiac Monitoring: None  Code Status:  DNR/DNI, discussed with patient and family on admission    Clinically Significant Risk Factors Present on Admission        # Hypokalemia: Lowest K = 2.3 mmol/L in last 2 days, will  replace as needed           # Hypertension: Noted on problem list             # Obesity: Estimated body mass index is 39.06 kg/m  as calculated from the following:    Height as of this encounter: 1.524 m (5').    Weight as of this encounter: 90.7 kg (200 lb).              Disposition Plan     Medically Ready for Discharge: Anticipated Tomorrow           Kan Riggins MD  Hospitalist Service  [facility]  Securely message with [employer] (more info)  Text page via [email or web address] Paging/Directory     ______________________________________________________________________    Chief Complaint   Leg pain    History is obtained from the patient, electronic health record, emergency department physician, patient's daughter in law, and patient's son    History of Present Illness   Kyrie Wilkes is a 74 year old female who presents to the emergency department with approximately 2 to 3 weeks of progressive bilateral leg pain.  She reports that the pain is worse than her prior episodes of sciatica for which she underwent an L4-L5 laminectomy in 2022.  She denies any muscle weakness.  She has had some falls to the ground related to pain limiting her ambulation.  Currently she has been staying with her son and daughter-in-law for the last 2 to 3 days due to inability to safely get around her own home.  She is needing to be bodily carried from wheelchair to toilet and wheelchair to bed.  She is unable to ambulate currently.  She states that the pain is worse than childbirth.      Past Medical History    Past Medical History:   Diagnosis Date    Claustrophobia     with CPAP, OK with imaging    Diabetes (H)     High cholesterol     Hypertension     Restless legs syndrome (RLS)     Sleep apnea     Thyroid disease        Past Surgical History   Past Surgical History:   Procedure Laterality Date    CATARACT IOL, RT/LT      CHOLECYSTECTOMY      HYSTERECTOMY, KIMBERLY      LAMINECTOMY LUMBAR ONE LEVEL Bilateral 10/5/2022     Procedure: bilateral lumbar 4 to lumbar 5 laminotomy;  Surgeon: Brian Bland MD;  Location: [facility] OR    OPEN REDUCTION INTERNAL FIXATION ANKLE      SALPINGO-OOPHORECTOMY BILATERAL      TUBAL LIGATION         Prior to Admission Medications   Prior to Admission Medications   Prescriptions Last Dose Informant Patient Reported? Taking?   Vitamin D3 (CHOLECALCIFEROL) 25 mcg (1000 units) tablet  Self Yes No   Sig: Take 1-2 tablets by mouth 2 times daily   allopurinol (ZYLOPRIM) 100 MG tablet  Self Yes No   Sig: Take 100 mg by mouth every evening   amLODIPine (NORVASC) 10 MG tablet  Self Yes No   Sig: Take 10 mg by mouth every evening   aspirin-acetaminophen-caffeine (EXCEDRIN MIGRAINE) 250-250-65 MG tablet  Self Yes No   Sig: Take 3 tablets by mouth 2 times daily   atorvastatin (LIPITOR) 20 MG tablet  Self Yes No   Sig: Take 20 mg by mouth every evening   glipiZIDE (GLUCOTROL XL) 5 MG 24 hr tablet  Self Yes No   Sig: Take 5 mg by mouth daily   glucosamine-chondroitin 500-400 MG tablet  Self Yes No   Sig: Take 1 tablet by mouth 2 times daily   levothyroxine (SYNTHROID/LEVOTHROID) 125 MCG tablet  Self Yes No   Sig: Take 125 mcg by mouth every morning   lisinopril (ZESTRIL) 20 MG tablet  Self Yes No   Sig: Take 20 mg by mouth every morning   metFORMIN (GLUCOPHAGE) 500 MG tablet  Self Yes No   Sig: Take 1,000 mg by mouth 2 times daily (500MG X 2 = 1,000MG)   methylPREDNISolone (MEDROL DOSEPAK) 4 MG tablet therapy pack   No No   Sig: Follow Package Directions   multivitamin (CENTRUM SILVER) tablet  Self Yes No   Sig: Take 1 tablet by mouth every evening   oxyCODONE (ROXICODONE) 5 MG tablet   No No   Sig: Take 1 tablet (5 mg) by mouth every 4 hours as needed for moderate to severe pain   propranolol (INDERAL) 60 MG tablet  Self Yes No   Sig: Take 60 mg by mouth 2 times daily   rOPINIRole (REQUIP) 0.25 MG tablet  Self Yes No   Sig: Take 0.25 mg by mouth 3 times daily as needed   vitamin B-12 (CYANOCOBALAMIN) 500 MCG tablet  Self Yes  No   Sig: Take 500 mcg by mouth every evening   vitamin B6 (PYRIDOXINE) 100 MG tablet  Self Yes No   Sig: Take 100 mg by mouth every evening      Facility-Administered Medications: None           Physical Exam   Vital Signs: Temp: 98.1  F (36.7  C) Temp src: Oral BP: (!) 153/72 Pulse: 85   Resp: 16 SpO2: 93 % O2 Device: None (Room air)    Weight: 200 lbs 0 oz    Constitutional: Awake, alert, cooperative, no apparent distress  Respiratory: Clear to auscultation bilaterally, no crackles or wheezing  Cardiovascular: Regular rate and rhythm, normal S1 and S2, and no murmur noted  GI: Normal bowel sounds, soft, non-distended, non-tender  Skin/Integumen: No rashes, no cyanosis, no edema  Other: Straight leg raise is limited to 10 to 15 degrees bilaterally due to significant pain      Medical Decision Making       80 MINUTES SPENT BY ME on the date of service doing chart review, history, exam, documentation & further activities per the note.      Data   ------------------------- PAST 24 HR DATA REVIEWED -----------------------------------------------    I have personally reviewed the following data over the past 24 hrs:    13.2 (H)  \   14.1   / 241     144 105 21.6 /  130 (H)   3.5 25 0.58 \       Imaging results reviewed over the past 24 hrs:   Recent Results (from the past 24 hour(s))   MR Lumbar Spine w/o & w Contrast    Narrative    EXAM: MR LUMBAR SPINE W/O and W CONTRAST  LOCATION: [facility]  DATE: 6/17/2024    INDICATION: severe low back pain, bilateral radicular pain and paresthesias  COMPARISON: None.  CONTRAST: 9mL Gadavist  TECHNIQUE: Routine Lumbar Spine MRI without and with IV contrast.    FINDINGS:   Nomenclature is based on 5 lumbar type vertebral bodies. L4 spondylolysis with spondylolisthesis of L4 and L5 measuring 4 mm. Acute inferior plate of L3 compression fracture with 20% vertebral body height loss. Superior endplate L2 compression deformity   with greater than 50%  vertebral body height loss with mild edema which likely represents a late subacute compression fracture. Inferior endplate Schmorl's node with edema at L1 which likely represents an acute Schmorl's node. Chronic superior plate L5   Schmorl's node. Anterolisthesis of L4 and L5 measures 4 mm. The vertebral bodies of the lumbar spine otherwise have normal stature, alignment, and marrow signal intensity. Normal distal spinal cord and cauda equina with conus medullaris at the inferior   plate of L1. The partially imaged intra-abdominal contents are unremarkable. Unremarkable visualized bony pelvis.    T12-L1: Mild loss of disc signal and disc height. No posterior disc bulge or spinal canal narrowing. No neural foraminal narrowing.     L1-L2: Symmetric disc bulge, facet arthropathy and ligamentum flavum thickening contributing to severe spinal canal and bilateral lateral recess narrowing. Severe right and moderate left neural foraminal narrowing.    L2-L3: Symmetric disc bulge and moderate facet arthropathy with moderate spinal canal narrowing. Severe right and moderate left neural foraminal narrowing.     L3-L4: Symmetric disc bulge and moderate facet arthropathy with moderate spinal canal narrowing. Uncovertebral joint disease and facet arthropathy with severe left and moderate to severe right neural foraminal narrowing.    L4-L5: L4 spondylolysis with spondylolisthesis of L4 and L5 measuring 4 mm. Symmetric disc bulge, facet arthropathy and ligamentum flavum thickening with severe spinal canal and bilateral lateral recess narrowing. Severe bilateral neural foraminal   narrowing.    L5-S1: Symmetric disc bulge and moderate facet arthropathy without significant spinal canal narrowing. Mild bilateral neural foraminal narrowing.      Impression    IMPRESSION:  1.  Acute inferior plate of L3 compression fracture with 20% vertebral body height loss.   2.  Superior endplate L2 compression deformity with greater than 50%  vertebral body height loss with mild edema which likely represents a late subacute compression fracture.   3.  Inferior endplate Schmorl's node with edema at L1 which likely represents an acute Schmorl's node.   4.  Chronic superior plate L5 Schmorl's node.   5.  L4 spondylolysis with spondylolisthesis of L4 and L5 measuring 4 mm.   6.  Degenerative lumbar spondylosis with level by level.

## 2024-06-17 NOTE — ED PROVIDER NOTES
ED ATTENDING PHYSICIAN NOTE:   I evaluated this patient in conjunction with Ramin Fierro PA-C  I have participated in the care of the patient and personally performed key elements of the history, exam, and medical decision making.      HPI:   Kyrie Wilkes is a 74 year old female with a history of sciatica, type 2 diabetes mellitus, hypertension, and hyperlipidemia who presents to the ED via EMS from her son's house for evaluation of back pain. The patient reports that she has chronic back and bilateral leg pain and has been seen multiple times in the hospital and clinic in the past. Her pain and numbness has recently become worse. She usually uses a walker at home, but has had to transfer to a wheel chair. She is living with her son and daughter in law due to her pain and decreased mobility.  Says that she is barely eating or drinking because she wants to limit the number of times she has to get up to use the bathroom.  Outside of this, she had a bilateral lumbar 4 to lumbar 5 laminotomy on 10/5/2022. She has an appointment with her PCP tomorrow, but is concerned that she won't be able to go due to the pain.     Independent Historian:   None    Review of External Notes: Op note 2022 Dr. Bland.  Patient had a laminotomy L4-5 for neurogenic claudication due to spondylolisthesis.       Physical Exam   Patient Vitals for the past 24 hrs:   BP Temp Temp src Pulse Resp SpO2 Height Weight   06/17/24 1739 (!) 152/81 -- -- 86 -- -- -- --   06/17/24 1402 (!) 153/72 -- -- 85 -- 93 % -- --   06/17/24 1254 (!) 138/128 -- -- -- -- 97 % -- --   06/17/24 1100 137/63 -- -- 80 -- 95 % -- --   06/17/24 1030 132/71 -- -- 79 -- 99 % -- --   06/17/24 1015 129/70 -- -- -- -- 97 % -- --   06/17/24 1004 (!) 142/75 98.1  F (36.7  C) Oral 75 16 95 % 1.524 m (5') 90.7 kg (200 lb)     Physical Exam  General: Awake, alert, in no acute distress   HEENT: Atraumatic   EOM normal   External ears normal   Trachea midline  Neck: Supple, normal  ROM  CV: Regular rate, regular rhythm   No lower extremity edema  2+ radial and DP pulses  PULM: Breath sounds normal bilaterally  No wheezes or rales  ABD: Soft, non-tender, non-distended  Normal bowel sounds   No rebound or guarding   MSK: No gross deformities  NEURO: Alert, no focal deficits  5 out of 5 strength hip flexion and extension, knee flexion and extension, dorsi and plantarflexion  No sensory deficits  Cranial nerves II through XII grossly intact  Skin: Warm, dry and intact      Diagnostics   Lab Results   Labs Ordered and Resulted from Time of ED Arrival to Time of ED Departure   BASIC METABOLIC PANEL - Abnormal       Result Value    Sodium 144      Potassium 2.3 (*)     Chloride 105      Carbon Dioxide (CO2) 25      Anion Gap 14      Urea Nitrogen 21.6      Creatinine 0.58      GFR Estimate >90      Calcium 7.8 (*)     Glucose 130 (*)    CBC WITH PLATELETS AND DIFFERENTIAL - Abnormal    WBC Count 13.2 (*)     RBC Count 4.71      Hemoglobin 14.1      Hematocrit 42.1      MCV 89      MCH 29.9      MCHC 33.5      RDW 14.1      Platelet Count 241      % Neutrophils 77      % Lymphocytes 13      % Monocytes 8      % Eosinophils 0      % Basophils 0      % Immature Granulocytes 1      NRBCs per 100 WBC 0      Absolute Neutrophils 10.2 (*)     Absolute Lymphocytes 1.8      Absolute Monocytes 1.0      Absolute Eosinophils 0.1      Absolute Basophils 0.1      Absolute Immature Granulocytes 0.1      Absolute NRBCs 0.0     MAGNESIUM - Normal    Magnesium 1.7     POTASSIUM - Normal    Potassium 3.5     CALCIUM       Imaging  MR Lumbar Spine w/o & w Contrast   Final Result   IMPRESSION:   1.  Acute inferior plate of L3 compression fracture with 20% vertebral body height loss.    2.  Superior endplate L2 compression deformity with greater than 50% vertebral body height loss with mild edema which likely represents a late subacute compression fracture.    3.  Inferior endplate Schmorl's node with edema at L1 which  likely represents an acute Schmorl's node.    4.  Chronic superior plate L5 Schmorl's node.    5.  L4 spondylolysis with spondylolisthesis of L4 and L5 measuring 4 mm.    6.  Degenerative lumbar spondylosis with level by level.        Independent Interpretation (X-rays, CTs, rhythm strip):  None    Consultations/Discussion of Management or Tests:  Hospitalist by NELIA    Social Determinants of Health affecting care:   None     MEDICAL DECISION MAKING/ASSESSMENT AND PLAN:   This 74-year-old woman with known spondylolisthesis and prior back surgery in 2022 who presents with atraumatic worsening low back pain, bilateral radicular pain and paresthesias when she stands fully erect that have gotten worse over the past couple of weeks, especially past couple of days causing her to move in with her family due to lack of ability to be able to care for herself.    Due to worsening paresthesias, MRI lumbar spine which shows acute and subacute compression fractures of various levels, spondylolisthesis that appears similar to prior based on radiology read.     Patient is failing oxycodone at home, not taking much else for her back.  Discussed multimodal approach with oral agents including steroids, muscle relaxers, NSAIDs, physical therapy.  I also ordered her gabapentin though she declined this.  Despite oral agents, unable to control pain reasonably.  Plan for observation admission for intractable low back pain.  Patient amenable to plan     DIAGNOSIS:     ICD-10-CM    1. Chronic bilateral low back pain with bilateral sciatica  M54.42     M54.41     G89.29       2. Compression fracture of L3 vertebra, initial encounter (H)  S32.030A       3. Spondylolisthesis of lumbar region  M43.16       4. Hypokalemia  E87.6           DISPOSITION:   admit     Scribe Disclosure:  Alli WASHBURN, am serving as a scribe at 11:31 AM on 6/17/2024 to document services personally performed by Sharlene Em DO, based on my observations and  the provider's statements to me.   6/17/2024  [facility] EMERGENCY DEPT     Sharlene Em DO  06/17/24 [id number]

## 2024-06-17 NOTE — PHARMACY-ADMISSION MEDICATION HISTORY
Pharmacist Admission Medication History    Admission medication history is complete. The information provided in this note is only as accurate as the sources available at the time of the update.    Information Source(s): Patient and CareEverywhere/SureScripts via in-person    Pertinent Information: None    Changes made to PTA medication list:  Added: Albuterol inhaler  Deleted: oxycodone, medrol dose alan (completed 6/9-6/14/24)  Changed:   Ropinirole 0.25 mg three times daily--> 0.25 mg twice daily and can take additional 0.25 mg at noon as needed    Allergies reviewed with patient and updates made in EHR: yes    Medication History Completed By: Yvonne Basilio [facility] 6/17/2024 6:44 PM    PTA Med List   Medication Sig Last Dose    albuterol (PROAIR HFA/PROVENTIL HFA/VENTOLIN HFA) 108 (90 Base) MCG/ACT inhaler Inhale 1-2 puffs into the lungs every 4 hours as needed for shortness of breath or wheezing PRN    allopurinol (ZYLOPRIM) 100 MG tablet Take 100 mg by mouth every evening 6/16/2024 at pm    amLODIPine (NORVASC) 10 MG tablet Take 10 mg by mouth every evening 6/16/2024 at pm    aspirin-acetaminophen-caffeine (EXCEDRIN MIGRAINE) 250-250-65 MG tablet Take 3 tablets by mouth every evening 6/16/2024 at pm    atorvastatin (LIPITOR) 20 MG tablet Take 20 mg by mouth every evening 6/16/2024 at pm    fish oil-omega-3 fatty acids 1000 MG capsule Take 1 g by mouth every evening 6/16/2024 at pm    Flaxseed, Linseed, (FLAXSEED OIL) 1400 MG CAPS Take 1 capsule by mouth every evening 6/16/2024 at pm    glipiZIDE (GLUCOTROL XL) 5 MG 24 hr tablet Take 5 mg by mouth every evening 6/16/2024 at pm    glucosamine-chondroitinoitin 750-600 MG TABS Take 1 tablet by mouth 2 times daily 6/17/2024 at am    levothyroxine (SYNTHROID/LEVOTHROID) 125 MCG tablet Take 125 mcg by mouth every morning 6/17/2024 at am    lisinopril (ZESTRIL) 20 MG tablet Take 20 mg by mouth every morning 6/17/2024 at am    metFORMIN (GLUCOPHAGE) 500 MG tablet Take 1,000  mg by mouth 2 times daily (500MG X 2 = 1,000MG) 6/17/2024 at am    Milk Thistle 175 MG TABS Take 1 tablet by mouth every evening 6/16/2024 at pm    multivitamin (CENTRUM SILVER) tablet Take 1 tablet by mouth every evening 6/16/2024 at pm    propranolol (INDERAL) 60 MG tablet Take 60 mg by mouth 2 times daily 6/17/2024 at am    rOPINIRole (REQUIP) 0.25 MG tablet Take 0.25 mg by mouth At noon as needed for restless leg PRN    rOPINIRole (REQUIP) 0.25 MG tablet Take 0.25 mg by mouth 2 times daily 6/17/2024 at am    vitamin B-12 (CYANOCOBALAMIN) 500 MCG tablet Take 500 mcg by mouth every evening 6/16/2024 at pm    vitamin B6 (PYRIDOXINE) 100 MG tablet Take 100 mg by mouth every evening 6/16/2024 at pm    Vitamin D3 (CHOLECALCIFEROL) 25 mcg (1000 units) tablet Take 1 tablet by mouth every evening (In addition to 2000 units in morning) 6/16/2024 at pm    Vitamin D3 (CHOLECALCIFEROL) 25 mcg (1000 units) tablet Take 2 tablets by mouth every morning (In addition to 1000 units in evening) 6/17/2024 at am

## 2024-06-17 NOTE — ED NOTES
Bed: ED09  Expected date:   Expected time:   Means of arrival:   Comments:  Princess [id number] 38F back pain

## 2024-06-18 ENCOUNTER — APPOINTMENT (OUTPATIENT)
Dept: GENERAL RADIOLOGY | Facility: CLINIC | Age: 74
End: 2024-06-18
Attending: STUDENT IN AN ORGANIZED HEALTH CARE EDUCATION/TRAINING PROGRAM
Payer: MEDICARE

## 2024-06-18 ENCOUNTER — APPOINTMENT (OUTPATIENT)
Dept: PHYSICAL THERAPY | Facility: CLINIC | Age: 74
End: 2024-06-18
Attending: STUDENT IN AN ORGANIZED HEALTH CARE EDUCATION/TRAINING PROGRAM
Payer: MEDICARE

## 2024-06-18 LAB
ANION GAP SERPL CALCULATED.3IONS-SCNC: 10 MMOL/L (ref 7–15)
BUN SERPL-MCNC: 18.1 MG/DL (ref 8–23)
CALCIUM SERPL-MCNC: 8.9 MG/DL (ref 8.8–10.2)
CHLORIDE SERPL-SCNC: 100 MMOL/L (ref 98–107)
CREAT SERPL-MCNC: 0.61 MG/DL (ref 0.51–0.95)
DEPRECATED HCO3 PLAS-SCNC: 30 MMOL/L (ref 22–29)
EGFRCR SERPLBLD CKD-EPI 2021: >90 ML/MIN/1.73M2
ERYTHROCYTE [DISTWIDTH] IN BLOOD BY AUTOMATED COUNT: 14.2 % (ref 10–15)
GLUCOSE BLDC GLUCOMTR-MCNC: 134 MG/DL (ref 70–99)
GLUCOSE BLDC GLUCOMTR-MCNC: 140 MG/DL (ref 70–99)
GLUCOSE BLDC GLUCOMTR-MCNC: 149 MG/DL (ref 70–99)
GLUCOSE BLDC GLUCOMTR-MCNC: 202 MG/DL (ref 70–99)
GLUCOSE BLDC GLUCOMTR-MCNC: 268 MG/DL (ref 70–99)
GLUCOSE SERPL-MCNC: 146 MG/DL (ref 70–99)
HCT VFR BLD AUTO: 37.8 % (ref 35–47)
HGB BLD-MCNC: 12.2 G/DL (ref 11.7–15.7)
MCH RBC QN AUTO: 28.8 PG (ref 26.5–33)
MCHC RBC AUTO-ENTMCNC: 32.3 G/DL (ref 31.5–36.5)
MCV RBC AUTO: 89 FL (ref 78–100)
PLATELET # BLD AUTO: 228 10E3/UL (ref 150–450)
POTASSIUM SERPL-SCNC: 2.9 MMOL/L (ref 3.4–5.3)
POTASSIUM SERPL-SCNC: 3.8 MMOL/L (ref 3.4–5.3)
RBC # BLD AUTO: 4.24 10E6/UL (ref 3.8–5.2)
SODIUM SERPL-SCNC: 140 MMOL/L (ref 135–145)
WBC # BLD AUTO: 8.7 10E3/UL (ref 4–11)

## 2024-06-18 PROCEDURE — 250N000013 HC RX MED GY IP 250 OP 250 PS 637: Performed by: STUDENT IN AN ORGANIZED HEALTH CARE EDUCATION/TRAINING PROGRAM

## 2024-06-18 PROCEDURE — 255N000002 HC RX 255 OP 636: Mod: JZ | Performed by: STUDENT IN AN ORGANIZED HEALTH CARE EDUCATION/TRAINING PROGRAM

## 2024-06-18 PROCEDURE — 250N000009 HC RX 250: Performed by: STUDENT IN AN ORGANIZED HEALTH CARE EDUCATION/TRAINING PROGRAM

## 2024-06-18 PROCEDURE — G0378 HOSPITAL OBSERVATION PER HR: HCPCS

## 2024-06-18 PROCEDURE — 36415 COLL VENOUS BLD VENIPUNCTURE: CPT | Performed by: STUDENT IN AN ORGANIZED HEALTH CARE EDUCATION/TRAINING PROGRAM

## 2024-06-18 PROCEDURE — 97161 PT EVAL LOW COMPLEX 20 MIN: CPT | Mod: GP

## 2024-06-18 PROCEDURE — 250N000013 HC RX MED GY IP 250 OP 250 PS 637: Performed by: NURSE PRACTITIONER

## 2024-06-18 PROCEDURE — 82962 GLUCOSE BLOOD TEST: CPT

## 2024-06-18 PROCEDURE — 36415 COLL VENOUS BLD VENIPUNCTURE: CPT | Performed by: NURSE PRACTITIONER

## 2024-06-18 PROCEDURE — 250N000012 HC RX MED GY IP 250 OP 636 PS 637: Performed by: STUDENT IN AN ORGANIZED HEALTH CARE EDUCATION/TRAINING PROGRAM

## 2024-06-18 PROCEDURE — 85027 COMPLETE CBC AUTOMATED: CPT | Performed by: STUDENT IN AN ORGANIZED HEALTH CARE EDUCATION/TRAINING PROGRAM

## 2024-06-18 PROCEDURE — 80048 BASIC METABOLIC PNL TOTAL CA: CPT | Performed by: STUDENT IN AN ORGANIZED HEALTH CARE EDUCATION/TRAINING PROGRAM

## 2024-06-18 PROCEDURE — 84132 ASSAY OF SERUM POTASSIUM: CPT | Mod: 91 | Performed by: NURSE PRACTITIONER

## 2024-06-18 PROCEDURE — 97530 THERAPEUTIC ACTIVITIES: CPT | Mod: GP

## 2024-06-18 PROCEDURE — 62323 NJX INTERLAMINAR LMBR/SAC: CPT

## 2024-06-18 PROCEDURE — 250N000011 HC RX IP 250 OP 636: Performed by: STUDENT IN AN ORGANIZED HEALTH CARE EDUCATION/TRAINING PROGRAM

## 2024-06-18 PROCEDURE — 99233 SBSQ HOSP IP/OBS HIGH 50: CPT | Performed by: NURSE PRACTITIONER

## 2024-06-18 RX ORDER — GABAPENTIN 100 MG/1
100 CAPSULE ORAL 3 TIMES DAILY
Qty: 30 CAPSULE | Refills: 0 | Status: SHIPPED | OUTPATIENT
Start: 2024-06-18 | End: 2024-06-20

## 2024-06-18 RX ORDER — BETAMETHASONE SODIUM PHOSPHATE AND BETAMETHASONE ACETATE 3; 3 MG/ML; MG/ML
6 INJECTION, SUSPENSION INTRA-ARTICULAR; INTRALESIONAL; INTRAMUSCULAR; SOFT TISSUE ONCE
Status: COMPLETED | OUTPATIENT
Start: 2024-06-18 | End: 2024-06-18

## 2024-06-18 RX ORDER — LIDOCAINE HYDROCHLORIDE 10 MG/ML
5 INJECTION, SOLUTION EPIDURAL; INFILTRATION; INTRACAUDAL; PERINEURAL ONCE
Status: COMPLETED | OUTPATIENT
Start: 2024-06-18 | End: 2024-06-18

## 2024-06-18 RX ORDER — ONDANSETRON 4 MG/1
4 TABLET, ORALLY DISINTEGRATING ORAL EVERY 6 HOURS PRN
Qty: 10 TABLET | Refills: 0 | Status: SHIPPED | OUTPATIENT
Start: 2024-06-18 | End: 2024-06-20

## 2024-06-18 RX ORDER — POTASSIUM CHLORIDE 1500 MG/1
20 TABLET, EXTENDED RELEASE ORAL ONCE
Status: COMPLETED | OUTPATIENT
Start: 2024-06-18 | End: 2024-06-18

## 2024-06-18 RX ORDER — AMOXICILLIN 250 MG
2 CAPSULE ORAL 2 TIMES DAILY PRN
Qty: 20 TABLET | Refills: 0 | Status: SHIPPED | OUTPATIENT
Start: 2024-06-18 | End: 2024-06-20

## 2024-06-18 RX ORDER — POTASSIUM CHLORIDE 1500 MG/1
40 TABLET, EXTENDED RELEASE ORAL ONCE
Status: COMPLETED | OUTPATIENT
Start: 2024-06-18 | End: 2024-06-18

## 2024-06-18 RX ORDER — LIDOCAINE HYDROCHLORIDE 10 MG/ML
30 INJECTION, SOLUTION EPIDURAL; INFILTRATION; INTRACAUDAL; PERINEURAL ONCE
Status: COMPLETED | OUTPATIENT
Start: 2024-06-18 | End: 2024-06-18

## 2024-06-18 RX ORDER — OXYCODONE HYDROCHLORIDE 5 MG/1
5 TABLET ORAL EVERY 6 HOURS PRN
Status: DISCONTINUED | OUTPATIENT
Start: 2024-06-18 | End: 2024-06-18

## 2024-06-18 RX ORDER — OXYCODONE HYDROCHLORIDE 5 MG/1
5 TABLET ORAL EVERY 4 HOURS PRN
Qty: 15 TABLET | Refills: 0 | Status: SHIPPED | OUTPATIENT
Start: 2024-06-18 | End: 2024-06-20

## 2024-06-18 RX ORDER — IOPAMIDOL 408 MG/ML
10 INJECTION, SOLUTION INTRATHECAL ONCE
Status: COMPLETED | OUTPATIENT
Start: 2024-06-18 | End: 2024-06-18

## 2024-06-18 RX ORDER — ACETAMINOPHEN 325 MG/1
975 TABLET ORAL
Qty: 30 TABLET | Refills: 0 | Status: SHIPPED | OUTPATIENT
Start: 2024-06-18 | End: 2024-06-20

## 2024-06-18 RX ADMIN — AMLODIPINE BESYLATE 10 MG: 10 TABLET ORAL at 20:17

## 2024-06-18 RX ADMIN — ONDANSETRON 4 MG: 4 TABLET, ORALLY DISINTEGRATING ORAL at 11:51

## 2024-06-18 RX ADMIN — LEVOTHYROXINE SODIUM 125 MCG: 75 TABLET ORAL at 08:06

## 2024-06-18 RX ADMIN — BETAMETHASONE SODIUM PHOSPHATE AND BETAMETHASONE ACETATE 3 ML: 3; 3 INJECTION, SUSPENSION INTRA-ARTICULAR; INTRALESIONAL; INTRAMUSCULAR at 13:45

## 2024-06-18 RX ADMIN — ACETAMINOPHEN 975 MG: 325 TABLET, FILM COATED ORAL at 08:02

## 2024-06-18 RX ADMIN — ROPINIROLE HYDROCHLORIDE 0.25 MG: 0.25 TABLET, FILM COATED ORAL at 20:14

## 2024-06-18 RX ADMIN — ALLOPURINOL 100 MG: 100 TABLET ORAL at 20:16

## 2024-06-18 RX ADMIN — GABAPENTIN 100 MG: 100 CAPSULE ORAL at 08:07

## 2024-06-18 RX ADMIN — ACETAMINOPHEN 975 MG: 325 TABLET, FILM COATED ORAL at 16:34

## 2024-06-18 RX ADMIN — INSULIN ASPART 2 UNITS: 100 INJECTION, SOLUTION INTRAVENOUS; SUBCUTANEOUS at 17:33

## 2024-06-18 RX ADMIN — SENNOSIDES AND DOCUSATE SODIUM 2 TABLET: 50; 8.6 TABLET ORAL at 20:23

## 2024-06-18 RX ADMIN — ROPINIROLE HYDROCHLORIDE 0.25 MG: 0.25 TABLET, FILM COATED ORAL at 08:05

## 2024-06-18 RX ADMIN — POTASSIUM CHLORIDE 40 MEQ: 1500 TABLET, EXTENDED RELEASE ORAL at 10:15

## 2024-06-18 RX ADMIN — ACETAMINOPHEN, ASPIRIN, CAFFEINE 3 TABLET: 250; 65; 250 TABLET, FILM COATED ORAL at 20:15

## 2024-06-18 RX ADMIN — GABAPENTIN 100 MG: 100 CAPSULE ORAL at 20:16

## 2024-06-18 RX ADMIN — IOPAMIDOL 1 ML: 408 INJECTION, SOLUTION INTRATHECAL at 13:42

## 2024-06-18 RX ADMIN — ATORVASTATIN CALCIUM 20 MG: 20 TABLET, FILM COATED ORAL at 20:15

## 2024-06-18 RX ADMIN — POTASSIUM CHLORIDE 20 MEQ: 1500 TABLET, EXTENDED RELEASE ORAL at 11:48

## 2024-06-18 RX ADMIN — PROPRANOLOL HYDROCHLORIDE 60 MG: 20 TABLET ORAL at 20:14

## 2024-06-18 RX ADMIN — GABAPENTIN 100 MG: 100 CAPSULE ORAL at 14:35

## 2024-06-18 RX ADMIN — LISINOPRIL 20 MG: 20 TABLET ORAL at 08:07

## 2024-06-18 RX ADMIN — LIDOCAINE HYDROCHLORIDE 2 ML: 10 INJECTION, SOLUTION EPIDURAL; INFILTRATION; INTRACAUDAL; PERINEURAL at 13:46

## 2024-06-18 RX ADMIN — OXYCODONE HYDROCHLORIDE 5 MG: 5 TABLET ORAL at 11:48

## 2024-06-18 RX ADMIN — PROPRANOLOL HYDROCHLORIDE 60 MG: 20 TABLET ORAL at 08:03

## 2024-06-18 RX ADMIN — LIDOCAINE HYDROCHLORIDE 3 ML: 10 INJECTION, SOLUTION EPIDURAL; INFILTRATION; INTRACAUDAL; PERINEURAL at 13:41

## 2024-06-18 RX ADMIN — INSULIN ASPART 1 UNITS: 100 INJECTION, SOLUTION INTRAVENOUS; SUBCUTANEOUS at 08:40

## 2024-06-18 ASSESSMENT — ACTIVITIES OF DAILY LIVING (ADL)
ADLS_ACUITY_SCORE: 37
ADLS_ACUITY_SCORE: 38
ADLS_ACUITY_SCORE: 37
ADLS_ACUITY_SCORE: 38
ADLS_ACUITY_SCORE: 36
ADLS_ACUITY_SCORE: 37
ADLS_ACUITY_SCORE: 38
ADLS_ACUITY_SCORE: 37
ADLS_ACUITY_SCORE: 38
ADLS_ACUITY_SCORE: 37
ADLS_ACUITY_SCORE: 36
ADLS_ACUITY_SCORE: 38
ADLS_ACUITY_SCORE: 38
ADLS_ACUITY_SCORE: 37
ADLS_ACUITY_SCORE: 38

## 2024-06-18 NOTE — PLAN OF CARE
Observation Goals:  -diagnostic tests and consults completed and resulted: Not met   -vital signs normal or at patient baseline: Not met; HTN  -tolerating oral intake to maintain hydration: Met  Nurse to notify provider when observation goals have been met and patient is ready for discharge.

## 2024-06-18 NOTE — PROGRESS NOTES
[facility]    Medicine Progress Note - Hospitalist Service    Date of Admission:  6/17/2024    Assessment & Plan   Kyrie Wilkes is a 74 year old female admitted on 6/17/2024. She presents with low back pain and bilateral leg paresthesias.     Progressive bilateral leg paresthesias  Low back pain  Acute L3 compression fracture  Superior endplate L2 compression deformity, likely subacute  Likely acute L1 Schmorl's node  Chronic L5 Schmorl's node  L4 spondylolysis with spondylolisthesis of L4 and L5  History of sciatica status post bilateral L4-L5 laminectomy on 10/5/2022  Patient presents with bilateral leg paresthesias.  She states that it began with left-sided leg paresthesia and quickly advanced right-sided leg paresthesia.  Since that time pain in her legs has worsened.  She denies any preceding trauma.  She does have pain in her low back at the level of her pelvis and a somewhat bandlike distribution.  She denies any weakness in her legs or other bowel or bladder incontinence.  No fevers or chills.  She does not have significant midline back pain.  Unclear if this is due to her acute compression fracture.  - Observation  - Consult spine surgery, Dr. Bland's team saw patient 6/18. L4-5 ILESI while in the hospital; MILLY order and IR consult placed  -- Continue oral and IV pain control - Excedrin, gabapentin, dilaudid, oxycodone currently ordered  - PT consult pending, patient currently staying with her son because she lives alone and does not feel safe to ambulate independently      Diabetes  - Hold PTA metformin and glipizide  - Medium dose sliding scale     Gout  - Continue PTA amlodipine     Hypertension  - Continue PTA amlodipine and propranolol and lisinopril     Hypothyroidism  - Continue levothyroxine     Hyperlipidemia  - Continue PTA atorvastatin     RLS  - Continue PTA propranolol       Observation Goals: -diagnostic tests and consults completed and resulted, -vital signs normal  or at patient baseline, -tolerating oral intake to maintain hydration, Nurse to notify provider when observation goals have been met and patient is ready for discharge.  Diet: Moderate Consistent Carb (60 g CHO per Meal) Diet  Diet    DVT Prophylaxis: Pneumatic Compression Devices  Arrington Catheter: Not present  Lines: None     Cardiac Monitoring: None  Code Status: No CPR- Do NOT Intubate      Clinically Significant Risk Factors Present on Admission        # Hypokalemia: Lowest K = 2.3 mmol/L in last 2 days, will replace as needed           # Hypertension: Noted on problem list             # Obesity: Estimated body mass index is 39.06 kg/m  as calculated from the following:    Height as of this encounter: 1.524 m (5').    Weight as of this encounter: 90.7 kg (200 lb).              Disposition Plan     Medically Ready for Discharge: Anticipated Today vs tomorrow pending pain control and PT evaluation            The patient's care was discussed with the Attending Physician, Dr. Amaro, Bedside Nurse, and Patient.    KAR Zendejas [facility]  Hospitalist Service  [facility]  Securely message with [employer] (more info)  Text page via [employer] Paging/Directory   ______________________________________________________________________    Interval History   No acute events overnight, up in chair this morning. Reports 10/10 pain, pain worse on LLE > RLE.     Physical Exam   Vital Signs: Temp: 97.8  F (36.6  C) Temp src: Oral BP: (!) 150/116 Pulse: 65   Resp: 18 SpO2: 92 % O2 Device: None (Room air)    Weight: 200 lbs 0 oz    Physical Exam  Vitals and nursing note reviewed.   Constitutional:       General: She is not in acute distress.  HENT:      Mouth/Throat:      Mouth: Mucous membranes are moist.   Eyes:      Pupils: Pupils are equal, round, and reactive to light.   Cardiovascular:      Rate and Rhythm: Normal rate and regular rhythm.      Pulses: Normal pulses.      Heart sounds: Normal heart sounds.    Pulmonary:      Breath sounds: Normal breath sounds.   Abdominal:      General: There is no distension.      Palpations: Abdomen is soft.      Tenderness: There is no abdominal tenderness.   Skin:     General: Skin is warm and dry.      Capillary Refill: Capillary refill takes less than 2 seconds.   Neurological:      General: No focal deficit present.      Mental Status: She is alert and oriented to person, place, and time.           Medical Decision Making       53 MINUTES SPENT BY ME on the date of service doing chart review, history, exam, documentation & further activities per the note.      Data   Imaging results reviewed over the past 24 hrs:   Recent Results (from the past 24 hour(s))   MR Lumbar Spine w/o & w Contrast    Narrative    EXAM: MR LUMBAR SPINE W/O and W CONTRAST  LOCATION: [facility]  DATE: 6/17/2024    INDICATION: severe low back pain, bilateral radicular pain and paresthesias  COMPARISON: None.  CONTRAST: 9mL Gadavist  TECHNIQUE: Routine Lumbar Spine MRI without and with IV contrast.    FINDINGS:   Nomenclature is based on 5 lumbar type vertebral bodies. L4 spondylolysis with spondylolisthesis of L4 and L5 measuring 4 mm. Acute inferior plate of L3 compression fracture with 20% vertebral body height loss. Superior endplate L2 compression deformity   with greater than 50% vertebral body height loss with mild edema which likely represents a late subacute compression fracture. Inferior endplate Schmorl's node with edema at L1 which likely represents an acute Schmorl's node. Chronic superior plate L5   Schmorl's node. Anterolisthesis of L4 and L5 measures 4 mm. The vertebral bodies of the lumbar spine otherwise have normal stature, alignment, and marrow signal intensity. Normal distal spinal cord and cauda equina with conus medullaris at the inferior   plate of L1. The partially imaged intra-abdominal contents are unremarkable. Unremarkable visualized bony  pelvis.    T12-L1: Mild loss of disc signal and disc height. No posterior disc bulge or spinal canal narrowing. No neural foraminal narrowing.     L1-L2: Symmetric disc bulge, facet arthropathy and ligamentum flavum thickening contributing to severe spinal canal and bilateral lateral recess narrowing. Severe right and moderate left neural foraminal narrowing.    L2-L3: Symmetric disc bulge and moderate facet arthropathy with moderate spinal canal narrowing. Severe right and moderate left neural foraminal narrowing.     L3-L4: Symmetric disc bulge and moderate facet arthropathy with moderate spinal canal narrowing. Uncovertebral joint disease and facet arthropathy with severe left and moderate to severe right neural foraminal narrowing.    L4-L5: L4 spondylolysis with spondylolisthesis of L4 and L5 measuring 4 mm. Symmetric disc bulge, facet arthropathy and ligamentum flavum thickening with severe spinal canal and bilateral lateral recess narrowing. Severe bilateral neural foraminal   narrowing.    L5-S1: Symmetric disc bulge and moderate facet arthropathy without significant spinal canal narrowing. Mild bilateral neural foraminal narrowing.      Impression    IMPRESSION:  1.  Acute inferior plate of L3 compression fracture with 20% vertebral body height loss.   2.  Superior endplate L2 compression deformity with greater than 50% vertebral body height loss with mild edema which likely represents a late subacute compression fracture.   3.  Inferior endplate Schmorl's node with edema at L1 which likely represents an acute Schmorl's node.   4.  Chronic superior plate L5 Schmorl's node.   5.  L4 spondylolysis with spondylolisthesis of L4 and L5 measuring 4 mm.   6.  Degenerative lumbar spondylosis with level by level.

## 2024-06-18 NOTE — PLAN OF CARE
Admission/Transfer from: Home  2 RN skin assessment completed. Yes  Significant findings include: Reddened pravin on top of L foot, Pt believes this may have happened when getting loaded into the ambulance.  WOC Nurse Consult Ordered? No

## 2024-06-18 NOTE — PLAN OF CARE
Orthopedic Plan of Care    Lumbar spine MRI dated 6/17/24:  IMPRESSION:  1.  Acute inferior plate of L3 compression fracture with 20% vertebral body height loss.   2.  Superior endplate L2 compression deformity with greater than 50% vertebral body height loss with mild edema which likely represents a late subacute compression fracture.   3.  Inferior endplate Schmorl's node with edema at L1 which likely represents an acute Schmorl's node.   4.  Chronic superior plate L5 Schmorl's node.   5.  L4 spondylolysis with spondylolisthesis of L4 and L5 measuring 4 mm.   6.  Degenerative lumbar spondylosis with level by level.    Upon review of [facility] EHR, patient is s/p bilateral L4-5 laminotomies (DOS: 10/5/22) with Dr. Brian Bland. Per O spine patient protocol, Dr. Bland and his team should see the patient. A message was sent to Dr. Bland and Kera Allen PA-C to complete the formal consult. Ortho trauma team will otherwise sign off.     Nilam Miller PA-C  [facility]

## 2024-06-18 NOTE — PROGRESS NOTES
PRIOR TO DISCHARGE        -diagnostic tests and consults completed and resulted: not met, social work consult, PT consult  -vital signs normal or at patient baseline: met  -tolerating oral intake to maintain hydration: met   Nurse to notify provider when observation goals have been met and patient is ready for discharge.

## 2024-06-18 NOTE — CONSULTS
Orthopedic Surgery Consult / History and Physical    Kyrie Wilkes MRN# [id number]   Age: 74 year old YOB: 1950     Date of Admission:   6/17/2024  Date of Consult: 06/18/24   Location:    [facility]             Assessment and Plan:   Assessment:  74 year old female with history of chronic, spine-mediated low back and LE symptoms who presented to the ER on 6/17 with back pain and bilateral LE pain and numbness for the past 3 weeks leading to multiple falls at home. She is s/p bilateral laminotomies L4-5 performed by Dr. Bland 10/05/2022. Currently, symptom profile is multifactorial, with suspected contributions from bilateral radiculopathy vs neurogenic claudication due to multilevel central stenosis, L lateral thigh radiculopathy with L psoas and L EHL weakness (possible L3 vs L5 pattern radiculopathy), and back pain due to multiple compression fractures with varying chronicity.     Lumbar MRI is reviewed by myself and Dr. Bland.  Notable for acute compression fracture L3, subacute compression fracture L2.  Central stenosis with bilateral lateral recess stenosis at multiple levels. Multilevel foraminal narrowing. At this time, patient's radiating L lateral thigh pain is the most bothersome symptom. Discussed options with Dr. Bland and patient. Will plan for L4-5 ILESI while in the hospital. If effective symptom improvement and patient safely ambulating, could consider discharge home with close outpatient follow up in Dr. Bland's clinic. If no improvement, will discuss next steps accordingly.     For back pain due to compression fractures, could also consider vertebral augmentation, though would recommend prioritizing treatment of LE symptoms at this time. Patient aware that this could be an option in the future.      Plan:  L4-5 ILESI while in the hospital; MILLY order and IR consult placed  Continue oral and IV pain control in the meantime - Excedrin, gabapentin, dilaudid,  oxycodone currently ordered and seem reasonable  Agree with Ambulate with Assist order. May mobilize as tolerated from the spine standpoint, though safety is a concern. No bracing necessary. No spinal precautions necessary.   Recommend close outpatient follow up in Dr. Bland's clinic following discharge  In the setting of multiple compression fractures, recommend discussion with PCP, or could refer to endocrinology, for discussion of osteoporosis/bone health workup and medication options following discharge.              Chief Complaint:   Back pain and bilateral LE pain and numbness for the past 3 weeks  Multiple falls at home            History of Present Illness:   This patient is a 74 year old female with a significant past medical history of type 2 diabetes mellitus, hypertension, and hyperlipidemia who presented to the ER on 6/17 with back pain and bilateral LE pain and numbness for the past 3 weeks. Spine history is pertinent for waxing/waning low back pain with radiculopathy for many years in the setting of spinal stenosis. She is s/p bilateral laminotomies L4-5 performed by Dr. Bland 10/05/2022 in the setting of  L4-5 spondylolisthesis with severe central stenosis causing bilateral S1 radicular pain. She also had an L2 compression fracture diagnosed in November 2022, due to suspected injury while caring for her  at home.     The patient had a follow up visit scheduled with Dr. Bland in the clinic today to investigate the above symptoms, but her level of pain and decreased functionality of her legs caused her daughter in-law to bring her to the ER instead.  I spoke with the patient at bedside this morning.    She reports 3 weeks of numbness in the bilateral anterior thighs and shins with pain in the bilateral hamstrings which began with no SUZANNE.  She noticed steadily decreasing mobility and functionality of her legs leading to several falls at home and requiring her son to lift her out of bed, etc..   After multiple falls and persistent symptoms, her daughter-in-law  brought her into the ER today.    She currently reports the same symptoms as listed above, along with LBP and a sharp, severe radiating pain in the left lateral hip and lateral thigh to the knee. It does not travel distally past the knee. She finds this to be the most debilitating symptom.  She also reports weakness in her bilateral Les, most notably the LLE.  She denies any groin involvement or bowel/ bladder dysfunction.  She denies saddle anesthesia. She does have hx of restless legs for which she takes Requip.  She denies history of osteoporosis.            Past Medical History:     Past Medical History:   Diagnosis Date    Claustrophobia     with CPAP, OK with imaging    Diabetes (H)     High cholesterol     Hypertension     Restless legs syndrome (RLS)     Sleep apnea     Thyroid disease             Past Surgical History:     Past Surgical History:   Procedure Laterality Date    CATARACT IOL, RT/LT      CHOLECYSTECTOMY      HYSTERECTOMY, KIMBERLY      LAMINECTOMY LUMBAR ONE LEVEL Bilateral 10/5/2022    Procedure: bilateral lumbar 4 to lumbar 5 laminotomy;  Surgeon: Brian Bland MD;  Location: [facility] OR    OPEN REDUCTION INTERNAL FIXATION ANKLE      SALPINGO-OOPHORECTOMY BILATERAL      TUBAL LIGATION              Social History:       Social History     Tobacco Use    Smoking status: Former    Smokeless tobacco: Never    Tobacco comments:     quit 2007   Substance Use Topics    Alcohol use: Not Currently            Family History:     History reviewed. No pertinent family history.         Allergies:     Allergies   Allergen Reactions    Amoxicillin Anaphylaxis and Swelling    Penicillins Anaphylaxis and Swelling            Medications:   Anticoagulants:  per chart review, takes fish oil and Excedrin   Medications Prior to Admission   Medication Sig Dispense Refill Last Dose    albuterol (PROAIR HFA/PROVENTIL HFA/VENTOLIN HFA) 108 (90 Base) MCG/ACT inhaler  Inhale 1-2 puffs into the lungs every 4 hours as needed for shortness of breath or wheezing   PRN    allopurinol (ZYLOPRIM) 100 MG tablet Take 100 mg by mouth every evening   6/16/2024 at pm    amLODIPine (NORVASC) 10 MG tablet Take 10 mg by mouth every evening   6/16/2024 at pm    aspirin-acetaminophen-caffeine (EXCEDRIN MIGRAINE) 250-250-65 MG tablet Take 3 tablets by mouth every evening   6/16/2024 at pm    atorvastatin (LIPITOR) 20 MG tablet Take 20 mg by mouth every evening   6/16/2024 at pm    fish oil-omega-3 fatty acids 1000 MG capsule Take 1 g by mouth every evening   6/16/2024 at pm    Flaxseed, Linseed, (FLAXSEED OIL) 1400 MG CAPS Take 1 capsule by mouth every evening   6/16/2024 at pm    glipiZIDE (GLUCOTROL XL) 5 MG 24 hr tablet Take 5 mg by mouth every evening   6/16/2024 at pm    glucosamine-chondroitinoitin 750-600 MG TABS Take 1 tablet by mouth 2 times daily   6/17/2024 at am    levothyroxine (SYNTHROID/LEVOTHROID) 125 MCG tablet Take 125 mcg by mouth every morning   6/17/2024 at am    lisinopril (ZESTRIL) 20 MG tablet Take 20 mg by mouth every morning   6/17/2024 at am    metFORMIN (GLUCOPHAGE) 500 MG tablet Take 1,000 mg by mouth 2 times daily (500MG X 2 = 1,000MG)   6/17/2024 at am    Milk Thistle 175 MG TABS Take 1 tablet by mouth every evening   6/16/2024 at pm    multivitamin (CENTRUM SILVER) tablet Take 1 tablet by mouth every evening   6/16/2024 at pm    propranolol (INDERAL) 60 MG tablet Take 60 mg by mouth 2 times daily   6/17/2024 at am    rOPINIRole (REQUIP) 0.25 MG tablet Take 0.25 mg by mouth At noon as needed for restless leg   PRN    rOPINIRole (REQUIP) 0.25 MG tablet Take 0.25 mg by mouth 2 times daily   6/17/2024 at am    vitamin B-12 (CYANOCOBALAMIN) 500 MCG tablet Take 500 mcg by mouth every evening   6/16/2024 at pm    vitamin B6 (PYRIDOXINE) 100 MG tablet Take 100 mg by mouth every evening   6/16/2024 at pm    Vitamin D3 (CHOLECALCIFEROL) 25 mcg (1000 units) tablet Take 1  tablet by mouth every evening (In addition to 2000 units in morning)   6/16/2024 at pm    Vitamin D3 (CHOLECALCIFEROL) 25 mcg (1000 units) tablet Take 2 tablets by mouth every morning (In addition to 1000 units in evening)   6/17/2024 at am             Review of Systems:   A 10 point review of systems was performed, and was negative except as noted in HPI.         Physical Exam:   Patient Vitals for the past 8 hrs:   BP Temp Temp src Pulse Resp SpO2   06/18/24 1100 (!) 150/116 97.8  F (36.6  C) Oral 65 18 92 %   06/18/24 0733 121/68 98.1  F (36.7  C) Oral 64 18 94 %   06/18/24 0513 (!) 149/84 97.7  F (36.5  C) Oral 66 18 97 %       General: awake, alert, cooperative, no apparent distress, appears stated age  HEENT: normal  Respiratory: breathing non-labored  Cardiovascular: Visible skin warm and appears well-perfused.  Feet appear well-perfused.  Skin: Visible skin shows no rashes or lesions  Abdomen:  non-distended  Lymph:  No abnormal swelling of uninjured extremities  Heme:  No petechiae  Neurological: CN II-XII grossly intact to conversation   Musculoskeletal:       Spine:   Skin: Intact over lumbar spine without evidence of infection or abrasions.                TTP:   Moderately tender to palpation over the lumbar spine midline near the LS junction.  Otherwise no tenderness to palpation of the remainder of the distal thoracic spine, lumbar spine and paraspinal region.   Sensation:   Sensory disturbance to light touch  most notable in the anterior thighs, anterior shins   Motor:     R L    L3: Psoas    5  3    L4:   Quad    5  5    L4: TA   5 5    L5: EHL    5  3    S1: Eversion/PF  5  5              Data:       Imaging:   Lumbar MRI without contrast dated 6/17/2024 is reviewed independently and by Dr. Bland.  Acute compression fracture L3, subacute compression fracture L2.  Schmorl's node L1 and L5. Central stenosis with bilateral lateral recess stenosis at multiple levels. Multilevel foraminal narrowing; on  the left side, this is most notable at L3-4 and L4-5.       Kera Allen PA-C  Orthopaedic Spine Surgery  [facility]

## 2024-06-18 NOTE — PLAN OF CARE
Goal Outcome Evaluation:      Plan of Care Reviewed With: patient    PRIMARY Concern: worsening back pain, sciatica  SAFETY RISK Concerns (fall risk, behaviors, etc.): fall risk   Isolation/Type: none  Tests/Procedures for NEXT shift: none  Consults? (Pending/following, signed-off?) Social work consult, ortho following  Where is patient from? (Home, TCU, etc.): home, lives alone  Other Important info for NEXT shift: potassium protocol, significant numbness in lower extremities, return patient's home medications upon discharge  Anticipated DC date & active delays: tbd, pending improvement in symptoms (pain control)   _____________________________________________________________________________  SUMMARY NOTE:         Orientation/Cognitive: A&O x4  Observation Goals (Met/ Not Met): not met   Mobility Level/Assist Equipment: Ax1 GB/W, unable to stand at times due to pain, PT consult encouraged bed to commode for elimination at this time  Antibiotics & Plan (IV/po, length of tx left): none  Pain Management: PRN oxycodone for low back pain, scheduled tylenol   Tele/VS/O2: VSS on RA  ABNL Lab/BG: K+ 2.9, replaced per protocol, rechecked 3.8,    Diet: mod carb  Bowel/Bladder: continent  Skin Concerns: small bruise on left foot  Drains/Devices: PIV SL  Patient Stated Goal for Today: regain mobility     Observation Note:   PRIOR TO DISCHARGE        -diagnostic tests and consults completed and resulted: not met, social work consult,   -vital signs normal or at patient baseline: met  -tolerating oral intake to maintain hydration: met   Nurse to notify provider when observation goals have been met and patient is ready for discharge.      Note written by intern Radha Rausch  Attestation signed by Kylah Pringle RN 6/18/24 at 1900.

## 2024-06-18 NOTE — PLAN OF CARE
PRIMARY Concern: Low back pain/Bilat LE paresthesias  SAFETY RISK Concerns (fall risk, behaviors, etc.): not met      Isolation/Type: N/A  Tests/Procedures for NEXT shift: AM labs  Consults? (Pending/following, signed-off?) Ortho surg pending, PT  Where is patient from? (Home, TCU, etc.): Lives with son  Other Important info for NEXT shift: Pr home meds sent to pharmacy in envelope.  Anticipated DC date & active delays: TBD  _____________________________________________________________________________  SUMMARY NOTE:  Orientation/Cognitive: A&Ox4  Observation Goals (Met/ Not Met): Not met  Mobility Level/Assist Equipment: Pt not willing to ambulate overnight.  Antibiotics & Plan (IV/po, length of tx left): N/A  Pain Management: Tylenol, Gabapentin scheduled. Pt would like to avoid opioids if possible.   Tele/VS/O2: VSS on RA ex. HTN  ABNL Lab/BG: BG overnight: 264>140  MRI: Acute L3 compression fracture   Diet: Mod Carb  Bowel/Bladder: Continent   Skin Concerns:  Reddened pravin on top of L foot, Pt believes this may have happened when getting loaded into the ambulance.   Drains/Devices: PIV SL  Patient Stated Goal for Today: Pain management

## 2024-06-18 NOTE — PROVIDER NOTIFICATION
MD Notification    Notified Person: MD    Notified Person Name: Catalina Nazario    Notification Date/Time: 8:20 AM    Notification Interaction: vocera    Purpose of Notification: K+ 2. 9, no protocol ordered. Also, need SW consult    Orders Received: K+ replacement protocol ordered    Comments:

## 2024-06-18 NOTE — PROGRESS NOTES
06/18/24 1651   Appointment Info   Signing Clinician's Name / Credentials (PT) Anna Whitfield, DPAUBREY   Living Environment   People in Home alone   Current Living Arrangements apartment   Home Accessibility no concerns   Transportation Anticipated car, drives self;family or friend will provide   Living Environment Comments Pt lives alone, but past couple of days has been staying at her son's house and sleeping on the couch. Reports they have been discussing her moving in with her son, but she does not wish to do that.   Self-Care   Usual Activity Tolerance moderate   Current Activity Tolerance poor   Equipment Currently Used at Home walker, rolling;shower chair   Fall history within last six months yes   Number of times patient has fallen within last six months 1  (Pt reports she slid out of bed)   Activity/Exercise/Self-Care Comment Pt reports she uses a 4WW, [employer] for exercise. IND w/ ADLs   General Information   Onset of Illness/Injury or Date of Surgery 06/17/24   Referring Physician Kan Riggins MD   Patient/Family Therapy Goals Statement (PT) Return home   Pertinent History of Current Problem (include personal factors and/or comorbidities that impact the POC) Kyrie Wilkes is a 74 year old female admitted on 6/17/2024. She presents with low back pain and bilateral leg paresthesias   Existing Precautions/Restrictions fall   Cognition   Affect/Mental Status (Cognition) WFL   Orientation Status (Cognition) oriented x 4   Follows Commands (Cognition) WFL   Pain Assessment   Patient Currently in Pain   (2/10 in L side low back and L leg)   Integumentary/Edema   Integumentary/Edema Comments Small injection spot from MILLY   Posture    Posture Forward head position;Protracted shoulders   Range of Motion (ROM)   Range of Motion ROM deficits secondary to pain   Strength (Manual Muscle Testing)   Strength (Manual Muscle Testing) Able to perform R SLR;Able to perform L SLR;Deficits observed during  functional mobility   Bed Mobility   Comment, (Bed Mobility) Supine to sit SBA   Transfers   Comment, (Transfers) Not tested d/t pain   Gait/Stairs (Locomotion)   Comment, (Gait/Stairs) Not tested d/t pain   Balance   Balance Comments Good seated balance   Sensory Examination   Sensory Perception Comments LE light touch decreased   Clinical Impression   Criteria for Skilled Therapeutic Intervention Yes, treatment indicated   PT Diagnosis (PT) Impaired functional mobility and gait   Influenced by the following impairments Pain, weakness, decreased activity tolerance, impaired balance   Functional limitations due to impairments Limited functional mobility requiring AD and assist   Clinical Presentation (PT Evaluation Complexity) stable   Clinical Presentation Rationale Based on PMH, current status, and social support   Clinical Decision Making (Complexity) low complexity   Planned Therapy Interventions (PT) balance training;bed mobility training;gait training;strengthening;transfer training;progressive activity/exercise   Risk & Benefits of therapy have been explained evaluation/treatment results reviewed;care plan/treatment goals reviewed;risks/benefits reviewed;current/potential barriers reviewed;participants voiced agreement with care plan;participants included;patient   PT Total Evaluation Time   PT Eval, Low Complexity Minutes (25696) 10   Physical Therapy Goals   PT Frequency 5x/week   PT Predicted Duration/Target Date for Goal Attainment 06/25/24   PT Goals Bed Mobility;Transfers;Gait   PT: Bed Mobility Independent;Supine to/from sit   PT: Transfers Modified independent;Sit to/from stand;Assistive device   PT: Gait Modified independent;Assistive device;150 feet   Interventions   Interventions Quick Adds Therapeutic Activity   Therapeutic Activity   Therapeutic Activities: dynamic activities to improve functional performance Minutes (45716) 12   Symptoms Noted During/After Treatment Increased pain   Treatment  Detail/Skilled Intervention Pt in bed upon therapist arrival, agreeable to PT. Pt sitting EOB, able to shift hips forward w/ cues. Pt reporting increased pain sitting up. Pt reports pain traveling from L side to R side and then back again. Pt attempting to find comfortable position by shifting weight side to side. Pt lying back across the bed, reports this most comfortable. Pt able to come back up to sitting w/ Min A to take meds from RN. Pt reported needing to return to supine. Pt transferred to supine w/ CGA. Pt rolled side to side w/ CGA, sheets fixed. Pt total assist x2 to boost in bed. Pt in bed at end of session, all needs w/in reach, bed alarm on, RN in room.   PT Discharge Planning   PT Plan Progress bed mob, assess transfers and amb.   PT Discharge Recommendation (DC Rec) Transitional Care Facility   PT Rationale for DC Rec Pt below baseline, currently Ax1-2, very limited by pain. Pt unable to ambulate today d/t pain. Pt lives alone, discussion of staying w/ son, but will have to navigate stairs. Recommend TCU to increase strength and functional mobility.   PT Brief overview of current status Ax1-2   Total Session Time   Timed Code Treatment Minutes 12   Total Session Time (sum of timed and untimed services) 22

## 2024-06-18 NOTE — PROGRESS NOTES
MD Notification    Notified Person: MD    Notified Person Name: Dr. Smith    Notification Date/Time: 6/18/24 at 1835    Notification Interaction: AngioSlide messaging     Purpose of Notification: Pt reporting increased pain 5/10 sitting unable to complete PT/ unable to get OOB. s/p Lumbar Epidural Steroid Injection today. Managed with scheduled Acetaminophen with relief. Any other recommendations?    Orders Received: Provider added Oxycodone PRN.      Comments:

## 2024-06-18 NOTE — PROCEDURES
[facility]    Procedure: Fluoroscopic-Guided Lumbar Epidural Steroid Injection    Date/Time: 6/18/2024 1:55 PM    Performed by: Skyler Salas PA-C  Authorized by: Skyler Salas PA-C      UNIVERSAL PROTOCOL   Site Marked: Yes  Prior Images Obtained and Reviewed:  Yes  Required items: Required blood products, implants, devices and special equipment available    Patient identity confirmed:  Verbally with patient  NA - No sedation, light sedation, or local anesthesia  Confirmation Checklist:  Patient's identity using two indicators, relevant allergies, procedure was appropriate and matched the consent or emergent situation and correct equipment/implants were available  Time out: Immediately prior to the procedure a time out was called    Universal Protocol: the Joint Commission Universal Protocol was followed    Preparation: Patient was prepped and draped in usual sterile fashion    ESBL (mL):  0     ANESTHESIA    Anesthesia:  Local infiltration  Local Anesthetic:  Lidocaine 1% without epinephrine  Anesthetic Total (mL):  3      SEDATION    Patient Sedated: No    See dictated procedure note for full details.    PROCEDURE  Describe Procedure: Fluoroscopic-Guided Lumbar Epidural Steroid Injection at L5-S1 via interlaminar approach (L4-L5 was requested however due to patient's history of surgical changes at this level, L5-S1 was selected).   Patient Tolerance:  Patient tolerated the procedure well with no immediate complications  Length of time physician/provider present for 1:1 monitoring during sedation: 0

## 2024-06-18 NOTE — PROGRESS NOTES
PRIOR TO DISCHARGE        -diagnostic tests and consults completed and resulted: not met, XR lumbar injection, orthopedic surgery IP consult, social work consult, PT consult  -vital signs normal or at patient baseline: met  -tolerating oral intake to maintain hydration: met   Nurse to notify provider when observation goals have been met and patient is ready for discharge.

## 2024-06-18 NOTE — PLAN OF CARE
RECEIVING UNIT ED HANDOFF REVIEW    ED Nurse Handoff Report was reviewed by: Jose Carlos Espitia RN on June 17, 2024 at 7:36 PM

## 2024-06-19 ENCOUNTER — APPOINTMENT (OUTPATIENT)
Dept: PHYSICAL THERAPY | Facility: CLINIC | Age: 74
End: 2024-06-19
Payer: MEDICARE

## 2024-06-19 LAB
GLUCOSE BLDC GLUCOMTR-MCNC: 143 MG/DL (ref 70–99)
GLUCOSE BLDC GLUCOMTR-MCNC: 157 MG/DL (ref 70–99)
GLUCOSE BLDC GLUCOMTR-MCNC: 177 MG/DL (ref 70–99)
GLUCOSE BLDC GLUCOMTR-MCNC: 183 MG/DL (ref 70–99)
GLUCOSE BLDC GLUCOMTR-MCNC: 192 MG/DL (ref 70–99)
POTASSIUM SERPL-SCNC: 4 MMOL/L (ref 3.4–5.3)

## 2024-06-19 PROCEDURE — 82962 GLUCOSE BLOOD TEST: CPT

## 2024-06-19 PROCEDURE — 250N000013 HC RX MED GY IP 250 OP 250 PS 637: Performed by: STUDENT IN AN ORGANIZED HEALTH CARE EDUCATION/TRAINING PROGRAM

## 2024-06-19 PROCEDURE — 84132 ASSAY OF SERUM POTASSIUM: CPT | Performed by: PHYSICIAN ASSISTANT

## 2024-06-19 PROCEDURE — 36415 COLL VENOUS BLD VENIPUNCTURE: CPT | Performed by: PHYSICIAN ASSISTANT

## 2024-06-19 PROCEDURE — 99232 SBSQ HOSP IP/OBS MODERATE 35: CPT | Performed by: PHYSICIAN ASSISTANT

## 2024-06-19 PROCEDURE — 97530 THERAPEUTIC ACTIVITIES: CPT | Mod: GP

## 2024-06-19 PROCEDURE — 250N000011 HC RX IP 250 OP 636: Performed by: STUDENT IN AN ORGANIZED HEALTH CARE EDUCATION/TRAINING PROGRAM

## 2024-06-19 PROCEDURE — G0378 HOSPITAL OBSERVATION PER HR: HCPCS

## 2024-06-19 RX ADMIN — INSULIN ASPART 1 UNITS: 100 INJECTION, SOLUTION INTRAVENOUS; SUBCUTANEOUS at 09:04

## 2024-06-19 RX ADMIN — AMLODIPINE BESYLATE 10 MG: 10 TABLET ORAL at 21:36

## 2024-06-19 RX ADMIN — LEVOTHYROXINE SODIUM 125 MCG: 75 TABLET ORAL at 09:02

## 2024-06-19 RX ADMIN — GABAPENTIN 100 MG: 100 CAPSULE ORAL at 21:39

## 2024-06-19 RX ADMIN — ACETAMINOPHEN 975 MG: 325 TABLET, FILM COATED ORAL at 09:00

## 2024-06-19 RX ADMIN — INSULIN ASPART 1 UNITS: 100 INJECTION, SOLUTION INTRAVENOUS; SUBCUTANEOUS at 17:52

## 2024-06-19 RX ADMIN — GABAPENTIN 100 MG: 100 CAPSULE ORAL at 14:35

## 2024-06-19 RX ADMIN — SENNOSIDES AND DOCUSATE SODIUM 1 TABLET: 50; 8.6 TABLET ORAL at 21:39

## 2024-06-19 RX ADMIN — LISINOPRIL 20 MG: 20 TABLET ORAL at 09:04

## 2024-06-19 RX ADMIN — INSULIN ASPART 2 UNITS: 100 INJECTION, SOLUTION INTRAVENOUS; SUBCUTANEOUS at 12:37

## 2024-06-19 RX ADMIN — ATORVASTATIN CALCIUM 20 MG: 20 TABLET, FILM COATED ORAL at 21:37

## 2024-06-19 RX ADMIN — ROPINIROLE HYDROCHLORIDE 0.25 MG: 0.25 TABLET, FILM COATED ORAL at 21:37

## 2024-06-19 RX ADMIN — PROPRANOLOL HYDROCHLORIDE 60 MG: 20 TABLET ORAL at 21:36

## 2024-06-19 RX ADMIN — ROPINIROLE HYDROCHLORIDE 0.25 MG: 0.25 TABLET, FILM COATED ORAL at 09:01

## 2024-06-19 RX ADMIN — GABAPENTIN 100 MG: 100 CAPSULE ORAL at 09:00

## 2024-06-19 RX ADMIN — ACETAMINOPHEN 975 MG: 325 TABLET, FILM COATED ORAL at 14:35

## 2024-06-19 RX ADMIN — ONDANSETRON 4 MG: 4 TABLET, ORALLY DISINTEGRATING ORAL at 20:34

## 2024-06-19 RX ADMIN — PROPRANOLOL HYDROCHLORIDE 60 MG: 20 TABLET ORAL at 09:00

## 2024-06-19 RX ADMIN — ACETAMINOPHEN, ASPIRIN, CAFFEINE 3 TABLET: 250; 65; 250 TABLET, FILM COATED ORAL at 21:35

## 2024-06-19 RX ADMIN — ALLOPURINOL 100 MG: 100 TABLET ORAL at 21:37

## 2024-06-19 ASSESSMENT — ACTIVITIES OF DAILY LIVING (ADL)
ADLS_ACUITY_SCORE: 35
ADLS_ACUITY_SCORE: 40
ADLS_ACUITY_SCORE: 40
ADLS_ACUITY_SCORE: 35
ADLS_ACUITY_SCORE: 40
ADLS_ACUITY_SCORE: 42
ADLS_ACUITY_SCORE: 40
ADLS_ACUITY_SCORE: 42
ADLS_ACUITY_SCORE: 41
ADLS_ACUITY_SCORE: 35
DEPENDENT_IADLS:: INDEPENDENT
ADLS_ACUITY_SCORE: 38
ADLS_ACUITY_SCORE: 41
ADLS_ACUITY_SCORE: 41
ADLS_ACUITY_SCORE: 42
ADLS_ACUITY_SCORE: 35
ADLS_ACUITY_SCORE: 40
ADLS_ACUITY_SCORE: 36
ADLS_ACUITY_SCORE: 35
ADLS_ACUITY_SCORE: 36

## 2024-06-19 NOTE — PLAN OF CARE
PRIMARY Concern: Low back pain/Bilat LE paresthesias  SAFETY RISK Concerns (fall risk, behaviors, etc.): not met      Isolation/Type: N/A  Tests/Procedures for NEXT shift: AM labs  Consults? (Pending/following, signed-off?) Ortho surg/ Pt/ SW following  Where is patient from? (Home, TCU, etc.): Lives with son, uses wheelchair at home  Other Important info for NEXT shift: Pt not willing to ambulate overnight. Requested a PW from evening shift due to leg pain.  Anticipated DC date & active delays: TBD    SUMMARY NOTE:  Orientation/Cognitive: A&Ox4  Observation Goals (Met/ Not Met): Not met  Mobility Level/Assist Equipment: [id number]  Antibiotics & Plan (IV/po, length of tx left): N/A  Pain Management: Tylenol, Gabapentin scheduled. Pt would like to avoid opioids if possible. Denies pain scott  Tele/VS/O2: VSS on RA  ABNL Lab/BG: BG at 2AM 183   MRI: Acute L3 compression fracture   Diet: Mod Carb  Bowel/Bladder: Continent   Skin Concerns:  Reddened pravin on top of L foot, Pt believes this may have happened when getting loaded into the ambulance.   Drains/Devices: PIV SL  Patient Stated Goal for Today: Sleep    Observation goals  PRIOR TO DISCHARGE       Comments:   -diagnostic tests and consults completed and resulted: MET  -vital signs normal or at patient baseline: MET  -tolerating oral intake to maintain hydration: MET

## 2024-06-19 NOTE — PROGRESS NOTES
Observation goals  PRIOR TO DISCHARGE        Comments:   -diagnostic tests and consults completed and resulted: not met  -vital signs normal or at patient baseline: met  -tolerating oral intake to maintain hydration: met  Nurse to notify provider when observation goals have been met and patient is ready for discharge.

## 2024-06-19 NOTE — PROGRESS NOTES
[facility]    Medicine Progress Note - Hospitalist Service    Date of Admission:  6/17/2024    Assessment & Plan   Kyrie Wilkes is a 74 year old female registered to observation on 6/17/2024 with low back pain and bilateral leg paresthesias.     Progressive bilateral leg paresthesias - Improved  Low back pain - Improved  *Patient presents with bilateral leg paresthesias.  She states that over the course of the past 3 weeks her pain began with left-sided leg paresthesia and quickly advanced right-sided leg paresthesia.  Since that time pain in her legs has worsened.  She denies any preceding trauma.  She does have pain in her low back at the level of her pelvis and a somewhat bandlike distribution.  She denies any weakness in her legs or other bowel or bladder incontinence.  No fevers or chills.  She does not have significant midline back pain.  *History of sciatica status post bilateral L4-L5 laminectomy on 10/5/2022  *MR Lumbar Spine findings: Acute L3 compression fracture; Superior endplate L2 compression deformity, likely subacute; Likely acute L1 Schmorl's node; Chronic L5 Schmorl's node; L4 spondylolysis with spondylolisthesis of L4 and L5  *Spine Surgery consulted - Dr. Bland's team saw patient 6/18  - L4-5 ILESI on 6/18 which has subjectively improved the patient's discomfort  *PT consulted - TCU recommended; patient seems willing but will be out-of-pocket expense; does not feel that returning to her son's home is the best option for recovery  - Observation status  - Continue oral and IV pain control - Excedrin, gabapentin, dilaudid, oxycodone currently ordered/available  - CC/SW for disposition on TCU options     Type 2 Diabetes Mellitus  - Hold PTA metformin and glipizide  - Medium dose sliding scale    Recent Labs   Lab 06/19/24  0820 06/19/24  0200 06/18/24  2110 06/18/24  1714 06/18/24  1101 06/18/24  0735   * 183* 268* 202* 134* 149*       Chronic stable diagnoses and  other pertinent medical history: Appropriate PTA medications will be resumed. Nonessential medications will be held if patient is observation status.    Gout  Hypertension: Continue PTA Amlodipine, Propranolol, and Lisinopril  Hypothyroidism: Continue Levothyroxine  Hyperlipidemia: Continue PTA Atorvastatin  RLS: Continue PTA Ropinirole         Observation Goals: -diagnostic tests and consults completed and resulted, -vital signs normal or at patient baseline, -tolerating oral intake to maintain hydration, Nurse to notify provider when observation goals have been met and patient is ready for discharge.  Diet: Moderate Consistent Carb (60 g CHO per Meal) Diet  Diet    DVT Prophylaxis: Pneumatic Compression Devices and Ambulate every shift  Arrington Catheter: Not present  Lines: None     Cardiac Monitoring: None  Code Status: No CPR- Do NOT Intubate      Clinically Significant Risk Factors Present on Admission        # Hypokalemia: Lowest K = 2.9 mmol/L in last 2 days, will replace as needed           # Hypertension: Noted on problem list         # Obesity: Estimated body mass index is 39.06 kg/m  as calculated from the following:    Height as of this encounter: 1.524 m (5').    Weight as of this encounter: 90.7 kg (200 lb).           Disposition Plan     Medically Ready for Discharge: Anticipated Today    The patient's care was discussed with the Attending Physician, Dr. Jay MD, Minerva Eckert PA-C and Patient.    Physician Attestation   I,Minerva Eckert PA-C, was present with the medical/[place] student who participated in the service and in the documentation of the note.  I have verified the history and personally performed the physical exam and medical decision making.  I agree with the assessment and plan of care as documented in the note.      NELIA Saavedra Student  Hospitalist Service  [facility]  Securely message with Vocera (more info)  Text page via  [facility] Paging/Directory   ______________________________________________________________________    Interval History   Patient feeling much improved this morning.  She endorses that she has been up and moving around her room independently.  She was unable to walk due to pain upon admission.  She rates her pain as a 0/10 when lying in bed, and a 2-3/10 when up and moving.  She states that the back and leg discomfort is now tolerable, and feels that the steroid injection she received yesterday has been beneficial.  Discussion was had letting her know that the injections often do not provide immediate relief, but rather become more effective after a few days.  Patient would like to rehab at a TCU if possible, and understands that this will be an out of pocket expense for her.  She would prefer not to return to her son's home.  Chest pain, SOB, n/v, dizziness denied by patient.  Likely will discharge today if a TCU option is found.    Physical Exam   Vital Signs: Temp: 98.1  F (36.7  C) Temp src: Oral BP: 133/70 Pulse: 64   Resp: 16 SpO2: 95 % O2 Device: None (Room air)    Weight: 200 lbs 0 oz    Physical Exam    General: Awake, alert, sitting up in bed. Looks comfortable. No acute distress.  HEENT: Normocephalic, atraumatic. Pupils equal round and reactive to light bilaterally.   Respiratory: Clear to auscultation bilaterally, no rales, wheezing, or rhonchi.  Cardiovascular: Regular rate and rhythm, +S1 and S2, no murmur auscultated. No peripheral edema.   Gastrointestinal: Soft, non-tender, non-distended. Bowel sounds present.  Skin: Warm, dry. No obvious rashes or lesions on exposed skin. Dorsalis pedis pulses palpable bilaterally.  Musculoskeletal: No joint swelling, erythema or tenderness. Some mild discomfort noted on palpation of the lower spine and pelvis.  Neurologic: AAO x3. Normal strength and sensation.  Psychiatric: Appropriate mood and affect. No obvious anxiety or depression.      Medical Decision Making      45 MINUTES SPENT BY ME on the date of service doing chart review, history, exam, documentation & further activities per the note.      Data   ------------------------- PAST 24 HR DATA REVIEWED -----------------------------------------------

## 2024-06-19 NOTE — UTILIZATION REVIEW
Concurrent stay review; Secondary Review Determination     [facility]          Under the authority of the Utilization Management Committee, the utilization review process indicated a secondary review on the above patient.  The review outcome is based on review of the medical records, discussions with staff, and applying clinical experience noted on the date of the review.          (x) Observation Status Appropriate - Concurrent stay review    RATIONALE FOR DETERMINATION        The Patient is 75 y/o  woman,  with PMHx significant for HTN, DM, RLS, thyroid disease, LATRELL,  presents to the emergency department with approximately 2 to 3 weeks of progressive bilateral leg pain.  She reports that the pain is worse than her prior episodes of sciatica for which she underwent an L4-L5 laminectomy in 2022. She denies any muscle weakness. She has had some falls to the ground related to pain limiting her ambulation. Currently she has been staying with her son and daughter-in-law for the last 2 to 3 days due to inability to safely get around her own home. She is needing to be bodily carried from wheelchair to toilet and wheelchair to bed. She is unable to ambulate currently. She states that the pain is worse than childbirth   Lumbar spine MRI shows chronic changes and acute changes: 1.  Acute inferior plate of L3 compression fracture with 20% vertebral body height loss. 2.  Superior endplate L2 compression deformity with greater than 50% vertebral body height loss with mild edema which likely represents a late subacute compression fracture. 3.  Inferior endplate Schmorl's node with edema at L1 which likely represents an acute Schmorl's node.     Hypokalemia on admission resolved with potassium replacement.  The other electrolytes in normal range    The patient was started on Tylenol 975 mg twice a day, gabapentin 100 mg 3 times daily, the patient had epidural injection on June 18 and she reports significant  improvement in pain, today at 1-2/10.     Patient prefers to be discharged to TCU for appropriate recovery.   involved. Expected discharging to TCU tomorrow.      If no discharge tomorrow, I recommend assisted care    Patient is clinically improving and there is no clear indication to change patient's status to inpatient. The severity of illness, intensity of service provided, expected LOS and risk for adverse outcome make the care appropriate for observation.      This document was produced using voice recognition software       The information on this document is developed by the utilization review team in order for the business office to ensure compliance.  This only denotes the appropriateness of proper admission status and does not reflect the quality of care rendered.         The definitions of Inpatient Status and Observation Status used in making the determination above are those provided in the CMS Coverage Manual, Chapter 1 and Chapter 6, section 70.4.      Sincerely,     REJI CANCHOLA MD   Utilization Review  Physician Advisor  [facility]

## 2024-06-19 NOTE — PLAN OF CARE
PRIMARY Concern: low back pain & bilateral leg paresthesias   SAFETY RISK Concerns (fall risk, behaviors, etc.): fall   Isolation/Type: None   Tests/Procedures for NEXT shift: N/A   Consults? (Pending/following, signed-off?) SW following for TCU placement. PT following.   Where is patient from? (Home, TCU, etc.): home, lives alone in an apt  Other Important info for NEXT shift: SW following for TCU placement.   Anticipated DC date & active delays: potentially tomorrow  _____________________________________________________________________________  SUMMARY NOTE:    Orientation/Cognitive: A&Ox4  Observation Goals (Met/ Not Met): partially met  Mobility Level/Assist Equipment: A1, GB w/ walker  Antibiotics & Plan (IV/po, length of tx left): N/A  Pain Management: Scheduled tylenol and Gabapentin given. Pain score 1-2.   Tele/VS/O2: VSS on RA.   ABNL Lab/BG: K: 4.0, B, 192   Diet: Moderate consistent carb   Bowel/Bladder: Up to bathroom w/ A1. With improvement in pain management, pt stated she would like to use the bathroom and pause on purewick.   Skin Concerns: None   Drains/Devices:  None.   Patient Stated Goal for Today: possible discharge to a TCU.     Observation goals  PRIOR TO DISCHARGE       Comments:   -diagnostic tests and consults completed and resulted: Not met. SW consult pending for TCU placement.   -vital signs normal or at patient baseline: MET  -tolerating oral intake to maintain hydration: MET

## 2024-06-19 NOTE — PROGRESS NOTES
Observation goals  PRIOR TO DISCHARGE       Comments:   -diagnostic tests and consults completed and resulted: Not met. SW consult pending for TCU placement.   -vital signs normal or at patient baseline: MET  -tolerating oral intake to maintain hydration: MET

## 2024-06-19 NOTE — PLAN OF CARE
[id number]  PRIMARY Concern: worsening back pain   SAFETY RISK Concerns (fall risk, behaviors, etc.): fall risk       Isolation/Type: none  Tests/Procedures for NEXT shift: none  Consults? (Pending/following, signed-off?) PT/CM SW  Where is patient from? (Home, TCU, etc.): home  Other Important info for NEXT shift: patient has not had BM in 4 days, started Senna today   Anticipated DC date & active delays: TBD   _____________________________________________________________________________  SUMMARY NOTE:  Orientation/Cognitive: A&Ox4  Observation Goals (Met/ Not Met): not met  Mobility Level/Assist Equipment: patient not out of bed, requested a purewick due to pain, patient was able to adjust in bed by herself  Antibiotics & Plan (IV/po, length of tx left): n/a  Pain Management: scheduled medications for 2/10 LLE pain, denies back pain    Tele/VS/O2: VSS on RA   ABNL Lab/BG:   Diet: mod cho  Bowel/Bladder: purewick in place, PRN Senna given for constipation   Skin Concerns: bruise on left foot, steroid injection site on back is CDI   Drains/Devices: IV-SL  Patient Stated Goal for Today: pain control

## 2024-06-19 NOTE — PLAN OF CARE
Goal Outcome Evaluation:      Plan of Care Reviewed With: patient    Overall Patient Progress: improving    Observation goals  PRIOR TO DISCHARGE        Comments:   -Diagnostic tests and consults completed and resulted-Met  -Vital signs normal or at patient baseline-Met  -Tolerating oral intake to maintain hydration-Met  Nurse to notify provider when observation goals have been met and patient is ready for discharge.

## 2024-06-19 NOTE — PROGRESS NOTES
Observation goals  PRIOR TO DISCHARGE       Comments:   -diagnostic tests and consults completed and resulted: MET  -vital signs normal or at patient baseline: MET  -tolerating oral intake to maintain hydration: MET

## 2024-06-19 NOTE — CONSULTS
Care Management Initial Consult    General Information  Assessment completed with: Patient,    Type of CM/SW Visit: Initial Assessment    Primary Care Provider verified and updated as needed:     Readmission within the last 30 days:        Reason for Consult: discharge planning  Advance Care Planning: Advance Care Planning Reviewed: present on chart          Communication Assessment  Patient's communication style: spoken language (English or Bilingual)    Hearing Difficulty or Deaf: no        Cognitive  Cognitive/Neuro/Behavioral: WDL                      Living Environment:   People in home: alone     Current living Arrangements: apartment      Able to return to prior arrangements: other (see comments)  Living Arrangement Comments: PT recommending TCU    Family/Social Support:  Care provided by: self  Provides care for: no one                Description of Support System:           Current Resources:   Patient receiving home care services: No     Community Resources:    Equipment currently used at home: walker, rolling, shower chair  Supplies currently used at home:      Employment/Financial:  Employment Status: retired        Financial Concerns:             Does the patient's insurance plan have a 3 day qualifying hospital stay waiver?  No    Lifestyle & Psychosocial Needs:  Social Determinants of Health     Food Insecurity: No Food Insecurity (3/11/2024)    Received from [facility]    Food Insecurity     Worried About Running Out of Food in the Last Year: 1   Depression: Not at risk (3/14/2024)    Received from [facility]    PHQ-2     PHQ-2 TOTAL SCORE: 0   Housing Stability: Low Risk  (3/11/2024)    Received from [facility]    Housing Stability     Unable to Pay for Housing in the Last Year: 1   Tobacco Use: Medium Risk (6/17/2024)    Patient History     Smoking Tobacco Use: Former     Smokeless Tobacco Use:  Never     Passive Exposure: Not on file   Financial Resource Strain: Low Risk  (3/11/2024)    [facility]    Financial Resource Strain     Difficulty of Paying Living Expenses: 3     Difficulty of Paying Living Expenses: Not on file   Alcohol Use: Not on file   Transportation Needs: No Transportation Needs (3/11/2024)    [facility]    Transportation Needs     Lack of Transportation (Medical): 1   Physical Activity: Not on file   Interpersonal Safety: Not on file   Stress: Not on file   Social Connections: Socially Integrated (3/11/2024)    [facility]    Social Connections     Frequency of Communication with Friends and Family: 0   Health Literacy: Not on file       Functional Status:  Prior to admission patient needed assistance:   Dependent ADLs:: Ambulation-walker  Dependent IADLs:: Independent       Mental Health Status:          Chemical Dependency Status:                Values/Beliefs:  Spiritual, Cultural Beliefs, [employer] Practices, Values that affect care:                 Additional Information:  Spoke with patient for initial consult. Pt was admitted on 6/17/24 with low back pain and bilateral leg paresthesias, per H&P. Pt lives in an apartment by self at baseline (apt has an elevator)  but most recently has been staying with son/daughter in law on their couch, they have been assisting with transfers. Pt states they had a suitcase packed, ready to move into son's home. Discussed PT recommendation for TCU at discharge, pt is in agreement, understands they will be privately paying due to observation status. Discussed daily cost ranges/up front deposit costs for TCU's, therapy billed under Medicare part B and medications billed under part D. Pt agreeable, states they had to privately pay for husbands care at home. Pt would like a facility in the [facility].  Provided pt with Medicare.gov list of facilities in this area with ratings. Pt to look over list and SW to follow up after the lunch hour.     Addendum 1400: Received TCU choices from pt. Sent referrals.    YAZMIN Gordon  Social Work  [facility]

## 2024-06-20 ENCOUNTER — DOCUMENTATION ONLY (OUTPATIENT)
Dept: GERIATRICS | Facility: CLINIC | Age: 74
End: 2024-06-20
Payer: MEDICARE

## 2024-06-20 ENCOUNTER — LAB REQUISITION (OUTPATIENT)
Dept: LAB | Facility: CLINIC | Age: 74
End: 2024-06-20
Payer: MEDICARE

## 2024-06-20 VITALS
TEMPERATURE: 97.6 F | HEIGHT: 60 IN | WEIGHT: 200 LBS | RESPIRATION RATE: 18 BRPM | BODY MASS INDEX: 39.27 KG/M2 | OXYGEN SATURATION: 95 % | DIASTOLIC BLOOD PRESSURE: 78 MMHG | SYSTOLIC BLOOD PRESSURE: 143 MMHG | HEART RATE: 60 BPM

## 2024-06-20 DIAGNOSIS — Z11.1 ENCOUNTER FOR SCREENING FOR RESPIRATORY TUBERCULOSIS: ICD-10-CM

## 2024-06-20 LAB
GLUCOSE BLDC GLUCOMTR-MCNC: 133 MG/DL (ref 70–99)
GLUCOSE BLDC GLUCOMTR-MCNC: 166 MG/DL (ref 70–99)
POTASSIUM SERPL-SCNC: 3.6 MMOL/L (ref 3.4–5.3)

## 2024-06-20 PROCEDURE — 84132 ASSAY OF SERUM POTASSIUM: CPT | Performed by: PHYSICIAN ASSISTANT

## 2024-06-20 PROCEDURE — 82962 GLUCOSE BLOOD TEST: CPT

## 2024-06-20 PROCEDURE — 36415 COLL VENOUS BLD VENIPUNCTURE: CPT | Performed by: PHYSICIAN ASSISTANT

## 2024-06-20 PROCEDURE — 99239 HOSP IP/OBS DSCHRG MGMT >30: CPT | Performed by: PHYSICIAN ASSISTANT

## 2024-06-20 PROCEDURE — 250N000013 HC RX MED GY IP 250 OP 250 PS 637: Performed by: STUDENT IN AN ORGANIZED HEALTH CARE EDUCATION/TRAINING PROGRAM

## 2024-06-20 PROCEDURE — G0378 HOSPITAL OBSERVATION PER HR: HCPCS

## 2024-06-20 RX ORDER — MULTIVITAMIN WITH IRON
100 TABLET ORAL EVERY EVENING
DISCHARGE
Start: 2024-06-20

## 2024-06-20 RX ORDER — ALBUTEROL SULFATE 90 UG/1
1-2 AEROSOL, METERED RESPIRATORY (INHALATION) EVERY 4 HOURS PRN
DISCHARGE
Start: 2024-06-20

## 2024-06-20 RX ORDER — ATORVASTATIN CALCIUM 20 MG/1
20 TABLET, FILM COATED ORAL EVERY EVENING
DISCHARGE
Start: 2024-06-20

## 2024-06-20 RX ORDER — GLIPIZIDE 5 MG/1
5 TABLET, FILM COATED, EXTENDED RELEASE ORAL EVERY EVENING
DISCHARGE
Start: 2024-06-20 | End: 2024-06-22

## 2024-06-20 RX ORDER — ROPINIROLE 0.25 MG/1
0.25 TABLET, FILM COATED ORAL PRN
DISCHARGE
Start: 2024-06-20

## 2024-06-20 RX ORDER — ALLOPURINOL 100 MG/1
100 TABLET ORAL EVERY EVENING
DISCHARGE
Start: 2024-06-20

## 2024-06-20 RX ORDER — ACETAMINOPHEN 325 MG/1
975 TABLET ORAL
DISCHARGE
Start: 2024-06-20

## 2024-06-20 RX ORDER — GABAPENTIN 100 MG/1
100 CAPSULE ORAL 3 TIMES DAILY
DISCHARGE
Start: 2024-06-20

## 2024-06-20 RX ORDER — AMLODIPINE BESYLATE 10 MG/1
10 TABLET ORAL EVERY EVENING
DISCHARGE
Start: 2024-06-20

## 2024-06-20 RX ORDER — LISINOPRIL 20 MG/1
20 TABLET ORAL EVERY MORNING
DISCHARGE
Start: 2024-06-20

## 2024-06-20 RX ORDER — MULTIVIT WITH MINERALS/LUTEIN
1 TABLET ORAL EVERY EVENING
DISCHARGE
Start: 2024-06-20

## 2024-06-20 RX ORDER — ROPINIROLE 0.25 MG/1
0.25 TABLET, FILM COATED ORAL 2 TIMES DAILY
DISCHARGE
Start: 2024-06-20

## 2024-06-20 RX ORDER — VITAMIN B COMPLEX
2 TABLET ORAL EVERY MORNING
DISCHARGE
Start: 2024-06-20

## 2024-06-20 RX ORDER — PROPRANOLOL HYDROCHLORIDE 60 MG/1
60 TABLET ORAL 2 TIMES DAILY
DISCHARGE
Start: 2024-06-20

## 2024-06-20 RX ORDER — LEVOTHYROXINE SODIUM 125 UG/1
125 TABLET ORAL EVERY MORNING
DISCHARGE
Start: 2024-06-20

## 2024-06-20 RX ORDER — AMOXICILLIN 250 MG
2 CAPSULE ORAL 2 TIMES DAILY PRN
DISCHARGE
Start: 2024-06-20

## 2024-06-20 RX ORDER — VITAMIN B COMPLEX
1 TABLET ORAL EVERY EVENING
DISCHARGE
Start: 2024-06-20

## 2024-06-20 RX ORDER — LIDOCAINE 50 MG/G
1 PATCH TOPICAL EVERY 24 HOURS
DISCHARGE
Start: 2024-06-20

## 2024-06-20 RX ADMIN — GABAPENTIN 100 MG: 100 CAPSULE ORAL at 07:59

## 2024-06-20 RX ADMIN — LEVOTHYROXINE SODIUM 125 MCG: 75 TABLET ORAL at 07:58

## 2024-06-20 RX ADMIN — ROPINIROLE HYDROCHLORIDE 0.25 MG: 0.25 TABLET, FILM COATED ORAL at 07:58

## 2024-06-20 RX ADMIN — LISINOPRIL 20 MG: 20 TABLET ORAL at 07:58

## 2024-06-20 RX ADMIN — PROPRANOLOL HYDROCHLORIDE 60 MG: 20 TABLET ORAL at 07:59

## 2024-06-20 RX ADMIN — ACETAMINOPHEN 975 MG: 325 TABLET, FILM COATED ORAL at 07:58

## 2024-06-20 ASSESSMENT — ACTIVITIES OF DAILY LIVING (ADL)
ADLS_ACUITY_SCORE: 40
ADLS_ACUITY_SCORE: 44
ADLS_ACUITY_SCORE: 40
ADLS_ACUITY_SCORE: 44
ADLS_ACUITY_SCORE: 40
ADLS_ACUITY_SCORE: 44
ADLS_ACUITY_SCORE: 40

## 2024-06-20 NOTE — PROGRESS NOTES
[facility] GERIATRICS    PRIMARY CARE PROVIDER AND CLINIC:  [facility], [address] [address]  Chief Complaint   Patient presents with    Hospital F/U      [place] Medical Record Number:  [id number]  Place of Service where encounter took place:  [facility]  ([facility]) [[id number]]    Kyrie Wilkes  is a 74 year old  (1950), admitted to the above facility from  [facility]. Hospital stay 6/17/24 through 6/20/24..   HPI:    Past medical history significant for hypertension, dyslipidemia, type 2 diabetes, fatty liver, abdominal lymphadenopathy, chronic low back pain with history of L4-L5 laminectomy in 10/2022 due to neurogenic claudication due to spondylolisthesis, gout, hypothyroidism, gout , RLS, chronic daily headache, history of tobacco use, LATRELL, obesity    Summary of recent hospitalization:  Patient was hospitalized at [facility] from 6/7/2024 to 6/20/2024 for multiple acute and chronic findings including acute L3 compression fracture.  Patient presented to the emergency department for evaluation of back pain.  Laboratory evaluation in the emergency department significant for potassium 2.3, calcium 7.8, WBC 13.2.  MRI of lumbar spine revealed acute inferior endplate of L3 compression fracture with 20% vertebral body height loss, superior endplate L2 compression deformity with greater than 50% vertebral body height loss with mild edema which likely represents a late subacute compression fracture, inferior endplate Schmorl's node with edema at L1 which likely represents an acute Schmorl's node, chronic superior plate L5 Schmorl's node, L4 spondyloslysis with spondylolisthesis of L4 and L5 measuring 4mm, degenerative lumbar spondylosis.  Ortho was consulted and recommended L4-L5 MILLY which was completed on 6/18/2024.  Nonoperative management recommended at this time.  Recommend close follow-up outpatient with Dr. Bland.  Also  recommend osteoporosis workup outpatient.  Patient started on lidocaine patch.  Potassium was replaced.  WBC improved back to baseline. Discharged to TCU for physical rehabilitation and medical management.     Reviewed care everywhere and most recent PCP note, ER note, H&P, consultant notes, discharge summary from recent hospitalization.    Today patient was seen for admission visit in the TCU.  She is oriented x 3.  She reports ongoing severe back pain and pain that radiates down the back of her legs.  At time of visit pain is 5/10.  We reviewed pain regimen and she is interested in trying muscle relaxer today.  Patient does not want oxycodone or opioid available due to severe constipation she has experienced recently.  She reports that her bowels are now moving regularly.  She denies shortness of breath, chest pain, dizziness/lightheadedness, dysuria/trouble urinating.  She does have many questions today regarding financial concerns with stay in the TCU.  I did discuss this with the  who is following patient per discussion today she is agreeable to staying at least through the weekend.  Discussed with patient what to expect in the TCU.    Reviewed facility EMR including medications, recent nursing progress notes, vital signs.  Discussed plan of care with nursing.    CODE STATUS/ADVANCE DIRECTIVES DISCUSSION:  DNR / DNI  ALLERGIES:   Allergies   Allergen Reactions    Amoxicillin Anaphylaxis and Swelling    Penicillins Anaphylaxis and Swelling      PAST MEDICAL HISTORY:   Past Medical History:   Diagnosis Date    Claustrophobia     with CPAP, OK with imaging    Diabetes (H)     High cholesterol     Hypertension     Restless legs syndrome (RLS)     Sleep apnea     Thyroid disease       PAST SURGICAL HISTORY:   has a past surgical history that includes Cholecystectomy; Open reduction internal fixation ankle; tubal ligation; hysterectomy, gordy; Salpingo-oophorectomy bilateral; cataract iol, rt/lt; and  Laminectomy lumbar one level (Bilateral, 10/5/2022).  FAMILY HISTORY: family history is not on file.  SOCIAL HISTORY:   reports that she has quit smoking. She has never used smokeless tobacco. She reports that she does not currently use alcohol. She reports that she does not currently use drugs.  Patient's living condition: lives alone    Post Discharge Medication Reconciliation Status:   MED REC REQUIRED  Post Medication Reconciliation Status:  Discharge medications reconciled and changed, see notes/orders       Current Outpatient Medications   Medication Sig Dispense Refill    acetaminophen (TYLENOL) 325 MG tablet Take 3 tablets (975 mg) by mouth 2 times daily      albuterol (PROAIR HFA/PROVENTIL HFA/VENTOLIN HFA) 108 (90 Base) MCG/ACT inhaler Inhale 1-2 puffs into the lungs every 4 hours as needed for shortness of breath or wheezing      allopurinol (ZYLOPRIM) 100 MG tablet Take 1 tablet (100 mg) by mouth every evening      amLODIPine (NORVASC) 10 MG tablet Take 1 tablet (10 mg) by mouth every evening      aspirin-acetaminophen-caffeine (EXCEDRIN MIGRAINE) 250-250-65 MG tablet Take 3 tablets by mouth daily as needed for headaches      atorvastatin (LIPITOR) 20 MG tablet Take 1 tablet (20 mg) by mouth every evening      fish oil-omega-3 fatty acids 1000 MG capsule Take 1 g by mouth every evening      gabapentin (NEURONTIN) 100 MG capsule Take 1 capsule (100 mg) by mouth 3 times daily      glipiZIDE (GLUCOTROL XL) 5 MG 24 hr tablet Take 1 tablet (5 mg) by mouth daily      glucosamine-chondroitinoitin 750-600 MG TABS Take 1 tablet by mouth 2 times daily      levothyroxine (SYNTHROID/LEVOTHROID) 125 MCG tablet Take 1 tablet (125 mcg) by mouth every morning      lidocaine (LIDODERM) 5 % patch Place 1 patch onto the skin every 24 hours To prevent lidocaine toxicity, patient should be patch free for 12 hrs daily.      lisinopril (ZESTRIL) 20 MG tablet Take 1 tablet (20 mg) by mouth every morning      metFORMIN  (GLUCOPHAGE) 500 MG tablet Take 2 tablets (1,000 mg) by mouth daily (with breakfast) (500MG X 2 = 1,000MG)      methocarbamol (ROBAXIN) 500 MG tablet Take 1 tablet (500 mg) by mouth 4 times daily as needed for muscle spasms      multivitamin (CENTRUM SILVER) tablet Take 1 tablet by mouth every evening      propranolol (INDERAL) 60 MG tablet Take 1 tablet (60 mg) by mouth 2 times daily      rOPINIRole (REQUIP) 0.25 MG tablet Take 1 tablet (0.25 mg) by mouth 2 times daily      rOPINIRole (REQUIP) 0.25 MG tablet Take 1 tablet (0.25 mg) by mouth as needed (at noon PRN for restless legs) At noon as needed for restless leg      senna-docusate (SENOKOT-S/PERICOLACE) 8.6-50 MG tablet Take 2 tablets by mouth 2 times daily as needed for constipation      vitamin B-12 (CYANOCOBALAMIN) 500 MCG tablet Take 1 tablet (500 mcg) by mouth every evening      vitamin B6 (PYRIDOXINE) 100 MG tablet Take 1 tablet (100 mg) by mouth every evening      Vitamin D3 (CHOLECALCIFEROL) 25 mcg (1000 units) tablet Take 2 tablets (50 mcg) by mouth every morning (In addition to 1000 units in evening)      Vitamin D3 (CHOLECALCIFEROL) 25 mcg (1000 units) tablet Take 1 tablet (25 mcg) by mouth every evening (In addition to 2000 units in morning)      Flaxseed, Linseed, (FLAXSEED OIL) 1400 MG CAPS Take 1 capsule by mouth every evening      Milk Thistle 175 MG TABS Take 1 tablet by mouth every evening (Patient not taking: Reported on 6/21/2024)       No current facility-administered medications for this visit.       ROS:  10 point ROS of systems including Constitutional, Eyes, Respiratory, Cardiovascular, Gastroenterology, Genitourinary, Integumentary, Musculoskeletal, Psychiatric were all negative except for pertinent positives noted in my HPI.    Vitals:  /77   Pulse 79   Temp 98.9  F (37.2  C)   Resp 18   Ht 1.524 m (5')   Wt 90.7 kg (200 lb)   SpO2 95%   BMI 39.06 kg/m    Exam:  GENERAL APPEARANCE:  Alert, in NAD  HEENT: normocephalic,  moist mucous membranes, nose without drainage or crusting  RESP:  respiratory effort normal, no respiratory distress, Lung sounds clear, patient is on RA  CV: auscultation of heart done, rate and rhythm regular.   ABDOMEN: + bowel sounds, soft, nontender, no grimacing or guarding with palpation.  M/S: no lower extremity edema  SKIN:  Inspection and palpation of skin and subcutaneous tissue: skin warm, dry without rashes  NEURO: cranial nerves 2-12 grossly intact and at patient's baseline; normal speech, moves extremities freely  PSYCH: oriented x 3, affect and mood normal      Lab/Diagnostic data:  Labs [facility] in SNF [facility]. Please refer to them using EPIC/Care Everywhere. and Recent labs in EPIC reviewed by me today.     ASSESSMENT/PLAN:  Acute on chronic low back pain with bilateral leg paraesthesias  Acute L3 compression fracture  Superior endplate L2 compression deformity, likely subacute  Acute L1 Schmorl's node  Chronic L5 Schmorl's node  L4 spondylolysis with spondylolithiasis of L4 and L5  History of bilateral L4-L5 laminectomy 10/2022  Ortho was consulted (Dr. Bland- did previous back surgery) and recommended MILLY of L4-L5 with non operative management  S/p MILLY L4-L5 on 6/18/2024  Patient continues to report severe pain, rating it 5/10 with   ongoing radiation down the back of her legs  Patient does not want to take opioid due to severe constipation she experienced recently  Plan: Start methocarbamol 500 mg 4 times daily as needed.  Continue Tylenol 975 mg twice daily, Excedrin daily as needed, gabapentin 100 mg 3 times daily, lidocaine patch. Therapy below. Follow up with Dr. Bland outpatient. Follow up with PCP outpatient for osteoporosis workup.    Hypokalemia, resolved  Replaced inpatient  Potassium 3.6 on 6/20/2024  Plan: [facility] 6/24.     Essential hypertension  BP fair control- 116-171 since admission- suspect higher BP due to pain, HR 60-70s  Plan: Continue amlodipine 10 mg at bedtime,  lisinopril 20 mg daily, propranolol 60 mg twice daily.  Monitor blood pressure.    Dyslipidemia  Plan: Continue atorvastatin 20 mg at bedtime and fatty lipid 1000 mg at bedtime.    Type 2 diabetes  Hemoglobin A1c 6.4% on 6/17/2024  Blood sugars 130-212  Plan: Change glipizide XL 5 mg daily and continue metformin 1000 mg daily with breakfast. TID BS. Carb controlled diet    Gout  Plan: Continue allopurinol 100 mg at bedtime.    Hypothyroidism  TSH 0.60 on 3/14/2024  Does not appear levothyroxine dose was reduced at this time  Plan: Continue levothyroxine 125 mcg daily. TSH 6/24.    Restless leg syndrome   Plan: Continue ropinirole 0.25 mg twice daily and 0.25 mg daily as needed.    Chronic daily headache  Plan: Continue Excedrin daily as needed.  Monitor headaches.    LATRELL  Obesity, BMI 39.06  Complicates care  Plan: Encourage weight loss, dietitian follows in the TCU.  Monitor oxygen saturations while sleeping as needed.    Slow transit constipation  Bowels moving regularly now that not  Plan: Continue senna S2 tabs twice daily as needed.  Monitor bowels.    Physical deconditioning  Secondary to recent hospitalization, medical conditions as above  Plan: Encourage participation in physical therapy/occupational therapy for strengthening and deconditioning. Discharge planning per their recommendation. Social work to assist with d/c planning.      Disclaimer: This note may contain text created using speech-recognition software and may contain unintended word substitutions.        Total time spent with patient visit at the skilled nursing facility was 50 minutes including patient visit, review of past records, medication reconciliation, clarification of admission orders, discussion with nursing.    Electronically signed by:  KAR Michelle CNP

## 2024-06-20 NOTE — PLAN OF CARE
Goal Outcome Evaluation:        PRIMARY Concern: low back pain & bilateral leg paresthesias improving  SAFETY RISK Concerns (fall risk, behaviors, etc.): fall risk high  Isolation/Type: None   Tests/Procedures for NEXT shift: N/A   Consults? (Pending/following, signed-off?) SW following for, sent TCU referrals  Where is patient from? (Home, TCU, etc.): Home, lives alone in an apt  Other Important info for NEXT shift:  needs hand on when ambulating, legs might give out  Anticipated DC date & active delays: Awaits TCU acceptance, SW sent referrals    SUMMARY NOTE:  Orientation/Cognitive: A&Ox4  Observation Goals (Met/ Not Met): partially met, still needs TCU placement  Mobility Level/Assist Equipment: AX1GB w/ walker  Antibiotics & Plan (IV/po, length of tx left): N/A  Pain Management: denies pain  Tele/VS/O2: VSS on RA.   ABNL Lab/BG: None this shift, on K protocol, recheck due in AM  Diet: Mod CHO  Bowel/Bladder: Incontinent of urine at times, amb to bedside commode- pure wick at night  Skin Concerns: scattered bruising, steroid injection site on back C/D/I  Drains/Devices: No I.V access  Patient Stated Goal for Today: Rest    Observation goals  PRIOR TO DISCHARGE       Comments:   -Diagnostic tests and consults completed and resulted-Met  -Vital signs normal or at patient baseline-Met  -Tolerating oral intake to maintain hydration-Met  Nurse to notify provider when observation goals have been met and patient is ready for discharge.

## 2024-06-20 NOTE — PLAN OF CARE
Goal Outcome Evaluation:      Plan of Care Reviewed With: patient    Overall Patient Progress: improving    Observation goals  PRIOR TO DISCHARGE        Comments:   -Diagnostic tests and consults completed and resulted-Met  -Vital signs normal or at patient baseline-Met  -Tolerating oral intake to maintain hydration-Met  Nurse to notify provider when observation goals have been met and patient is ready for discharge.        PRIMARY Concern: low back pain & bilateral leg paresthesias improving  SAFETY RISK Concerns (fall risk, behaviors, etc.): fall risk high  Isolation/Type: None   Tests/Procedures for NEXT shift: N/A   Consults? (Pending/following, signed-off?) SW following for, sent TCU referrals  Where is patient from? (Home, TCU, etc.): Home, lives alone in an apt  Other Important info for NEXT shift:  needs hand on when ambulating, legs might give out  Anticipated DC date & active delays: Awaits TCU acceptance, SW sent referrals    SUMMARY NOTE:  Orientation/Cognitive: A&Ox4  Observation Goals (Met/ Not Met): partially met, still needs TCU placement  Mobility Level/Assist Equipment: AX1GB w/ walker  Antibiotics & Plan (IV/po, length of tx left): N/A  Pain Management: Scheduled Pain meds given, score pain 2-4 on lt leg and right thigh at times  Tele/VS/O2: VSS on RA.   ABNL Lab/BG: None this shift, on K protocol, recheck due in AM  Diet: Mod CHO  Bowel/Bladder: Incontinent of urine at times, amb to bathroom during the day, pure wick at night  Skin Concerns: scattered bruising, steroid injection site on back C/D/I  Drains/Devices: No I.V access  Patient Stated Goal for Today: Rest

## 2024-06-20 NOTE — PROGRESS NOTES
Care Management Discharge Note    Discharge Date: 2024       Discharge Disposition: Transitional Care    Discharge Services: Transportation Services    Discharge DME:      Discharge Transportation: [facility] wheelchair    Private pay costs discussed: transportation costs and insurance costs out of pocket expenses    Does the patient's insurance plan have a 3 day qualifying hospital stay waiver?  No    PAS Confirmation Code: [id number]  Patient/family educated on Medicare website which has current facility and service quality ratings: yes    Education Provided on the Discharge Plan:    Persons Notified of Discharge Plans: Hospitalist, HUC, Charge RN, bedside RN, pt  Patient/Family in Agreement with the Plan: yes    Handoff Referral Completed: Yes    Additional Information:  Patient has been accepted to [facility] in a private room, no fee, private pay deposit is $5,000. Updated patient, who is agreeable, states daughter in law is on her way to the hospital to drop off clothing and checkbook. Pt states they would need [facility] wheelchair transport at discharge due to family being at a  today and unable to help with a ride. Updated pt on transport costs. SW to send orders/scripts once in. Scheduled [facility] wheelchair transport for [phone]. Sent discharge orders.    PAS-RR    D: Per DHS regulation, SW completed and submitted PAS-RR to [place] [employer] Direct Connect via the Senior [employer] Line.  PAS-RR confirmation # is : [id number]    I: TOÑO spoke with pt and they are aware a PAS-RR has been submitted.  TOÑO reviewed with pt that they may be contacted for a follow up appointment within 10 days of hospital discharge if their SNF stay is < 30 days.  Contact information for Senior [employer] Line was also provided.    A: Pt verbalized understanding.    P: Further questions may be directed to Senior [employer]Age Line at #[phone], option #4 for PAS-RR staff.     YAZMIN Gordon  Social Work  M  [facility]

## 2024-06-20 NOTE — PLAN OF CARE
Goal Outcome Evaluation:     PRIMARY Concern: low back pain & bilateral leg paresthesias   SAFETY RISK Concerns (fall risk, behaviors, etc.): fall risk   Isolation/Type: None   Tests/Procedures for NEXT shift: N/A   Consults? (Pending/following, signed-off?) PT recommending TCU, SW sent referrals, ortho signed off  Where is patient from? (Home, TCU, etc.): Home, lives alone in an apt  Other Important info for NEXT shift: send home meds at discharge  Anticipated DC date & active delays: 6/20 between [id number]     SUMMARY NOTE:  Orientation/Cognitive: A&Ox4  Observation Goals (Met/ Not Met): met  Mobility Level/Assist Equipment: AX1, GB, walker  Antibiotics & Plan (IV/po, length of tx left): N/A  Pain Management: scheduled tylenol given for low back pain and LLE pain  Tele/VS/O2: VSS  ABNL Lab/BG:   Diet: mod carb  Bowel/Bladder: incontinent at times  Skin Concerns: scattered bruising  Drains/Devices: none  Patient Stated Goal for Today: discharge to TCU     Observation goals  PRIOR TO DISCHARGE       Comments:   -Diagnostic tests and consults completed and resulted- Met  -Vital signs normal or at patient baseline- Met  -Tolerating oral intake to maintain hydration- Met  Nurse to notify provider when observation goals have been met and patient is ready for discharge.

## 2024-06-20 NOTE — PLAN OF CARE
Discharge instructions and education reviewed, medication schedule and follow up appts discussed. Pt had no questions. Pt's home medications returned. All belongings sent with pt. Transported by [facility] transport to [facility].

## 2024-06-20 NOTE — PROGRESS NOTES
Observation goals  PRIOR TO DISCHARGE       Comments:   -Diagnostic tests and consults completed and resulted-Met  -Vital signs normal or at patient baseline-Met  -Tolerating oral intake to maintain hydration-Met  Nurse to notify provider when observation goals have been met and patient is ready for discharge.

## 2024-06-20 NOTE — DISCHARGE SUMMARY
[facility]  Hospitalist Discharge Summary      Date of Admission:  6/17/2024  Date of Discharge:  6/20/2024 12:47 PM  Discharging Provider: Lisa Savage PA-C  Discharge Service: Hospitalist Service    Discharge Diagnoses   Low back pain with progressive bilateral leg paresthesias s/p L4-5 ILESI (6/18/24), improving   Acute L3 compression fracture   Subacute L2 compression deformity   Acute L1 Schmorl's node   Chronic L5 Schmorl's node   L4 spondylolysis with spondylolisthesis of L4 and L5  Hx of sciatica s/p bilateral L4-5 laminectomy (10/5/22)  Type 2 Diabetes Mellitus, non-insulin dependent  Gout  Hypertension  Hypothyroidism  Hyperlipidemia  RLS    Clinically Significant Risk Factors     # Obesity: Estimated body mass index is 39.06 kg/m  as calculated from the following:    Height as of this encounter: 1.524 m (5').    Weight as of this encounter: 90.7 kg (200 lb).       Follow-ups Needed After Discharge   Follow-up Appointments     Follow Up and recommended labs and tests      Follow up with TCU provider in the next week for hospital follow-up.      Close follow up with Dr. Bland of [facility] for hospital follow-up  Brian Bland MD  [address] [id number] [phone]          Unresulted Labs Ordered in the Past 30 Days of this Admission       No orders found from 5/18/2024 to 6/18/2024.        These results will be followed up by PCP    Discharge Disposition   Discharged to home  Condition at discharge: Stable    Hospital Course   Kyrie Wilkes is a 74 year old female registered to observation on 6/17/2024 with low back pain and bilateral leg paresthesias. Pt was found to have acute L3 compression fracture, subacute L2 compression deformity, acute L1 Schmorl's node. Pt was seen by [facility] and underwent MILLY on 6/18. She was assessed by PT and was ultimately discharged to TCU.      Low back pain with progressive bilateral leg paresthesias s/p L4-5  ILESI (6/18/24), improving   Acute L3 compression fracture   Subacute L2 compression deformity   Acute L1 Schmorl's node   Chronic L5 Schmorl's node   L4 spondylolysis with spondylolisthesis of L4 and L5  Hx of sciatica s/p bilateral L4-5 laminectomy (10/5/22)  *Patient presents with bilateral leg paresthesias.  She states that over the course of the past 3 weeks her pain began with left-sided leg paresthesia and quickly advanced right-sided leg paresthesia.  Since that time pain in her legs has worsened.  She denies any preceding trauma.  She does have pain in her low back at the level of her pelvis and a somewhat bandlike distribution.  She denies any weakness in her legs or other bowel or bladder incontinence.  No fevers or chills.  She does not have significant midline back pain.  *History of sciatica status post bilateral L4-L5 laminectomy on 10/5/2022  *MR Lumbar Spine findings: Acute L3 compression fracture; Superior endplate L2 compression deformity, likely subacute; Likely acute L1 Schmorl's node; Chronic L5 Schmorl's node; L4 spondylolysis with spondylolisthesis of L4 and L5  - Spine Surgery consulted - Dr. Bland's team saw patient 6/18  - L4-5 ILESI on 6/18 which has subjectively improved the patient's discomfort  - Agree with Ambulate with Assist order. May mobilize as tolerated from the spine standpoint, though safety is a concern. No bracing necessary. No spinal precautions necessary.   - Recommend close outpatient follow up in Dr. Bland's clinic following discharge  - In the setting of multiple compression fractures, recommend discussion with PCP, or could refer to endocrinology, for discussion of osteoporosis/bone health workup and medication options following discharge.   - PT consulted, TCU recommended  - SW/CC helped with dispo planning   - Analgesic regimen at discharge with PRN tylenol, lidoderm patch, gabapentin. No narcotics at discharge per patient preference   - Bowel regimen in place     Type  2 Diabetes Mellitus, non-insulin dependent:   - PTA metformin and glipizide held during admission, resumed at discharge  - Covered with medium dose sliding scale during stay               Recent Labs   Lab 06/20/24  0732 06/20/24  0236 06/19/24  2217 06/19/24  1728 06/19/24  1128 06/19/24  0820   * 166* 143* 177* 192* 157*      Chronic stable diagnoses and other pertinent medical history:   Gout  Hypertension: Continue PTA Amlodipine, Propranolol, and Lisinopril  Hypothyroidism: Continue Levothyroxine  Hyperlipidemia: Continue PTA Atorvastatin  RLS: Continue PTA Ropinirole    Consultations This Hospital Stay   ORTHOPEDIC SURGERY IP CONSULT  PHYSICAL THERAPY ADULT IP CONSULT  CARE MANAGEMENT / SOCIAL WORK IP CONSULT  INTERVENTIONAL RADIOLOGY ADULT/PEDS IP CONSULT  PHYSICAL THERAPY ADULT IP CONSULT  OCCUPATIONAL THERAPY ADULT IP CONSULT    Code Status   Prior    Time Spent on this Encounter   ILisa PA-C, personally saw the patient today and spent greater than 30 minutes discharging this patient.    Case discussed with Dr. Thompson who agrees with the plan.        Lisa Savage PA-C  [facility] EXTENDED RECOVERY AND SHORT STAY  [address] [place] [id number]  Phone: [phone]  ______________________________________________________________________    Physical Exam   Vital Signs:                    Weight: 200 lbs 0 oz    CONSTITUTIONAL: Pt laying in bed, dressed in hospital garb. Appears comfortable. Cooperative with interview.   HEENT: Normocephalic, atraumatic.   CARDIOVASCULAR: RRR, no murmurs, rubs, or extra heart sounds appreciated. Pulses +2/4 and regular in upper and lower extremities, bilaterally.   RESPIRATORY: No increased work of breathing. CTA, bilat; no wheezes, rales, or rhonchi appreciated.  GASTROINTESTINAL:  Abdomen soft, non-distended. BS auscultated in all four quadrants. Negative for tenderness to palpation.     MUSCULOSKELETAL: Strength testing limited in bilat LE due to back pain. No gross deformities noted. Normal muscle tone.   HEMATOLOGIC/LYMPHATIC/IMMUNOLOGIC: Negative for lower extremity edema, bilaterally.  NEUROLOGIC: Alert and oriented to person, place, and time. No focal neuro deficits. Bilateral LE paresthesias. No saddles anesthesia.   SKIN: Warm, dry, intact.       Primary Care Physician   [facility]    Discharge Orders      Reason for your hospital stay    You were hospitalized for evaluation of your low back pain. You were found to have an acute L3 compression fracture. You were seen by orthopedic surgery and received an L4-5 ILESI while in the hospital.     General info for SNF    Length of Stay Estimate: Short Term Care: Estimated # of Days <30  Condition at Discharge: Stable  Level of care:skilled   Rehabilitation Potential: Good  Admission H&P remains valid and up-to-date: Yes  Recent Chemotherapy: N/A  Use Nursing Home Standing Orders: Yes     Mantoux instructions    Give two-step Mantoux (PPD) Per Facility Policy Yes     Follow Up and recommended labs and tests    Follow up with TCU provider in the next week for hospital follow-up.      Close follow up with Dr. Bland of [facility] for hospital follow-up  Brian Bland MD  [address] [id number]   [phone]     Intake and output    Every shift     Glucose monitor nursing POCT    Before meals and at bedtime     Daily weights    Call Provider for weight gain of more than 2 pounds per day or 5 pounds per week.     Activity - Up with nursing assistance     Physical Therapy Adult Consult    Evaluate and treat as clinically indicated.    Reason:  Compression fractures, acute low back pain with radiculopathy, physical deconditioning     Occupational Therapy Adult Consult    Evaluate and treat as clinically indicated.    Reason:  Compression fractures, acute low back pain with radiculopathy, physical deconditioning     Fall precautions      Diet    Follow this diet upon discharge: Orders Placed This Encounter      Moderate Consistent Carb (60 g CHO per Meal) Diet      Diet       Significant Results and Procedures   Most Recent 3 CBC's:  Recent Labs   Lab Test 06/18/24  0634 06/17/24  1052 10/06/22  0709 11/18/20  0914   WBC 8.7 13.2*  --  10.6   HGB 12.2 14.1 10.7* 12.9   MCV 89 89  --  90    241  --  322     Most Recent 3 BMP's:  Recent Labs   Lab Test 06/20/24  0742 06/20/24  0732 06/20/24  0236 06/19/24  2217 06/19/24  0820 06/19/24  0742 06/18/24  1714 06/18/24  1648 06/18/24  0735 06/18/24  0634 06/17/24  2223 06/17/24  1353 06/17/24  1052 10/06/22  0718 10/06/22  0709   NA  --   --   --   --   --   --   --   --   --  140  --   --  144  --  136   POTASSIUM 3.6  --   --   --   --  4.0  --  3.8  --  2.9*  --  3.5 2.3*   < > 3.3*   CHLORIDE  --   --   --   --   --   --   --   --   --  100  --   --  105  --  100   CO2  --   --   --   --   --   --   --   --   --  30*  --   --  25  --  31   BUN  --   --   --   --   --   --   --   --   --  18.1  --   --  21.6  --  9   CR  --   --   --   --   --   --   --   --   --  0.61  --   --  0.58  --  0.54   ANIONGAP  --   --   --   --   --   --   --   --   --  10  --   --  14  --  5   JEN  --   --   --   --   --   --   --   --   --  8.9  --  8.8 7.8*  --  9.1   GLC  --  133* 166* 143*   < >  --    < >  --    < > 146*   < >  --  130*   < > 143*    < > = values in this interval not displayed.     Most Recent 2 LFT's:  Recent Labs   Lab Test 11/18/20  0914 11/17/20  0735 11/16/20  1052   AST 62* 61* 84*   ALT 64* 63* 75*   ALKPHOS  --  77 83   BILITOTAL  --  0.5 0.7   ,   Results for orders placed or performed during the hospital encounter of 06/17/24   MR Lumbar Spine w/o & w Contrast    Narrative    EXAM: MR LUMBAR SPINE W/O and W CONTRAST  LOCATION: [facility]  DATE: 6/17/2024    INDICATION: severe low back pain, bilateral radicular pain and paresthesias  COMPARISON:  None.  CONTRAST: 9mL Gadavist  TECHNIQUE: Routine Lumbar Spine MRI without and with IV contrast.    FINDINGS:   Nomenclature is based on 5 lumbar type vertebral bodies. L4 spondylolysis with spondylolisthesis of L4 and L5 measuring 4 mm. Acute inferior plate of L3 compression fracture with 20% vertebral body height loss. Superior endplate L2 compression deformity   with greater than 50% vertebral body height loss with mild edema which likely represents a late subacute compression fracture. Inferior endplate Schmorl's node with edema at L1 which likely represents an acute Schmorl's node. Chronic superior plate L5   Schmorl's node. Anterolisthesis of L4 and L5 measures 4 mm. The vertebral bodies of the lumbar spine otherwise have normal stature, alignment, and marrow signal intensity. Normal distal spinal cord and cauda equina with conus medullaris at the inferior   plate of L1. The partially imaged intra-abdominal contents are unremarkable. Unremarkable visualized bony pelvis.    T12-L1: Mild loss of disc signal and disc height. No posterior disc bulge or spinal canal narrowing. No neural foraminal narrowing.     L1-L2: Symmetric disc bulge, facet arthropathy and ligamentum flavum thickening contributing to severe spinal canal and bilateral lateral recess narrowing. Severe right and moderate left neural foraminal narrowing.    L2-L3: Symmetric disc bulge and moderate facet arthropathy with moderate spinal canal narrowing. Severe right and moderate left neural foraminal narrowing.     L3-L4: Symmetric disc bulge and moderate facet arthropathy with moderate spinal canal narrowing. Uncovertebral joint disease and facet arthropathy with severe left and moderate to severe right neural foraminal narrowing.    L4-L5: L4 spondylolysis with spondylolisthesis of L4 and L5 measuring 4 mm. Symmetric disc bulge, facet arthropathy and ligamentum flavum thickening with severe spinal canal and bilateral lateral recess narrowing.  Severe bilateral neural foraminal   narrowing.    L5-S1: Symmetric disc bulge and moderate facet arthropathy without significant spinal canal narrowing. Mild bilateral neural foraminal narrowing.      Impression    IMPRESSION:  1.  Acute inferior plate of L3 compression fracture with 20% vertebral body height loss.   2.  Superior endplate L2 compression deformity with greater than 50% vertebral body height loss with mild edema which likely represents a late subacute compression fracture.   3.  Inferior endplate Schmorl's node with edema at L1 which likely represents an acute Schmorl's node.   4.  Chronic superior plate L5 Schmorl's node.   5.  L4 spondylolysis with spondylolisthesis of L4 and L5 measuring 4 mm.   6.  Degenerative lumbar spondylosis with level by level.   XR Lumbar Epidural Injection    Narrative    EXAM: XR LUMBAR EPIDURAL INJECTION INCL IMAGING  LOCATION: [facility]  DATE/TIME: 6/18/2024 1:57 PM    INDICATION: Multilevel spinal stenosis with LLE radiculopathy. Please  perform L4-5 ILESI.    HISTORY: 74 year-old female presenting today for a fluoroscopic-guided  lumbar epidural steroid injection, as ordered by the referring  provider. The patient has a history of current hospitalization  following low back pain with bilateral lower extremity radicular  symptoms, worse on the left side. Ordering provider requesting L4-L5  interlaminar approach for her MILLY, however, due to patient's history  of L4-L5 laminectomy, L5-S1 was selected for the injection level  today.     PROCEDURE: Prior to the procedure, the patient's pain history and  appropriate radiographic reports were reviewed. The procedure and its  risks (including but not limited to infection, bleeding, neural  injury, and reactions to contrast material and medications) were  discussed with the patient and informed consent was obtained.    The patient was placed in a prone position on the procedure table. The  L5-S1  vertebral level was localized under fluoroscopy. Next, the skin  of the patient's low back was prepped and draped in sterile fashion.  The site was marked with a sterile marker. A pre-procedural pause was  performed. A small amount of 1% lidocaine was injected into the local  soft tissues. Under fluoroscopic control, a 22-gauge needle was placed  into the epidural space by left paramedian, interlaminar approach at  L5-S1 using a loss of resistance technique. Needle tip location was  confirmed by nonionic contrast injection without evidence of vascular  uptake or intrathecal placement. Subsequently, 18 mg Celestone and 2  mL 1% Lidocaine were injected into the epidural space. The needle was  removed and a dressing was applied. The patient tolerated the  procedure well without apparent complication.     ESTIMATED BLOOD LOSS: Minimal    DAP: 68.75 uGym2  FLUOROSCOPY TIME: 0.1 minutes  IMAGES OBTAINED: 5    MEDICATIONS: 3 mL 1% Lidocaine local, 2 mL Isovue M 200,  3 mL  Celestone, 2 mL 1% Lidocaine, 1 mL Normal Saline    PAIN SCALE (0-10):  PRE-PROCEDURAL  *  Low Back: 10  *  Right Le.5   *  Left Leg: 10    POST-PROCEDURAL  *  Low Back: 0  *  Right Le   *  Left Le      Impression    IMPRESSION:   1.  Fluoroscopic-guided interlaminar lumbar epidural steroid injection  at L5-S1.    JANES MEDEL PA-C         SYSTEM ID:  [id number]       Discharge Medications   Discharge Medication List as of 2024 11:47 AM        START taking these medications    Details   lidocaine (LIDODERM) 5 % patch Place 1 patch onto the skin every 24 hours To prevent lidocaine toxicity, patient should be patch free for 12 hrs daily.Transitional           CONTINUE these medications which have CHANGED    Details   acetaminophen (TYLENOL) 325 MG tablet Take 3 tablets (975 mg) by mouth 2 times daily, Transitional      albuterol (PROAIR HFA/PROVENTIL HFA/VENTOLIN HFA) 108 (90 Base) MCG/ACT inhaler Inhale 1-2 puffs into the lungs every 4  hours as needed for shortness of breath or wheezing, TransitionalPharmacy may dispense brand covered by insurance (Proair, or proventil or ventolin or generic albuterol inhaler)      allopurinol (ZYLOPRIM) 100 MG tablet Take 1 tablet (100 mg) by mouth every evening, Transitional      amLODIPine (NORVASC) 10 MG tablet Take 1 tablet (10 mg) by mouth every evening, Transitional      aspirin-acetaminophen-caffeine (EXCEDRIN MIGRAINE) 250-250-65 MG tablet Take 3 tablets by mouth daily as needed for headaches, Transitional      atorvastatin (LIPITOR) 20 MG tablet Take 1 tablet (20 mg) by mouth every evening, Transitional      gabapentin (NEURONTIN) 100 MG capsule Take 1 capsule (100 mg) by mouth 3 times daily, Transitional      glipiZIDE (GLUCOTROL XL) 5 MG 24 hr tablet Take 1 tablet (5 mg) by mouth every evening, Transitional      levothyroxine (SYNTHROID/LEVOTHROID) 125 MCG tablet Take 1 tablet (125 mcg) by mouth every morning, Transitional      lisinopril (ZESTRIL) 20 MG tablet Take 1 tablet (20 mg) by mouth every morning, Transitional      metFORMIN (GLUCOPHAGE) 500 MG tablet Take 2 tablets (1,000 mg) by mouth daily (with breakfast) (500MG X 2 = 1,000MG), Transitional      multivitamin (CENTRUM SILVER) tablet Take 1 tablet by mouth every evening, Transitional      propranolol (INDERAL) 60 MG tablet Take 1 tablet (60 mg) by mouth 2 times daily, Transitional      !! rOPINIRole (REQUIP) 0.25 MG tablet Take 1 tablet (0.25 mg) by mouth 2 times daily, Transitional      !! rOPINIRole (REQUIP) 0.25 MG tablet Take 1 tablet (0.25 mg) by mouth as needed (at noon PRN for restless legs) At noon as needed for restless leg, Transitional      senna-docusate (SENOKOT-S/PERICOLACE) 8.6-50 MG tablet Take 2 tablets by mouth 2 times daily as needed for constipation, Transitional      vitamin B-12 (CYANOCOBALAMIN) 500 MCG tablet Take 1 tablet (500 mcg) by mouth every evening, Transitional      vitamin B6 (PYRIDOXINE) 100 MG tablet Take 1  tablet (100 mg) by mouth every evening, Transitional      !! Vitamin D3 (CHOLECALCIFEROL) 25 mcg (1000 units) tablet Take 2 tablets (50 mcg) by mouth every morning (In addition to 1000 units in evening), Transitional      !! Vitamin D3 (CHOLECALCIFEROL) 25 mcg (1000 units) tablet Take 1 tablet (25 mcg) by mouth every evening (In addition to 2000 units in morning), Transitional       !! - Potential duplicate medications found. Please discuss with provider.        CONTINUE these medications which have NOT CHANGED    Details   fish oil-omega-3 fatty acids 1000 MG capsule Take 1 g by mouth every evening, Historical      Flaxseed, Linseed, (FLAXSEED OIL) 1400 MG CAPS Take 1 capsule by mouth every evening, Historical      glucosamine-chondroitinoitin 750-600 MG TABS Take 1 tablet by mouth 2 times daily, Historical      Milk Thistle 175 MG TABS Take 1 tablet by mouth every evening, Historical           STOP taking these medications       ondansetron (ZOFRAN ODT) 4 MG ODT tab Comments:   Reason for Stopping:         oxyCODONE (ROXICODONE) 5 MG tablet Comments:   Reason for Stopping:             Allergies   Allergies   Allergen Reactions    Amoxicillin Anaphylaxis and Swelling    Penicillins Anaphylaxis and Swelling

## 2024-06-20 NOTE — PLAN OF CARE
"Physical Therapy Discharge Summary    Reason for therapy discharge:    Discharged to transitional care facility.    Progress towards therapy goal(s). See goals on Care Plan in Epic electronic health record for goal details.  Goals not met.  Barriers to achieving goals:   discharge from facility.    Therapy recommendation(s):    Continued therapy is recommended.  Rationale/Recommendations:  Per prior PT notes, \"Recommend TCU to increase strength and functional mobility.\".    Patient not seen by this therapist on this date, discharge summary written based on chart review and previous PT notes. Please see PT flowsheets for further details.          "

## 2024-06-21 ENCOUNTER — TRANSITIONAL CARE UNIT VISIT (OUTPATIENT)
Dept: GERIATRICS | Facility: CLINIC | Age: 74
End: 2024-06-21
Payer: MEDICARE

## 2024-06-21 ENCOUNTER — PATIENT OUTREACH (OUTPATIENT)
Dept: CARE COORDINATION | Facility: CLINIC | Age: 74
End: 2024-06-21

## 2024-06-21 VITALS
HEIGHT: 60 IN | SYSTOLIC BLOOD PRESSURE: 121 MMHG | DIASTOLIC BLOOD PRESSURE: 77 MMHG | HEART RATE: 79 BPM | WEIGHT: 200 LBS | TEMPERATURE: 98.9 F | OXYGEN SATURATION: 95 % | BODY MASS INDEX: 39.27 KG/M2 | RESPIRATION RATE: 18 BRPM

## 2024-06-21 DIAGNOSIS — M10.9 GOUT, UNSPECIFIED CAUSE, UNSPECIFIED CHRONICITY, UNSPECIFIED SITE: ICD-10-CM

## 2024-06-21 DIAGNOSIS — G25.81 RESTLESS LEG SYNDROME: ICD-10-CM

## 2024-06-21 DIAGNOSIS — S32.030D CLOSED COMPRESSION FRACTURE OF L3 LUMBAR VERTEBRA, WITH ROUTINE HEALING, SUBSEQUENT ENCOUNTER: ICD-10-CM

## 2024-06-21 DIAGNOSIS — R51.9 CHRONIC DAILY HEADACHE: ICD-10-CM

## 2024-06-21 DIAGNOSIS — M54.42 ACUTE BILATERAL LOW BACK PAIN WITH BILATERAL SCIATICA: Primary | ICD-10-CM

## 2024-06-21 DIAGNOSIS — E03.9 HYPOTHYROIDISM, UNSPECIFIED TYPE: ICD-10-CM

## 2024-06-21 DIAGNOSIS — G47.33 OSA (OBSTRUCTIVE SLEEP APNEA): ICD-10-CM

## 2024-06-21 DIAGNOSIS — I10 ESSENTIAL HYPERTENSION: ICD-10-CM

## 2024-06-21 DIAGNOSIS — E78.5 DYSLIPIDEMIA: ICD-10-CM

## 2024-06-21 DIAGNOSIS — R53.81 PHYSICAL DECONDITIONING: ICD-10-CM

## 2024-06-21 DIAGNOSIS — E66.01 CLASS 2 SEVERE OBESITY WITH SERIOUS COMORBIDITY AND BODY MASS INDEX (BMI) OF 39.0 TO 39.9 IN ADULT, UNSPECIFIED OBESITY TYPE (H): ICD-10-CM

## 2024-06-21 DIAGNOSIS — Z98.890 HISTORY OF LAMINECTOMY: ICD-10-CM

## 2024-06-21 DIAGNOSIS — M54.41 ACUTE BILATERAL LOW BACK PAIN WITH BILATERAL SCIATICA: Primary | ICD-10-CM

## 2024-06-21 DIAGNOSIS — K59.01 SLOW TRANSIT CONSTIPATION: ICD-10-CM

## 2024-06-21 DIAGNOSIS — E66.812 CLASS 2 SEVERE OBESITY WITH SERIOUS COMORBIDITY AND BODY MASS INDEX (BMI) OF 39.0 TO 39.9 IN ADULT, UNSPECIFIED OBESITY TYPE (H): ICD-10-CM

## 2024-06-21 DIAGNOSIS — E11.69 TYPE 2 DIABETES MELLITUS WITH OTHER SPECIFIED COMPLICATION, WITHOUT LONG-TERM CURRENT USE OF INSULIN (H): ICD-10-CM

## 2024-06-21 PROCEDURE — 36415 COLL VENOUS BLD VENIPUNCTURE: CPT | Mod: ORL

## 2024-06-21 PROCEDURE — 86481 TB AG RESPONSE T-CELL SUSP: CPT | Mod: ORL

## 2024-06-21 PROCEDURE — P9604 ONE-WAY ALLOW PRORATED TRIP: HCPCS | Mod: ORL

## 2024-06-21 PROCEDURE — 99310 SBSQ NF CARE HIGH MDM 45: CPT | Performed by: NURSE PRACTITIONER

## 2024-06-21 NOTE — PATIENT INSTRUCTIONS
Meredith Wilkes  1950  1) TSH 6/24. Diagnosis: hypothyroidism  2) Change administration time of glipizide to AM  3) Check Blood sugars KAR Alonso CNP on 6/22/2024 at 7:34 AM

## 2024-06-21 NOTE — PROGRESS NOTES
Connected [facility] Resource [facility]: [facility]    Background: Transitional Care Management program identified per system criteria and reviewed by [facility] team for possible outreach.    Assessment: Upon chart review, CCRC Team member will not proceed with patient outreach related to this episode of Transitional Care Management program due to reason below:    Non-[facility] TCU: CCRC team member noted patient discharged to TCU/ARU/LTACH. Patient is not established with a [facility] Primary Care Clinic currently supported by Primary Care-Care Coordination therefore handoff to Primary Care-Care Coordination is not appropriate at this time.    Plan: Transitional Care Management episode addressed appropriately per reason noted above.      FERNANDA Scott  [facility] [place], [facility]    *Connected Care Resource Team does NOT follow patient ongoing. Referrals are identified based on internal discharge reports and the outreach is to ensure patient has an understanding of their discharge instructions.

## 2024-06-21 NOTE — LETTER
6/21/2024      Kyrie Wilkes  [address]  [facility] [address]        [facility] GERIATRICS    PRIMARY CARE PROVIDER AND CLINIC:  [facility], [address] [facility] [address]  Chief Complaint   Patient presents with     Hospital F/U      [facility] Record Number:  [id number]  Place of Service where encounter took place:  [facility]  ([facility]) [[id number]]    Kyrie Wilkes  is a 74 year old  (1950), admitted to the above facility from  [facility]. Hospital stay 6/17/24 through 6/20/24..   HPI:    Past medical history significant for hypertension, dyslipidemia, type 2 diabetes, fatty liver, abdominal lymphadenopathy, chronic low back pain with history of L4-L5 laminectomy in 10/2022 due to neurogenic claudication due to spondylolisthesis, gout, hypothyroidism, gout , RLS, chronic daily headache, history of tobacco use, LATRELL, obesity    Summary of recent hospitalization:  Patient was hospitalized at [facility] from 6/7/2024 to 6/20/2024 for multiple acute and chronic findings including acute L3 compression fracture.  Patient presented to the emergency department for evaluation of back pain.  Laboratory evaluation in the emergency department significant for potassium 2.3, calcium 7.8, WBC 13.2.  MRI of lumbar spine revealed acute inferior endplate of L3 compression fracture with 20% vertebral body height loss, superior endplate L2 compression deformity with greater than 50% vertebral body height loss with mild edema which likely represents a late subacute compression fracture, inferior endplate Schmorl's node with edema at L1 which likely represents an acute Schmorl's node, chronic superior plate L5 Schmorl's node, L4 spondyloslysis with spondylolisthesis of L4 and L5 measuring 4mm, degenerative lumbar spondylosis.  Ortho was consulted and recommended L4-L5 MILLY which was completed on 6/18/2024.  Nonoperative management  recommended at this time.  Recommend close follow-up outpatient with Dr. Bland.  Also recommend osteoporosis workup outpatient.  Patient started on lidocaine patch.  Potassium was replaced.  WBC improved back to baseline. Discharged to TCU for physical rehabilitation and medical management.     Reviewed care everywhere and most recent PCP note, ER note, H&P, consultant notes, discharge summary from recent hospitalization.    Today patient was seen for admission visit in the TCU.  She is oriented x 3.  She reports ongoing severe back pain and pain that radiates down the back of her legs.  At time of visit pain is 5/10.  We reviewed pain regimen and she is interested in trying muscle relaxer today.  Patient does not want oxycodone or opioid available due to severe constipation she has experienced recently.  She reports that her bowels are now moving regularly.  She denies shortness of breath, chest pain, dizziness/lightheadedness, dysuria/trouble urinating.  She does have many questions today regarding financial concerns with stay in the TCU.  I did discuss this with the  who is following patient per discussion today she is agreeable to staying at least through the weekend.  Discussed with patient what to expect in the TCU.    Reviewed facility EMR including medications, recent nursing progress notes, vital signs.  Discussed plan of care with nursing.    CODE STATUS/ADVANCE DIRECTIVES DISCUSSION:  DNR / DNI  ALLERGIES:   Allergies   Allergen Reactions     Amoxicillin Anaphylaxis and Swelling     Penicillins Anaphylaxis and Swelling      PAST MEDICAL HISTORY:   Past Medical History:   Diagnosis Date     Claustrophobia     with CPAP, OK with imaging     Diabetes (H)      High cholesterol      Hypertension      Restless legs syndrome (RLS)      Sleep apnea      Thyroid disease       PAST SURGICAL HISTORY:   has a past surgical history that includes Cholecystectomy; Open reduction internal fixation ankle;  tubal ligation; hysterectomy, gordy; Salpingo-oophorectomy bilateral; cataract iol, rt/lt; and Laminectomy lumbar one level (Bilateral, 10/5/2022).  FAMILY HISTORY: family history is not on file.  SOCIAL HISTORY:   reports that she has quit smoking. She has never used smokeless tobacco. She reports that she does not currently use alcohol. She reports that she does not currently use drugs.  Patient's living condition: lives alone    Post Discharge Medication Reconciliation Status:   MED REC REQUIRED  Post Medication Reconciliation Status:  Discharge medications reconciled and changed, see notes/orders       Current Outpatient Medications   Medication Sig Dispense Refill     acetaminophen (TYLENOL) 325 MG tablet Take 3 tablets (975 mg) by mouth 2 times daily       albuterol (PROAIR HFA/PROVENTIL HFA/VENTOLIN HFA) 108 (90 Base) MCG/ACT inhaler Inhale 1-2 puffs into the lungs every 4 hours as needed for shortness of breath or wheezing       allopurinol (ZYLOPRIM) 100 MG tablet Take 1 tablet (100 mg) by mouth every evening       amLODIPine (NORVASC) 10 MG tablet Take 1 tablet (10 mg) by mouth every evening       aspirin-acetaminophen-caffeine (EXCEDRIN MIGRAINE) 250-250-65 MG tablet Take 3 tablets by mouth daily as needed for headaches       atorvastatin (LIPITOR) 20 MG tablet Take 1 tablet (20 mg) by mouth every evening       fish oil-omega-3 fatty acids 1000 MG capsule Take 1 g by mouth every evening       gabapentin (NEURONTIN) 100 MG capsule Take 1 capsule (100 mg) by mouth 3 times daily       glipiZIDE (GLUCOTROL XL) 5 MG 24 hr tablet Take 1 tablet (5 mg) by mouth daily       glucosamine-chondroitinoitin 750-600 MG TABS Take 1 tablet by mouth 2 times daily       levothyroxine (SYNTHROID/LEVOTHROID) 125 MCG tablet Take 1 tablet (125 mcg) by mouth every morning       lidocaine (LIDODERM) 5 % patch Place 1 patch onto the skin every 24 hours To prevent lidocaine toxicity, patient should be patch free for 12 hrs daily.        lisinopril (ZESTRIL) 20 MG tablet Take 1 tablet (20 mg) by mouth every morning       metFORMIN (GLUCOPHAGE) 500 MG tablet Take 2 tablets (1,000 mg) by mouth daily (with breakfast) (500MG X 2 = 1,000MG)       methocarbamol (ROBAXIN) 500 MG tablet Take 1 tablet (500 mg) by mouth 4 times daily as needed for muscle spasms       multivitamin (CENTRUM SILVER) tablet Take 1 tablet by mouth every evening       propranolol (INDERAL) 60 MG tablet Take 1 tablet (60 mg) by mouth 2 times daily       rOPINIRole (REQUIP) 0.25 MG tablet Take 1 tablet (0.25 mg) by mouth 2 times daily       rOPINIRole (REQUIP) 0.25 MG tablet Take 1 tablet (0.25 mg) by mouth as needed (at noon PRN for restless legs) At noon as needed for restless leg       senna-docusate (SENOKOT-S/PERICOLACE) 8.6-50 MG tablet Take 2 tablets by mouth 2 times daily as needed for constipation       vitamin B-12 (CYANOCOBALAMIN) 500 MCG tablet Take 1 tablet (500 mcg) by mouth every evening       vitamin B6 (PYRIDOXINE) 100 MG tablet Take 1 tablet (100 mg) by mouth every evening       Vitamin D3 (CHOLECALCIFEROL) 25 mcg (1000 units) tablet Take 2 tablets (50 mcg) by mouth every morning (In addition to 1000 units in evening)       Vitamin D3 (CHOLECALCIFEROL) 25 mcg (1000 units) tablet Take 1 tablet (25 mcg) by mouth every evening (In addition to 2000 units in morning)       Flaxseed, Linseed, (FLAXSEED OIL) 1400 MG CAPS Take 1 capsule by mouth every evening       Milk Thistle 175 MG TABS Take 1 tablet by mouth every evening (Patient not taking: Reported on 6/21/2024)       No current facility-administered medications for this visit.       ROS:  10 point ROS of systems including Constitutional, Eyes, Respiratory, Cardiovascular, Gastroenterology, Genitourinary, Integumentary, Musculoskeletal, Psychiatric were all negative except for pertinent positives noted in my HPI.    Vitals:  /77   Pulse 79   Temp 98.9  F (37.2  C)   Resp 18   Ht 1.524 m (5')   Wt 90.7  kg (200 lb)   SpO2 95%   BMI 39.06 kg/m    Exam:  GENERAL APPEARANCE:  Alert, in NAD  HEENT: normocephalic, moist mucous membranes, nose without drainage or crusting  RESP:  respiratory effort normal, no respiratory distress, Lung sounds clear, patient is on RA  CV: auscultation of heart done, rate and rhythm regular.   ABDOMEN: + bowel sounds, soft, nontender, no grimacing or guarding with palpation.  M/S: no lower extremity edema  SKIN:  Inspection and palpation of skin and subcutaneous tissue: skin warm, dry without rashes  NEURO: cranial nerves 2-12 grossly intact and at patient's baseline; normal speech, moves extremities freely  PSYCH: oriented x 3, affect and mood normal      Lab/Diagnostic data:  Labs done in SNF are in [facility]. Please refer to them using [employer]/Care Everywhere. and Recent labs in [facility] reviewed by me today.     ASSESSMENT/PLAN:  Acute on chronic low back pain with bilateral leg paraesthesias  Acute L3 compression fracture  Superior endplate L2 compression deformity, likely subacute  Acute L1 Schmorl's node  Chronic L5 Schmorl's node  L4 spondylolysis with spondylolithiasis of L4 and L5  History of bilateral L4-L5 laminectomy 10/2022  Ortho was consulted (Dr. Bland- did previous back surgery) and recommended MILLY of L4-L5 with non operative management  S/p MILLY L4-L5 on 6/18/2024  Patient continues to report severe pain, rating it 5/10 with   ongoing radiation down the back of her legs  Patient does not want to take opioid due to severe constipation she experienced recently  Plan: Start methocarbamol 500 mg 4 times daily as needed.  Continue Tylenol 975 mg twice daily, Excedrin daily as needed, gabapentin 100 mg 3 times daily, lidocaine patch. Therapy below. Follow up with Dr. Bland outpatient. Follow up with PCP outpatient for osteoporosis workup.    Hypokalemia, resolved  Replaced inpatient  Potassium 3.6 on 6/20/2024  Plan: [facility] 6/24.     Essential hypertension  BP fair control- 116-171  since admission- suspect higher BP due to pain, HR 60-70s  Plan: Continue amlodipine 10 mg at bedtime, lisinopril 20 mg daily, propranolol 60 mg twice daily.  Monitor blood pressure.    Dyslipidemia  Plan: Continue atorvastatin 20 mg at bedtime and fatty lipid 1000 mg at bedtime.    Type 2 diabetes  Hemoglobin A1c 6.4% on 6/17/2024  Blood sugars 130-212  Plan: Change glipizide XL 5 mg daily and continue metformin 1000 mg daily with breakfast. TID BS. Carb controlled diet    Gout  Plan: Continue allopurinol 100 mg at bedtime.    Hypothyroidism  TSH 0.60 on 3/14/2024  Does not appear levothyroxine dose was reduced at this time  Plan: Continue levothyroxine 125 mcg daily. TSH 6/24.    Restless leg syndrome   Plan: Continue ropinirole 0.25 mg twice daily and 0.25 mg daily as needed.    Chronic daily headache  Plan: Continue Excedrin daily as needed.  Monitor headaches.    LATERLL  Obesity, BMI 39.06  Complicates care  Plan: Encourage weight loss, dietitian follows in the TCU.  Monitor oxygen saturations while sleeping as needed.    Slow transit constipation  Bowels moving regularly now that not  Plan: Continue senna S2 tabs twice daily as needed.  Monitor bowels.    Physical deconditioning  Secondary to recent hospitalization, medical conditions as above  Plan: Encourage participation in physical therapy/occupational therapy for strengthening and deconditioning. Discharge planning per their recommendation. Social work to assist with d/c planning.      Disclaimer: This note may contain text created using speech-recognition software and may contain unintended word substitutions.        Total time spent with patient visit at the skilled nursing facility was 50 minutes including patient visit, review of past records, medication reconciliation, clarification of admission orders, discussion with nursing.    Electronically signed by:  KAR Michelle CNP                   Sincerely,        KAR Michelle CNP

## 2024-06-22 ENCOUNTER — TELEPHONE (OUTPATIENT)
Dept: GERIATRICS | Facility: CLINIC | Age: 74
End: 2024-06-22
Payer: MEDICARE

## 2024-06-22 DIAGNOSIS — M54.41 ACUTE BILATERAL LOW BACK PAIN WITH BILATERAL SCIATICA: Primary | ICD-10-CM

## 2024-06-22 DIAGNOSIS — M54.42 ACUTE BILATERAL LOW BACK PAIN WITH BILATERAL SCIATICA: Primary | ICD-10-CM

## 2024-06-22 LAB
GAMMA INTERFERON BACKGROUND BLD IA-ACNC: 0.01 IU/ML
M TB IFN-G BLD-IMP: NEGATIVE
M TB IFN-G CD4+ BCKGRND COR BLD-ACNC: 9.99 IU/ML
MITOGEN IGNF BCKGRD COR BLD-ACNC: 0 IU/ML
MITOGEN IGNF BCKGRD COR BLD-ACNC: 0 IU/ML
QUANTIFERON MITOGEN: 10 IU/ML
QUANTIFERON NIL TUBE: 0.01 IU/ML
QUANTIFERON TB1 TUBE: 0.01 IU/ML
QUANTIFERON TB2 TUBE: 0.01

## 2024-06-22 RX ORDER — GLIPIZIDE 5 MG/1
5 TABLET, FILM COATED, EXTENDED RELEASE ORAL DAILY
Status: SHIPPED
Start: 2024-06-22

## 2024-06-22 RX ORDER — METHOCARBAMOL 500 MG/1
500 TABLET, FILM COATED ORAL 4 TIMES DAILY PRN
Status: SHIPPED
Start: 2024-06-22 | End: 2024-06-24

## 2024-06-22 RX ORDER — OXYCODONE HYDROCHLORIDE 5 MG/1
5 TABLET ORAL EVERY 6 HOURS PRN
Status: SHIPPED
Start: 2024-06-22 | End: 2024-06-25

## 2024-06-22 NOTE — TELEPHONE ENCOUNTER
[facility]ERIATRIC SERVICES TELEPHONE ENCOUNTER    Chief Complaint   Patient presents with    Pain       Kyrie Wilkes is a 74 year old  (1950),Nurse called today to report: Increased reports of pain. Was previously receiving oxycodone PRN in hospital, but none at discharge    ASSESSMENT/PLAN    Restart oxycodone 5mg every 6 hours PRN    Electronically signed by:   KAR Hood CNP

## 2024-06-23 ENCOUNTER — LAB REQUISITION (OUTPATIENT)
Dept: LAB | Facility: CLINIC | Age: 74
End: 2024-06-23
Payer: MEDICARE

## 2024-06-23 DIAGNOSIS — E87.6 HYPOKALEMIA: ICD-10-CM

## 2024-06-23 DIAGNOSIS — D64.9 ANEMIA, UNSPECIFIED: ICD-10-CM

## 2024-06-24 ENCOUNTER — DISCHARGE SUMMARY NURSING HOME (OUTPATIENT)
Dept: GERIATRICS | Facility: CLINIC | Age: 74
End: 2024-06-24
Payer: MEDICARE

## 2024-06-24 ENCOUNTER — TELEPHONE (OUTPATIENT)
Dept: GERIATRICS | Facility: CLINIC | Age: 74
End: 2024-06-24

## 2024-06-24 VITALS
SYSTOLIC BLOOD PRESSURE: 137 MMHG | OXYGEN SATURATION: 95 % | HEIGHT: 60 IN | RESPIRATION RATE: 18 BRPM | TEMPERATURE: 97.5 F | BODY MASS INDEX: 37.89 KG/M2 | DIASTOLIC BLOOD PRESSURE: 83 MMHG | WEIGHT: 193 LBS | HEART RATE: 81 BPM

## 2024-06-24 DIAGNOSIS — Z98.890 HISTORY OF LAMINECTOMY: ICD-10-CM

## 2024-06-24 DIAGNOSIS — R51.9 CHRONIC DAILY HEADACHE: ICD-10-CM

## 2024-06-24 DIAGNOSIS — G47.33 OSA (OBSTRUCTIVE SLEEP APNEA): ICD-10-CM

## 2024-06-24 DIAGNOSIS — S32.030D CLOSED COMPRESSION FRACTURE OF L3 LUMBAR VERTEBRA, WITH ROUTINE HEALING, SUBSEQUENT ENCOUNTER: ICD-10-CM

## 2024-06-24 DIAGNOSIS — E66.812 CLASS 2 SEVERE OBESITY WITH SERIOUS COMORBIDITY AND BODY MASS INDEX (BMI) OF 39.0 TO 39.9 IN ADULT, UNSPECIFIED OBESITY TYPE (H): ICD-10-CM

## 2024-06-24 DIAGNOSIS — R53.81 PHYSICAL DECONDITIONING: ICD-10-CM

## 2024-06-24 DIAGNOSIS — M62.838 MUSCLE SPASM: Primary | ICD-10-CM

## 2024-06-24 DIAGNOSIS — M54.42 ACUTE BILATERAL LOW BACK PAIN WITH BILATERAL SCIATICA: Primary | ICD-10-CM

## 2024-06-24 DIAGNOSIS — E03.9 HYPOTHYROIDISM, UNSPECIFIED TYPE: ICD-10-CM

## 2024-06-24 DIAGNOSIS — M10.9 GOUT, UNSPECIFIED CAUSE, UNSPECIFIED CHRONICITY, UNSPECIFIED SITE: ICD-10-CM

## 2024-06-24 DIAGNOSIS — E78.5 DYSLIPIDEMIA: ICD-10-CM

## 2024-06-24 DIAGNOSIS — E66.01 CLASS 2 SEVERE OBESITY WITH SERIOUS COMORBIDITY AND BODY MASS INDEX (BMI) OF 39.0 TO 39.9 IN ADULT, UNSPECIFIED OBESITY TYPE (H): ICD-10-CM

## 2024-06-24 DIAGNOSIS — G25.81 RESTLESS LEGS SYNDROME (RLS): ICD-10-CM

## 2024-06-24 DIAGNOSIS — M54.41 ACUTE BILATERAL LOW BACK PAIN WITH BILATERAL SCIATICA: Primary | ICD-10-CM

## 2024-06-24 DIAGNOSIS — M43.16 SPONDYLOLISTHESIS OF LUMBAR REGION: ICD-10-CM

## 2024-06-24 DIAGNOSIS — I10 ESSENTIAL HYPERTENSION: ICD-10-CM

## 2024-06-24 DIAGNOSIS — R80.9 TYPE 2 DIABETES MELLITUS WITH MICROALBUMINURIA, WITHOUT LONG-TERM CURRENT USE OF INSULIN (H): ICD-10-CM

## 2024-06-24 DIAGNOSIS — E11.29 TYPE 2 DIABETES MELLITUS WITH MICROALBUMINURIA, WITHOUT LONG-TERM CURRENT USE OF INSULIN (H): ICD-10-CM

## 2024-06-24 DIAGNOSIS — M51.46 SCHMORL'S NODES OF LUMBAR REGION: ICD-10-CM

## 2024-06-24 LAB
ANION GAP SERPL CALCULATED.3IONS-SCNC: 14 MMOL/L (ref 7–15)
BUN SERPL-MCNC: 22.6 MG/DL (ref 8–23)
CALCIUM SERPL-MCNC: 9.1 MG/DL (ref 8.8–10.2)
CHLORIDE SERPL-SCNC: 101 MMOL/L (ref 98–107)
CREAT SERPL-MCNC: 0.75 MG/DL (ref 0.51–0.95)
DEPRECATED HCO3 PLAS-SCNC: 26 MMOL/L (ref 22–29)
EGFRCR SERPLBLD CKD-EPI 2021: 84 ML/MIN/1.73M2
ERYTHROCYTE [DISTWIDTH] IN BLOOD BY AUTOMATED COUNT: 14.5 % (ref 10–15)
GLUCOSE SERPL-MCNC: 132 MG/DL (ref 70–99)
HCT VFR BLD AUTO: 44.9 % (ref 35–47)
HGB BLD-MCNC: 14.2 G/DL (ref 11.7–15.7)
MCH RBC QN AUTO: 28.8 PG (ref 26.5–33)
MCHC RBC AUTO-ENTMCNC: 31.6 G/DL (ref 31.5–36.5)
MCV RBC AUTO: 91 FL (ref 78–100)
PLATELET # BLD AUTO: 232 10E3/UL (ref 150–450)
POTASSIUM SERPL-SCNC: 3.5 MMOL/L (ref 3.4–5.3)
RBC # BLD AUTO: 4.93 10E6/UL (ref 3.8–5.2)
SODIUM SERPL-SCNC: 141 MMOL/L (ref 135–145)
WBC # BLD AUTO: 9.2 10E3/UL (ref 4–11)

## 2024-06-24 PROCEDURE — 99316 NF DSCHRG MGMT 30 MIN+: CPT

## 2024-06-24 PROCEDURE — 85027 COMPLETE CBC AUTOMATED: CPT | Mod: ORL

## 2024-06-24 PROCEDURE — 36415 COLL VENOUS BLD VENIPUNCTURE: CPT | Mod: ORL

## 2024-06-24 PROCEDURE — 80048 BASIC METABOLIC PNL TOTAL CA: CPT | Mod: ORL

## 2024-06-24 RX ORDER — METHOCARBAMOL 500 MG/1
500 TABLET, FILM COATED ORAL 4 TIMES DAILY PRN
Status: SHIPPED
Start: 2024-06-24

## 2024-06-24 NOTE — PROGRESS NOTES
[facility] GERIATRICS DISCHARGE SUMMARY  PATIENT'S NAME: Kyrie Wilkes  YOB: 1950  MEDICAL RECORD NUMBER:  [id number]  Place of Service where encounter took place:  [facility]  ([facility]) [[id number]]    PRIMARY CARE PROVIDER AND CLINIC RESPONSIBLE AFTER TRANSFER:   [facility], [address] [address]    Non-G Provider     Transferring providers: KAR Moss CNP, Dr. Misti MD  Recent Hospitalization/ED:  [facility] Hospital stay 6/17 to 6/20/24.  Date of SNF Admission:  6/17/24  Date of SNF (anticipated) Discharge:  6/27/24  Discharged to: previous independent home  Cognitive Scores: SLUMS: 30/30  Physical Function: Ambulating up to 50 ft with FWW  DME: No DME needed    CODE STATUS/ADVANCE DIRECTIVES DISCUSSION:  Prior   ALLERGIES: Amoxicillin and Penicillins    NURSING FACILITY COURSE   By chart review, patient as admitted to [facility] 6/17-6/20/24 for multiple acute and chronic findings including L3 compression fracture after presenting with severe back pain. IN ED, labs remarkable for WBC 13.2, K+ 2.3, Ca 7.8. Mri of the lumbar spine demonstrated an acute inferior endplate L3 compression fracture, superior endplate L2 compression deformity likely ate-subacute, inferior Schmorl's note edema likely acute Schmorl's node, chronic supeior plate L5 Schmorl's node, L4 spondylolysis with spondylolisthesis of L4/L5, and degenerative lumbar spondylosis.  She was seen by ortho who recommended non-operative management, follow-up outpatient with Dr. Bland and osteoporosis follow-up. Lidocaine patch was added for pain. Leukocytosis resolved. She was recommended TCU for ongoing medical management, rehab, nursing care.     Seen today for follow-up in TCU. Patient is facilitating discharge ahead of therapy issued LCD due to payor limitations. She reports chronic diarrhea is stable. She prefers to avoid opiates. Pain is about the  same, slightly improved since presenting to the hospital.     Summary of nursing facility stay:   (M54.42,  M54.41) Acute bilateral low back pain with bilateral sciatica  (primary encounter diagnosis)  (S32.030D) Acute Closed compression fracture of L3 lumbar vertebra, with routine healing, subsequent encounter  (M51.46) Schmorl's nodes of lumbar region - subacute L1 and chronic L5  (M43.16) Spondylolisthesis of lumbar region  (Z98.890) History of laminectomy 10/2022  Comment: Ortho recommended non-operative management. Pain is slowly improving. Patient prefers to avoid oipiods  Plan: Continue methocarbamol QID PRN, tylenol 9745 mg BID, excedrin daily PRN, gabapentin 100 mg TID, lidocaine. Home care PT/OT. Follow-up with PCP outpatient for osteoporosis management     (I10) Essential hypertension  Comment: chronic, fairly controlled  BP Readings from Last 3 Encounters:   06/24/24 137/83   06/20/24 121/77   06/20/24 (!) 143/78   Plan: continue amlodipine 10 mg at bedtime, lisinopril 20 mg daily, propanolol 60 mg BID, moniotr BP    (E78.5) Dyslipidemia  Comment: chronic  Plan: continue statin, fish oil    (E11.29,  R80.9) Type 2 diabetes mellitus with microalbuminuria, without long-term current use of insulin (H)  Comment: chronic, A1C 6.4 6/17/24  Plan: Continue glipizide XL 5 mg daily, continue metformin 1000 mg daily, TID BG monitoring, diabetic diet    (M10.9) Gout, unspecified cause, unspecified chronicity, unspecified site  Comment: chronic  Plan: continue allopurinol    (E03.9) Hypothyroidism, unspecified type  Comment: chronic  -TSH 0.60 3/2024  Plan: continue synthroid      (G25.81) Restless legs syndrome (RLS)  Comment: chronic, ongoing  Plan:       Continue ropinorole 0.25 mg BID, and once daily PRN    (R51.9) Chronic daily headache  Comment: chronic  Plan: continue excedrin once daily as needed    (G47.33) LATRELL (obstructive sleep apnea)  (E66.01,  Z68.39) Class 2 severe obesity with serious comorbidity and  body mass index (BMI) of 39.0 to 39.9 in adult, unspecified obesity type (H)  Comment: chronic, not on CPAP  Plan: Monitor SPO2 PRN    (R53.81) Physical deconditioning  Comment: acute on chronic, related to acute and chronic conditions as above, recent hospitalization   Plan: Home Care Referral PT/OT/RN/HHA      Discharge Medications:  MED REC REQUIRED  Post Medication Reconciliation Status: discharge medications reconciled, continue medications without change       Current Outpatient Medications   Medication Sig Dispense Refill    acetaminophen (TYLENOL) 325 MG tablet Take 3 tablets (975 mg) by mouth 2 times daily      albuterol (PROAIR HFA/PROVENTIL HFA/VENTOLIN HFA) 108 (90 Base) MCG/ACT inhaler Inhale 1-2 puffs into the lungs every 4 hours as needed for shortness of breath or wheezing      allopurinol (ZYLOPRIM) 100 MG tablet Take 1 tablet (100 mg) by mouth every evening      amLODIPine (NORVASC) 10 MG tablet Take 1 tablet (10 mg) by mouth every evening      aspirin-acetaminophen-caffeine (EXCEDRIN MIGRAINE) 250-250-65 MG tablet Take 3 tablets by mouth daily as needed for headaches      atorvastatin (LIPITOR) 20 MG tablet Take 1 tablet (20 mg) by mouth every evening      fish oil-omega-3 fatty acids 1000 MG capsule Take 1 g by mouth every evening      Flaxseed, Linseed, (FLAXSEED OIL) 1400 MG CAPS Take 1 capsule by mouth every evening      gabapentin (NEURONTIN) 100 MG capsule Take 1 capsule (100 mg) by mouth 3 times daily      glipiZIDE (GLUCOTROL XL) 5 MG 24 hr tablet Take 1 tablet (5 mg) by mouth daily      glucosamine-chondroitinoitin 750-600 MG TABS Take 1 tablet by mouth 2 times daily      levothyroxine (SYNTHROID/LEVOTHROID) 125 MCG tablet Take 1 tablet (125 mcg) by mouth every morning      lidocaine (LIDODERM) 5 % patch Place 1 patch onto the skin every 24 hours To prevent lidocaine toxicity, patient should be patch free for 12 hrs daily.      lisinopril (ZESTRIL) 20 MG tablet Take 1 tablet (20 mg) by  mouth every morning      metFORMIN (GLUCOPHAGE) 500 MG tablet Take 2 tablets (1,000 mg) by mouth daily (with breakfast) (500MG X 2 = 1,000MG)      methocarbamol (ROBAXIN) 500 MG tablet Take 1 tablet (500 mg) by mouth 4 times daily as needed for muscle spasms      Milk Thistle 175 MG TABS Take 1 tablet by mouth every evening (Patient not taking: Reported on 6/21/2024)      multivitamin (CENTRUM SILVER) tablet Take 1 tablet by mouth every evening      oxyCODONE (ROXICODONE) 5 MG tablet Take 1 tablet (5 mg) by mouth every 6 hours as needed for pain      propranolol (INDERAL) 60 MG tablet Take 1 tablet (60 mg) by mouth 2 times daily      rOPINIRole (REQUIP) 0.25 MG tablet Take 1 tablet (0.25 mg) by mouth 2 times daily      rOPINIRole (REQUIP) 0.25 MG tablet Take 1 tablet (0.25 mg) by mouth as needed (at noon PRN for restless legs) At noon as needed for restless leg      senna-docusate (SENOKOT-S/PERICOLACE) 8.6-50 MG tablet Take 2 tablets by mouth 2 times daily as needed for constipation      vitamin B-12 (CYANOCOBALAMIN) 500 MCG tablet Take 1 tablet (500 mcg) by mouth every evening      vitamin B6 (PYRIDOXINE) 100 MG tablet Take 1 tablet (100 mg) by mouth every evening      Vitamin D3 (CHOLECALCIFEROL) 25 mcg (1000 units) tablet Take 2 tablets (50 mcg) by mouth every morning (In addition to 1000 units in evening)      Vitamin D3 (CHOLECALCIFEROL) 25 mcg (1000 units) tablet Take 1 tablet (25 mcg) by mouth every evening (In addition to 2000 units in morning)            Controlled medications:   not applicable/none     Past Medical History:   Past Medical History:   Diagnosis Date    Claustrophobia     with CPAP, OK with imaging    Diabetes (H)     High cholesterol     Hypertension     Restless legs syndrome (RLS)     Sleep apnea     Thyroid disease      Physical Exam:   Vitals: /83   Pulse 81   Temp 97.5  F (36.4  C)   Resp 18   Ht 1.524 m (5')   Wt 87.5 kg (193 lb)   SpO2 95%   BMI 37.69 kg/m    BMI: Body  mass index is 37.69 kg/m .  GENERAL APPEARANCE:  Alert, in no distress  RESP:  respiratory effort and palpation of chest normal, lungs clear to auscultation , no respiratory distress  CV:  regular rate and rhythm, no murmur, rub, or gallop, no edema  ABDOMEN:  normal bowel sounds, soft, nontender, no hepatosplenomegaly or other masses  M/S:   Gait and station abnormal transfers with assist, walker for mobility  SKIN:  Inspection of skin and subcutaneous tissue baseline, Palpation of skin and subcutaneous tissue baseline  NEURO:   simomns freely  PSYCH:  oriented X 3, affect and mood normal     SNF labs: Labs done in SNF are in [facility]. Please refer to them using [employer]Care Everywhere. and Recent labs in [facility] reviewed by me today.     DISCHARGE PLAN:  Follow up labs: No labs orders/due  Medical Follow Up:      Follow up with primary care provider in 1-2 weeks  Discharge Services: Home Care:  Occupational Therapy, Physical Therapy, Registered Nurse, and Home Health Aide  Discharge Instructions Verbalized to Patient at Discharge:   Notify your provider if you have a fever greater than 100.5 degrees.     TOTAL DISCHARGE TIME:   Greater than 30 minutes  Electronically signed by:  KAR Moss [facility]     Home care Face to Face documentation done in [facility] attached to Home care orders for [facility].

## 2024-06-24 NOTE — TELEPHONE ENCOUNTER
Prior Authorization Retail Medication Request    Medication/Dose: Methocarbamol 500MG Tablets  ICD code (if different than what is on RX):  M62.838  Previously Tried and Failed:  Oxycodone - side effect of constipation; Gabapentin; Tylenol; Lidocaine patch  Rationale:  Take 1 tablet (500 mg) by mouth 4 times daily as needed for muscle spasms    Insurance Name:  MEDICARE  Insurance ID:  [id number]     Secondary Insurance: [facility]  Insurance ID: [id number]     Pharmacy Information (if different than what is on RX)  [facility]  [facility] [address] [facility] [address]    Phone: [phone]  Fax:  [phone]

## 2024-06-24 NOTE — LETTER
6/24/2024      Kyrie Wilkes  [address]  [facility] [address]        [facility] GERIATRICS DISCHARGE SUMMARY  PATIENT'S NAME: Kyrie Wilkes  YOB: 1950  MEDICAL RECORD NUMBER:  [id number]  Place of Service where encounter took place:  [facility]  ([facility]) [[id number]]    PRIMARY CARE PROVIDER AND CLINIC RESPONSIBLE AFTER TRANSFER:   [facility], [address] [facility] [address]    Non-G Provider     Transferring providers: KAR Moss CNP, Dr. Misti MD  Recent Hospitalization/ED:  [facility] Hospital stay 6/17 to 6/20/24.  Date of SNF Admission:  6/17/24  Date of SNF (anticipated) Discharge:  6/27/24  Discharged to: previous independent home  Cognitive Scores: SLUMS: 30/30  Physical Function: Ambulating up to 50 ft with FWW  DME: No DME needed    CODE STATUS/ADVANCE DIRECTIVES DISCUSSION:  Prior   ALLERGIES: Amoxicillin and Penicillins    NURSING FACILITY COURSE   By chart review, patient as admitted to [facility] 6/17-6/20/24 for multiple acute and chronic findings including L3 compression fracture after presenting with severe back pain. IN ED, labs remarkable for WBC 13.2, K+ 2.3, Ca 7.8. Mri of the lumbar spine demonstrated an acute inferior endplate L3 compression fracture, superior endplate L2 compression deformity likely ate-subacute, inferior Schmorl's note edema likely acute Schmorl's node, chronic supeior plate L5 Schmorl's node, L4 spondylolysis with spondylolisthesis of L4/L5, and degenerative lumbar spondylosis.  She was seen by ortho who recommended non-operative management, follow-up outpatient with Dr. Bland and osteoporosis follow-up. Lidocaine patch was added for pain. Leukocytosis resolved. She was recommended TCU for ongoing medical management, rehab, nursing care.     Seen today for follow-up in TCU. Patient is facilitating discharge ahead of therapy issued LCD due to payor limitations. She reports  chronic diarrhea is stable. She prefers to avoid opiates. Pain is about the same, slightly improved since presenting to the hospital.     Summary of nursing facility stay:   (M54.42,  M54.41) Acute bilateral low back pain with bilateral sciatica  (primary encounter diagnosis)  (S32.030D) Acute Closed compression fracture of L3 lumbar vertebra, with routine healing, subsequent encounter  (M51.46) Schmorl's nodes of lumbar region - subacute L1 and chronic L5  (M43.16) Spondylolisthesis of lumbar region  (Z98.890) History of laminectomy 10/2022  Comment: Ortho recommended non-operative management. Pain is slowly improving. Patient prefers to avoid oipiods  Plan: Continue methocarbamol QID PRN, tylenol 9745 mg BID, excedrin daily PRN, gabapentin 100 mg TID, lidocaine. Home care PT/OT. Follow-up with PCP outpatient for osteoporosis management     (I10) Essential hypertension  Comment: chronic, fairly controlled  BP Readings from Last 3 Encounters:   06/24/24 137/83   06/20/24 121/77   06/20/24 (!) 143/78   Plan: continue amlodipine 10 mg at bedtime, lisinopril 20 mg daily, propanolol 60 mg BID, moniotr BP    (E78.5) Dyslipidemia  Comment: chronic  Plan: continue statin, fish oil    (E11.29,  R80.9) Type 2 diabetes mellitus with microalbuminuria, without long-term current use of insulin (H)  Comment: chronic, A1C 6.4 6/17/24  Plan: Continue glipizide XL 5 mg daily, continue metformin 1000 mg daily, TID BG monitoring, diabetic diet    (M10.9) Gout, unspecified cause, unspecified chronicity, unspecified site  Comment: chronic  Plan: continue allopurinol    (E03.9) Hypothyroidism, unspecified type  Comment: chronic  -TSH 0.60 3/2024  Plan: continue synthroid      (G25.81) Restless legs syndrome (RLS)  Comment: chronic, ongoing  Plan:       Continue ropinorole 0.25 mg BID, and once daily PRN    (R51.9) Chronic daily headache  Comment: chronic  Plan: continue excedrin once daily as needed    (G47.33) LATRELL (obstructive sleep  apnea)  (E66.01,  Z68.39) Class 2 severe obesity with serious comorbidity and body mass index (BMI) of 39.0 to 39.9 in adult, unspecified obesity type (H)  Comment: chronic, not on CPAP  Plan: Monitor SPO2 PRN    (R53.81) Physical deconditioning  Comment: acute on chronic, related to acute and chronic conditions as above, recent hospitalization   Plan: Home Care Referral PT/OT/RN/HHA      Discharge Medications:  MED REC REQUIRED  Post Medication Reconciliation Status: discharge medications reconciled, continue medications without change       Current Outpatient Medications   Medication Sig Dispense Refill     acetaminophen (TYLENOL) 325 MG tablet Take 3 tablets (975 mg) by mouth 2 times daily       albuterol (PROAIR HFA/PROVENTIL HFA/VENTOLIN HFA) 108 (90 Base) MCG/ACT inhaler Inhale 1-2 puffs into the lungs every 4 hours as needed for shortness of breath or wheezing       allopurinol (ZYLOPRIM) 100 MG tablet Take 1 tablet (100 mg) by mouth every evening       amLODIPine (NORVASC) 10 MG tablet Take 1 tablet (10 mg) by mouth every evening       aspirin-acetaminophen-caffeine (EXCEDRIN MIGRAINE) 250-250-65 MG tablet Take 3 tablets by mouth daily as needed for headaches       atorvastatin (LIPITOR) 20 MG tablet Take 1 tablet (20 mg) by mouth every evening       fish oil-omega-3 fatty acids 1000 MG capsule Take 1 g by mouth every evening       Flaxseed, Linseed, (FLAXSEED OIL) 1400 MG CAPS Take 1 capsule by mouth every evening       gabapentin (NEURONTIN) 100 MG capsule Take 1 capsule (100 mg) by mouth 3 times daily       glipiZIDE (GLUCOTROL XL) 5 MG 24 hr tablet Take 1 tablet (5 mg) by mouth daily       glucosamine-chondroitinoitin 750-600 MG TABS Take 1 tablet by mouth 2 times daily       levothyroxine (SYNTHROID/LEVOTHROID) 125 MCG tablet Take 1 tablet (125 mcg) by mouth every morning       lidocaine (LIDODERM) 5 % patch Place 1 patch onto the skin every 24 hours To prevent lidocaine toxicity, patient should be  patch free for 12 hrs daily.       lisinopril (ZESTRIL) 20 MG tablet Take 1 tablet (20 mg) by mouth every morning       metFORMIN (GLUCOPHAGE) 500 MG tablet Take 2 tablets (1,000 mg) by mouth daily (with breakfast) (500MG X 2 = 1,000MG)       methocarbamol (ROBAXIN) 500 MG tablet Take 1 tablet (500 mg) by mouth 4 times daily as needed for muscle spasms       Milk Thistle 175 MG TABS Take 1 tablet by mouth every evening (Patient not taking: Reported on 6/21/2024)       multivitamin (CENTRUM SILVER) tablet Take 1 tablet by mouth every evening       oxyCODONE (ROXICODONE) 5 MG tablet Take 1 tablet (5 mg) by mouth every 6 hours as needed for pain       propranolol (INDERAL) 60 MG tablet Take 1 tablet (60 mg) by mouth 2 times daily       rOPINIRole (REQUIP) 0.25 MG tablet Take 1 tablet (0.25 mg) by mouth 2 times daily       rOPINIRole (REQUIP) 0.25 MG tablet Take 1 tablet (0.25 mg) by mouth as needed (at noon PRN for restless legs) At noon as needed for restless leg       senna-docusate (SENOKOT-S/PERICOLACE) 8.6-50 MG tablet Take 2 tablets by mouth 2 times daily as needed for constipation       vitamin B-12 (CYANOCOBALAMIN) 500 MCG tablet Take 1 tablet (500 mcg) by mouth every evening       vitamin B6 (PYRIDOXINE) 100 MG tablet Take 1 tablet (100 mg) by mouth every evening       Vitamin D3 (CHOLECALCIFEROL) 25 mcg (1000 units) tablet Take 2 tablets (50 mcg) by mouth every morning (In addition to 1000 units in evening)       Vitamin D3 (CHOLECALCIFEROL) 25 mcg (1000 units) tablet Take 1 tablet (25 mcg) by mouth every evening (In addition to 2000 units in morning)            Controlled medications:   not applicable/none     Past Medical History:   Past Medical History:   Diagnosis Date     Claustrophobia     with CPAP, OK with imaging     Diabetes (H)      High cholesterol      Hypertension      Restless legs syndrome (RLS)      Sleep apnea      Thyroid disease      Physical Exam:   Vitals: /83   Pulse 81   Temp  97.5  F (36.4  C)   Resp 18   Ht 1.524 m (5')   Wt 87.5 kg (193 lb)   SpO2 95%   BMI 37.69 kg/m    BMI: Body mass index is 37.69 kg/m .  GENERAL APPEARANCE:  Alert, in no distress  RESP:  respiratory effort and palpation of chest normal, lungs clear to auscultation , no respiratory distress  CV:  regular rate and rhythm, no murmur, rub, or gallop, no edema  ABDOMEN:  normal bowel sounds, soft, nontender, no hepatosplenomegaly or other masses  M/S:   Gait and station abnormal transfers with assist, walker for mobility  SKIN:  Inspection of skin and subcutaneous tissue baseline, Palpation of skin and subcutaneous tissue baseline  NEURO:   simmons freely  PSYCH:  oriented X 3, affect and mood normal     SNF labs: Labs done in SNF are in [facility]. Please refer to them using [email or web address] Everywhere. and Recent labs in [facility] reviewed by me today.     DISCHARGE PLAN:  Follow up labs: No labs orders/due  Medical Follow Up:      Follow up with primary care provider in 1-2 weeks  Discharge Services: Home Care:  Occupational Therapy, Physical Therapy, Registered Nurse, and Home Health Aide  Discharge Instructions Verbalized to Patient at Discharge:   Notify your provider if you have a fever greater than 100.5 degrees.     TOTAL DISCHARGE TIME:   Greater than 30 minutes  Electronically signed by:  KAR Moss CNP     Home care Face to Face documentation done in [facility] attached to Home care orders for [facility].               Sincerely,        KAR Moss CNP

## 2024-06-25 ENCOUNTER — DOCUMENTATION ONLY (OUTPATIENT)
Dept: OTHER | Facility: CLINIC | Age: 74
End: 2024-06-25
Payer: MEDICARE

## 2024-06-27 NOTE — PATIENT INSTRUCTIONS
Kyrie Wilkes  YOB: 1950                                 SSN: [id number]  [facility] MRN#:[id number]  Medical Center Admission Date: 6/20/24 Anticipated Discharge Date: 6/27/24  PHYSICIAN's DISCHARGE SUMMARY / ORDER SHEET  1. Discharge to: previous independent home  2. Medications:      Current Outpatient Medications   Medication Sig Dispense Refill    acetaminophen (TYLENOL) 325 MG tablet Take 3 tablets (975 mg) by mouth 2 times daily      albuterol (PROAIR HFA/PROVENTIL HFA/VENTOLIN HFA) 108 (90 Base) MCG/ACT inhaler Inhale 1-2 puffs into the lungs every 4 hours as needed for shortness of breath or wheezing      allopurinol (ZYLOPRIM) 100 MG tablet Take 1 tablet (100 mg) by mouth every evening      amLODIPine (NORVASC) 10 MG tablet Take 1 tablet (10 mg) by mouth every evening      aspirin-acetaminophen-caffeine (EXCEDRIN MIGRAINE) 250-250-65 MG tablet Take 3 tablets by mouth daily as needed for headaches      atorvastatin (LIPITOR) 20 MG tablet Take 1 tablet (20 mg) by mouth every evening      fish oil-omega-3 fatty acids 1000 MG capsule Take 1 g by mouth every evening      Flaxseed, Linseed, (FLAXSEED OIL) 1400 MG CAPS Take 1 capsule by mouth every evening      gabapentin (NEURONTIN) 100 MG capsule Take 1 capsule (100 mg) by mouth 3 times daily      glipiZIDE (GLUCOTROL XL) 5 MG 24 hr tablet Take 1 tablet (5 mg) by mouth daily      glucosamine-chondroitinoitin 750-600 MG TABS Take 1 tablet by mouth 2 times daily      levothyroxine (SYNTHROID/LEVOTHROID) 125 MCG tablet Take 1 tablet (125 mcg) by mouth every morning      lidocaine (LIDODERM) 5 % patch Place 1 patch onto the skin every 24 hours To prevent lidocaine toxicity, patient should be patch free for 12 hrs daily.      lisinopril (ZESTRIL) 20 MG tablet Take 1 tablet (20 mg) by mouth every morning      metFORMIN (GLUCOPHAGE) 500 MG tablet Take 2 tablets (1,000 mg) by mouth daily (with breakfast) (500MG X 2 = 1,000MG)      methocarbamol (ROBAXIN) 500  MG tablet Take 1 tablet (500 mg) by mouth 4 times daily as needed for muscle spasms      Milk Thistle 175 MG TABS Take 1 tablet by mouth every evening (Patient not taking: Reported on 6/21/2024)      multivitamin (CENTRUM SILVER) tablet Take 1 tablet by mouth every evening      propranolol (INDERAL) 60 MG tablet Take 1 tablet (60 mg) by mouth 2 times daily      rOPINIRole (REQUIP) 0.25 MG tablet Take 1 tablet (0.25 mg) by mouth 2 times daily      rOPINIRole (REQUIP) 0.25 MG tablet Take 1 tablet (0.25 mg) by mouth as needed (at noon PRN for restless legs) At noon as needed for restless leg      senna-docusate (SENOKOT-S/PERICOLACE) 8.6-50 MG tablet Take 2 tablets by mouth 2 times daily as needed for constipation      vitamin B-12 (CYANOCOBALAMIN) 500 MCG tablet Take 1 tablet (500 mcg) by mouth every evening      vitamin B6 (PYRIDOXINE) 100 MG tablet Take 1 tablet (100 mg) by mouth every evening      Vitamin D3 (CHOLECALCIFEROL) 25 mcg (1000 units) tablet Take 2 tablets (50 mcg) by mouth every morning (In addition to 1000 units in evening)      Vitamin D3 (CHOLECALCIFEROL) 25 mcg (1000 units) tablet Take 1 tablet (25 mcg) by mouth every evening (In addition to 2000 units in morning)        DISCHARGE PLAN:  Follow up labs: No labs orders/due  Medical Follow Up:      Follow up with primary care provider in 1-2 weeks  Discharge Services: Home Care:  Occupational Therapy, Physical Therapy, Registered Nurse, and Home Health Aide  Discharge Instructions Verbalized to Patient at Discharge:   Notify your provider if you have a fever greater than 100.5 degrees.     Discharge patient to home with current medications and treatments  Discharge Home with Home care as above  - Nursing call in 30 days supply of needed meds to pharmacy of choice upon discharge  - please send home with original of these discharge instructions and copy for chart.       ______________________________  Electronically signed:  KAR Moss CNP    [facility]                   6/27/2024

## 2024-06-27 NOTE — TELEPHONE ENCOUNTER
Central Prior Authorization Team   Phone: [phone]    PA Initiation    Medication: Methocarbamol 500MG Tablets  Insurance Company: [employer] Part D - Phone [phone] Fax [phone]  Pharmacy Filling the Rx: [facility] - [place], [place] - [address] [place] [address]  Filling Pharmacy Phone:    Filling Pharmacy Fax:    Start Date: 6/27/2024

## 2024-06-28 NOTE — TELEPHONE ENCOUNTER
Prior Authorization Approval    Authorization Effective Date: 1/1/2024  Authorization Expiration Date: 6/27/2025  Medication: Methocarbamol 500MG Tablets  Reference #:     Insurance Company: [employer] Part D - Phone [phone] Fax [phone]  Which Pharmacy is filling the prescription (Not needed for infusion/clinic administered): [facility] - [place], [place] - [address] [place] [address]  Pharmacy Notified: Yes  Patient Notified: Instructed pharmacy to notify patient when script is ready to /ship.

## 2024-10-20 ENCOUNTER — HEALTH MAINTENANCE LETTER (OUTPATIENT)
Age: 74
End: 2024-10-20

## 2025-01-26 ENCOUNTER — HEALTH MAINTENANCE LETTER (OUTPATIENT)
Age: 75
End: 2025-01-26

## 2025-05-03 ENCOUNTER — HEALTH MAINTENANCE LETTER (OUTPATIENT)
Age: 75
End: 2025-05-03

## 2025-08-16 ENCOUNTER — HEALTH MAINTENANCE LETTER (OUTPATIENT)
Age: 75
End: 2025-08-16

## (undated) DEVICE — DRAPE SHEET REV FOLD 3/4 9349

## (undated) DEVICE — PACK SPINE SM CUSTOM SNE15SSFSK

## (undated) DEVICE — SU VICRYL 2-0 CT-2 27" UND J269H

## (undated) DEVICE — SU ETHILON 2-0 FS 18" 664H

## (undated) DEVICE — RX VISTASEAL FIBRIN SEALANT W/THROMBIN 4ML VST04

## (undated) DEVICE — GLOVE PROTEXIS BLUE W/NEU-THERA 7.5  2D73EB75

## (undated) DEVICE — DRSG STERI STRIP 1X5" R1548

## (undated) DEVICE — DRAPE MICROSCOPE LEICA 54X150" AR8033650

## (undated) DEVICE — DRAPE IOBAN INCISE 23X17" 6650EZ

## (undated) DEVICE — ESU GROUND PAD UNIVERSAL W/O CORD

## (undated) DEVICE — SOL ADH LIQUID BENZOIN SWAB 0.6ML C1544

## (undated) DEVICE — GLOVE PROTEXIS W/NEU-THERA 7.0  2D73TE70

## (undated) DEVICE — ESU PENCIL W/HOLSTER E2350H

## (undated) DEVICE — ESU ELEC BLADE 6" COATED/INSULATED E1455-6

## (undated) DEVICE — NDL SPINAL 18GA 3.5" 405184

## (undated) DEVICE — DRSG KERLIX FLUFFS X5

## (undated) DEVICE — DRAPE COVER C-ARM SEAMLESS SNAP-KAP 03-KP26 LATEX FREE

## (undated) DEVICE — RX SURGIFLO HEMOSTATIC MATRIX W/THROMBIN 8ML 2994

## (undated) DEVICE — SPONGE SURGIFOAM 12 1972

## (undated) DEVICE — LINEN TOWEL PACK X5 5464

## (undated) DEVICE — DRAPE MAYO STAND 23X54 8337

## (undated) DEVICE — ESU ELEC BLADE 2.75" COATED/INSULATED E1455

## (undated) DEVICE — ESU CORD BIPOLAR 12' E0512

## (undated) DEVICE — TUBING SUCTION MEDI-VAC SOFT 3/16"X20' N520A

## (undated) DEVICE — PREP CHLORAPREP 26ML TINTED HI-LITE ORANGE 930815

## (undated) DEVICE — DRSG GAUZE 4X4" 8044

## (undated) DEVICE — SU VICRYL 0 UR-6 27" J603H

## (undated) DEVICE — TOOL DISSECT MIDAS MR8 12CM TELESC MATCH DMD MR8-T12MH30D

## (undated) DEVICE — POSITIONER PT PRONESAFE HEAD REST W/DERMAPROX INSERT 40599

## (undated) RX ORDER — DEXMEDETOMIDINE HYDROCHLORIDE 4 UG/ML
INJECTION, SOLUTION INTRAVENOUS
Status: DISPENSED
Start: 2022-10-05

## (undated) RX ORDER — POTASSIUM CHLORIDE 1500 MG/1
TABLET, EXTENDED RELEASE ORAL
Status: DISPENSED
Start: 2022-10-05

## (undated) RX ORDER — BETAMETHASONE SODIUM PHOSPHATE AND BETAMETHASONE ACETATE 3; 3 MG/ML; MG/ML
INJECTION, SUSPENSION INTRA-ARTICULAR; INTRALESIONAL; INTRAMUSCULAR; SOFT TISSUE
Status: DISPENSED
Start: 2024-06-18

## (undated) RX ORDER — HYDROMORPHONE HYDROCHLORIDE 1 MG/ML
INJECTION, SOLUTION INTRAMUSCULAR; INTRAVENOUS; SUBCUTANEOUS
Status: DISPENSED
Start: 2022-10-05

## (undated) RX ORDER — FENTANYL CITRATE 0.05 MG/ML
INJECTION, SOLUTION INTRAMUSCULAR; INTRAVENOUS
Status: DISPENSED
Start: 2022-10-05

## (undated) RX ORDER — PROPOFOL 10 MG/ML
INJECTION, EMULSION INTRAVENOUS
Status: DISPENSED
Start: 2022-10-05

## (undated) RX ORDER — CLINDAMYCIN PHOSPHATE 900 MG/50ML
INJECTION, SOLUTION INTRAVENOUS
Status: DISPENSED
Start: 2022-10-05

## (undated) RX ORDER — CEFAZOLIN SODIUM/WATER 2 G/20 ML
SYRINGE (ML) INTRAVENOUS
Status: DISPENSED
Start: 2022-10-05

## (undated) RX ORDER — LIDOCAINE HYDROCHLORIDE 10 MG/ML
INJECTION, SOLUTION EPIDURAL; INFILTRATION; INTRACAUDAL; PERINEURAL
Status: DISPENSED
Start: 2024-06-18

## (undated) RX ORDER — FENTANYL CITRATE 50 UG/ML
INJECTION, SOLUTION INTRAMUSCULAR; INTRAVENOUS
Status: DISPENSED
Start: 2022-10-05

## (undated) RX ORDER — ACETAMINOPHEN 325 MG/1
TABLET ORAL
Status: DISPENSED
Start: 2022-10-05